# Patient Record
Sex: MALE | Race: WHITE | NOT HISPANIC OR LATINO | Employment: OTHER | URBAN - METROPOLITAN AREA
[De-identification: names, ages, dates, MRNs, and addresses within clinical notes are randomized per-mention and may not be internally consistent; named-entity substitution may affect disease eponyms.]

---

## 2020-04-20 RX ORDER — ROPINIROLE 1 MG/1
0.5 TABLET, FILM COATED ORAL 3 TIMES DAILY
COMMUNITY
End: 2020-08-25 | Stop reason: SDUPTHER

## 2020-04-20 RX ORDER — LISINOPRIL 10 MG/1
10 TABLET ORAL DAILY
COMMUNITY
End: 2020-08-25 | Stop reason: SDUPTHER

## 2020-04-20 RX ORDER — METOPROLOL TARTRATE 50 MG/1
50 TABLET, FILM COATED ORAL EVERY 12 HOURS SCHEDULED
COMMUNITY
End: 2021-02-11 | Stop reason: SDUPTHER

## 2020-04-20 RX ORDER — OMEPRAZOLE 20 MG/1
20 CAPSULE, DELAYED RELEASE ORAL DAILY
COMMUNITY
End: 2020-04-21 | Stop reason: CLARIF

## 2020-04-20 RX ORDER — ASPIRIN 81 MG/1
81 TABLET ORAL DAILY
COMMUNITY

## 2020-04-20 RX ORDER — TAMSULOSIN HYDROCHLORIDE 0.4 MG/1
0.4 CAPSULE ORAL
COMMUNITY
End: 2020-04-21 | Stop reason: SDUPTHER

## 2020-04-20 RX ORDER — SIMVASTATIN 20 MG
20 TABLET ORAL
COMMUNITY
End: 2020-06-23 | Stop reason: SDUPTHER

## 2020-04-21 ENCOUNTER — OFFICE VISIT (OUTPATIENT)
Dept: INTERNAL MEDICINE CLINIC | Facility: CLINIC | Age: 60
End: 2020-04-21
Payer: COMMERCIAL

## 2020-04-21 VITALS
HEIGHT: 69 IN | DIASTOLIC BLOOD PRESSURE: 80 MMHG | OXYGEN SATURATION: 97 % | BODY MASS INDEX: 40.58 KG/M2 | SYSTOLIC BLOOD PRESSURE: 120 MMHG | WEIGHT: 274 LBS | HEART RATE: 61 BPM

## 2020-04-21 DIAGNOSIS — I10 ESSENTIAL HYPERTENSION: ICD-10-CM

## 2020-04-21 DIAGNOSIS — K21.00 REFLUX ESOPHAGITIS: Primary | ICD-10-CM

## 2020-04-21 DIAGNOSIS — N18.2 CKD (CHRONIC KIDNEY DISEASE) STAGE 2, GFR 60-89 ML/MIN: ICD-10-CM

## 2020-04-21 DIAGNOSIS — F32.0 CURRENT MILD EPISODE OF MAJOR DEPRESSIVE DISORDER WITHOUT PRIOR EPISODE (HCC): ICD-10-CM

## 2020-04-21 DIAGNOSIS — N40.0 BPH WITHOUT OBSTRUCTION/LOWER URINARY TRACT SYMPTOMS: ICD-10-CM

## 2020-04-21 PROCEDURE — 3074F SYST BP LT 130 MM HG: CPT | Performed by: INTERNAL MEDICINE

## 2020-04-21 PROCEDURE — 3008F BODY MASS INDEX DOCD: CPT | Performed by: INTERNAL MEDICINE

## 2020-04-21 PROCEDURE — 1036F TOBACCO NON-USER: CPT | Performed by: INTERNAL MEDICINE

## 2020-04-21 PROCEDURE — 3079F DIAST BP 80-89 MM HG: CPT | Performed by: INTERNAL MEDICINE

## 2020-04-21 PROCEDURE — 99214 OFFICE O/P EST MOD 30 MIN: CPT | Performed by: INTERNAL MEDICINE

## 2020-04-21 RX ORDER — ESCITALOPRAM OXALATE 10 MG/1
10 TABLET ORAL DAILY
Qty: 90 TABLET | Refills: 0 | Status: SHIPPED | OUTPATIENT
Start: 2020-04-21 | End: 2020-07-14

## 2020-04-21 RX ORDER — TAMSULOSIN HYDROCHLORIDE 0.4 MG/1
0.4 CAPSULE ORAL
Qty: 90 CAPSULE | Refills: 1 | Status: SHIPPED | OUTPATIENT
Start: 2020-04-21 | End: 2021-02-10 | Stop reason: SDUPTHER

## 2020-04-21 RX ORDER — PANTOPRAZOLE SODIUM 40 MG/1
40 TABLET, DELAYED RELEASE ORAL DAILY
Qty: 90 TABLET | Refills: 0 | Status: SHIPPED | OUTPATIENT
Start: 2020-04-21 | End: 2020-08-10

## 2020-06-23 DIAGNOSIS — E78.2 MIXED HYPERLIPIDEMIA: Primary | ICD-10-CM

## 2020-06-23 RX ORDER — SIMVASTATIN 20 MG
20 TABLET ORAL
Qty: 90 TABLET | Refills: 0 | Status: SHIPPED | OUTPATIENT
Start: 2020-06-23 | End: 2020-08-10

## 2020-07-12 DIAGNOSIS — F32.0 CURRENT MILD EPISODE OF MAJOR DEPRESSIVE DISORDER WITHOUT PRIOR EPISODE (HCC): ICD-10-CM

## 2020-07-14 RX ORDER — ESCITALOPRAM OXALATE 10 MG/1
TABLET ORAL
Qty: 90 TABLET | Refills: 0 | Status: SHIPPED | OUTPATIENT
Start: 2020-07-14 | End: 2021-03-12

## 2020-08-08 DIAGNOSIS — E78.2 MIXED HYPERLIPIDEMIA: ICD-10-CM

## 2020-08-08 DIAGNOSIS — K21.00 REFLUX ESOPHAGITIS: ICD-10-CM

## 2020-08-10 RX ORDER — SIMVASTATIN 20 MG
TABLET ORAL
Qty: 90 TABLET | Refills: 0 | Status: SHIPPED | OUTPATIENT
Start: 2020-08-10 | End: 2020-08-25 | Stop reason: SDUPTHER

## 2020-08-10 RX ORDER — PANTOPRAZOLE SODIUM 40 MG/1
TABLET, DELAYED RELEASE ORAL
Qty: 90 TABLET | Refills: 0 | Status: SHIPPED | OUTPATIENT
Start: 2020-08-10 | End: 2020-08-25 | Stop reason: SDUPTHER

## 2020-08-24 ENCOUNTER — TELEPHONE (OUTPATIENT)
Dept: OTHER | Facility: OTHER | Age: 60
End: 2020-08-24

## 2020-08-25 ENCOUNTER — OFFICE VISIT (OUTPATIENT)
Dept: FAMILY MEDICINE CLINIC | Facility: CLINIC | Age: 60
End: 2020-08-25
Payer: COMMERCIAL

## 2020-08-25 VITALS
SYSTOLIC BLOOD PRESSURE: 122 MMHG | BODY MASS INDEX: 36.73 KG/M2 | HEIGHT: 69 IN | WEIGHT: 248 LBS | HEART RATE: 80 BPM | TEMPERATURE: 98.9 F | OXYGEN SATURATION: 98 % | RESPIRATION RATE: 16 BRPM | DIASTOLIC BLOOD PRESSURE: 78 MMHG

## 2020-08-25 DIAGNOSIS — G25.81 RLS (RESTLESS LEGS SYNDROME): ICD-10-CM

## 2020-08-25 DIAGNOSIS — H60.501 ACUTE OTITIS EXTERNA OF RIGHT EAR, UNSPECIFIED TYPE: ICD-10-CM

## 2020-08-25 DIAGNOSIS — I10 ESSENTIAL HYPERTENSION: ICD-10-CM

## 2020-08-25 DIAGNOSIS — K21.9 GASTROESOPHAGEAL REFLUX DISEASE WITHOUT ESOPHAGITIS: ICD-10-CM

## 2020-08-25 DIAGNOSIS — E78.2 MIXED HYPERLIPIDEMIA: Primary | ICD-10-CM

## 2020-08-25 PROCEDURE — 99214 OFFICE O/P EST MOD 30 MIN: CPT | Performed by: INTERNAL MEDICINE

## 2020-08-25 PROCEDURE — 3078F DIAST BP <80 MM HG: CPT | Performed by: INTERNAL MEDICINE

## 2020-08-25 PROCEDURE — 3074F SYST BP LT 130 MM HG: CPT | Performed by: INTERNAL MEDICINE

## 2020-08-25 PROCEDURE — 3008F BODY MASS INDEX DOCD: CPT | Performed by: INTERNAL MEDICINE

## 2020-08-25 PROCEDURE — 1036F TOBACCO NON-USER: CPT | Performed by: INTERNAL MEDICINE

## 2020-08-25 RX ORDER — ROPINIROLE 1 MG/1
1 TABLET, FILM COATED ORAL
Qty: 90 TABLET | Refills: 1 | Status: SHIPPED | OUTPATIENT
Start: 2020-08-25 | End: 2021-02-11 | Stop reason: SDUPTHER

## 2020-08-25 RX ORDER — OMEPRAZOLE 20 MG/1
20 CAPSULE, DELAYED RELEASE ORAL
Qty: 30 CAPSULE | Refills: 5 | Status: SHIPPED | OUTPATIENT
Start: 2020-08-25 | End: 2020-11-17 | Stop reason: SDUPTHER

## 2020-08-25 RX ORDER — NEOMYCIN SULFATE, POLYMYXIN B SULFATE AND HYDROCORTISONE 10; 3.5; 1 MG/ML; MG/ML; [USP'U]/ML
4 SUSPENSION/ DROPS AURICULAR (OTIC) 4 TIMES DAILY
Qty: 5 ML | Refills: 0 | Status: SHIPPED | OUTPATIENT
Start: 2020-08-25 | End: 2021-06-01

## 2020-08-25 RX ORDER — LISINOPRIL 20 MG/1
20 TABLET ORAL DAILY
Qty: 90 TABLET | Refills: 1 | Status: SHIPPED | OUTPATIENT
Start: 2020-08-25 | End: 2021-02-11 | Stop reason: SDUPTHER

## 2020-08-25 RX ORDER — SIMVASTATIN 40 MG
40 TABLET ORAL
Qty: 90 TABLET | Refills: 3 | Status: SHIPPED | OUTPATIENT
Start: 2020-08-25 | End: 2021-08-09

## 2020-08-25 NOTE — TELEPHONE ENCOUNTER
Pt wife called in to confirm appointment  For Tuesday at 10:00 am with [facility] requesting to leave a note

## 2020-08-25 NOTE — PROGRESS NOTES
Assessment/Plan:   1  Most likely has external otitis right ear  He was prescribed Cortisporin otic suspension  To be use for the next 5 days and see how he does  If he is not better of an ENT consult  2  Hypertension under control  3  Hyperlipidemia continue statin  Changed to 40 mg daily was taking alternating 20 and 30  Mg  4  Paroxysmal atrial fibrillation  He appears to be in sinus rhythm has had no recent bouts of atrial fibrillation and he is off anticoagulation  He was told should he have any recurrence to let us know as he has to go on anticoagulations right away discussed stroke    Problem List Items Addressed This Visit        Cardiovascular and Mediastinum    Essential hypertension    Relevant Medications    lisinopril (ZESTRIL) 20 mg tablet      Other Visit Diagnoses     Mixed hyperlipidemia    -  Primary    Relevant Medications    simvastatin (ZOCOR) 40 mg tablet    RLS (restless legs syndrome)        Relevant Medications    omeprazole (PriLOSEC) 20 mg delayed release capsule    rOPINIRole (REQUIP) 1 mg tablet    Gastroesophageal reflux disease without esophagitis        Relevant Medications    omeprazole (PriLOSEC) 20 mg delayed release capsule    Acute otitis externa of right ear, unspecified type        Relevant Medications    neomycin-polymyxin-hydrocortisone (CORTISPORIN) 0 35%-10,000 units/mL-1% otic suspension            Subjective:      Patient ID: Lorry Aschoff is a 63 y o  male  Ellsworth Dry is here for follow-up  He has history of hypertension and hyperlipidemia  He also had bout of proximal atrial fibrillation was on short-term anticoagulation  He said he had some kind of catheterization shock and ablation done  He was shocked several times  He was on anticoagulation but after ablation anticoagulation was discontinued  He has not had any problem with atrial fibrillation since      He has no headaches or dizziness, no palpitations, no chest pains or shortness of breath, no GI or  issues, no joint pains bone pains muscle aches, weight is stable appetite is good  He complains of right earache, clogged feeding, decrease hearing  This is present for several days  He tried to clean the wax with a Q-tip but it was not successful      The following portions of the patient's history were reviewed and updated as appropriate:   He has a past medical history of A-fib ([address] ), Arthritis, Cardiac arrhythmia, GERD (gastroesophageal reflux disease), Hyperlipidemia, Hypertension, Metabolic syndrome, Nocturia, Renal disorder, Restless legs, Seasonal allergies, Sleep apnea, and Vertigo  ,  does not have any pertinent problems on file  ,   has a past surgical history that includes Palate / uvula biopsy / excision; Shoulder surgery (Bilateral); Colonoscopy (01/01/2012); and Shoulder surgery (Bilateral)  ,  family history includes Cancer in his brother and mother; Diabetes in his sister; Heart disease in his brother and father  ,   reports that he has never smoked  He has never used smokeless tobacco  He reports current alcohol use  No history on file for drug ,  has No Known Allergies     Current Outpatient Medications   Medication Sig Dispense Refill    aspirin (ECOTRIN LOW STRENGTH) 81 mg EC tablet Take 81 mg by mouth daily      escitalopram (LEXAPRO) 10 mg tablet TAKE 1 TABLET BY MOUTH EVERY DAY 90 tablet 0    lisinopril (ZESTRIL) 20 mg tablet Take 1 tablet (20 mg total) by mouth daily 90 tablet 1    metoprolol tartrate (LOPRESSOR) 50 mg tablet Take 50 mg by mouth every 12 (twelve) hours      neomycin-polymyxin-hydrocortisone (CORTISPORIN) 0 35%-10,000 units/mL-1% otic suspension Administer 4 drops to the right ear 4 (four) times a day 5 mL 0    omeprazole (PriLOSEC) 20 mg delayed release capsule Take 1 capsule (20 mg total) by mouth daily before breakfast 30 capsule 5    rOPINIRole (REQUIP) 1 mg tablet Take 1 tablet (1 mg total) by mouth daily at bedtime 90 tablet 1    simvastatin (ZOCOR) 40 mg tablet Take 1 tablet (40 mg total) by mouth daily at bedtime 90 tablet 3    tamsulosin (FLOMAX) 0 4 mg Take 1 capsule (0 4 mg total) by mouth daily with dinner 90 capsule 1     No current facility-administered medications for this visit  Review of Systems   HENT: Positive for hearing loss  All other systems reviewed and are negative  Objective:  Vitals:    08/25/20 1030   BP: 122/78   BP Location: Left arm   Patient Position: Sitting   Cuff Size: Adult   Pulse: 80   Resp: 16   Temp: 98 9 °F (37 2 °C)   TempSrc: Tympanic   SpO2: 98%   Weight: 112 kg (248 lb)   Height: 5' 9" (1 753 m)     Body mass index is 36 62 kg/m²  Physical Exam  Vitals signs and nursing note reviewed  Constitutional:       Appearance: Normal appearance  HENT:      Head: Normocephalic and atraumatic  Right Ear: There is impacted cerumen  Left Ear: Tympanic membrane, ear canal and external ear normal       Ears:      Comments: Her right ear external canal is slightly inflamed and red  Tympanic membrane could not be seen     Nose: Nose normal       Mouth/Throat:      Mouth: Mucous membranes are moist    Eyes:      General: No scleral icterus  Extraocular Movements: Extraocular movements intact  Conjunctiva/sclera: Conjunctivae normal       Pupils: Pupils are equal, round, and reactive to light  Neck:      Musculoskeletal: Normal range of motion and neck supple  Vascular: No carotid bruit  Cardiovascular:      Rate and Rhythm: Regular rhythm  Pulses: Normal pulses  Heart sounds: Normal heart sounds  No murmur  Abdominal:      General: Abdomen is flat  Palpations: Abdomen is soft  There is no mass  Hernia: No hernia is present  Musculoskeletal: Normal range of motion  Right lower leg: No edema  Left lower leg: No edema  Lymphadenopathy:      Cervical: No cervical adenopathy  Skin:     General: Skin is warm and dry     Neurological:      General: No focal deficit present  Mental Status: He is alert and oriented to person, place, and time  Psychiatric:         Mood and Affect: Mood normal          Behavior: Behavior normal          Thought Content:  Thought content normal          Judgment: Judgment normal

## 2020-11-17 DIAGNOSIS — G25.81 RLS (RESTLESS LEGS SYNDROME): ICD-10-CM

## 2020-11-17 RX ORDER — OMEPRAZOLE 20 MG/1
20 CAPSULE, DELAYED RELEASE ORAL
Qty: 30 CAPSULE | Refills: 5 | Status: SHIPPED | OUTPATIENT
Start: 2020-11-17 | End: 2020-11-23

## 2020-11-20 DIAGNOSIS — G25.81 RLS (RESTLESS LEGS SYNDROME): ICD-10-CM

## 2020-11-23 RX ORDER — OMEPRAZOLE 20 MG/1
20 CAPSULE, DELAYED RELEASE ORAL
Qty: 30 CAPSULE | Refills: 5 | Status: SHIPPED | OUTPATIENT
Start: 2020-11-23

## 2021-02-10 DIAGNOSIS — N40.0 BPH WITHOUT OBSTRUCTION/LOWER URINARY TRACT SYMPTOMS: ICD-10-CM

## 2021-02-10 RX ORDER — TAMSULOSIN HYDROCHLORIDE 0.4 MG/1
0.4 CAPSULE ORAL
Qty: 90 CAPSULE | Refills: 1 | Status: SHIPPED | OUTPATIENT
Start: 2021-02-10 | End: 2021-08-02

## 2021-02-11 ENCOUNTER — OFFICE VISIT (OUTPATIENT)
Dept: FAMILY MEDICINE CLINIC | Facility: CLINIC | Age: 61
End: 2021-02-11
Payer: COMMERCIAL

## 2021-02-11 VITALS
WEIGHT: 284 LBS | BODY MASS INDEX: 42.06 KG/M2 | OXYGEN SATURATION: 98 % | TEMPERATURE: 97.6 F | HEIGHT: 69 IN | RESPIRATION RATE: 16 BRPM | DIASTOLIC BLOOD PRESSURE: 70 MMHG | SYSTOLIC BLOOD PRESSURE: 130 MMHG | HEART RATE: 78 BPM

## 2021-02-11 DIAGNOSIS — Z12.5 SCREENING FOR PROSTATE CANCER: ICD-10-CM

## 2021-02-11 DIAGNOSIS — I10 ESSENTIAL HYPERTENSION: ICD-10-CM

## 2021-02-11 DIAGNOSIS — N18.31 STAGE 3A CHRONIC KIDNEY DISEASE (HCC): ICD-10-CM

## 2021-02-11 DIAGNOSIS — M25.561 RIGHT KNEE PAIN, UNSPECIFIED CHRONICITY: ICD-10-CM

## 2021-02-11 DIAGNOSIS — N18.2 CKD (CHRONIC KIDNEY DISEASE) STAGE 2, GFR 60-89 ML/MIN: ICD-10-CM

## 2021-02-11 DIAGNOSIS — Z00.00 ENCOUNTER FOR PREVENTATIVE ADULT HEALTH CARE EXAMINATION: Primary | ICD-10-CM

## 2021-02-11 DIAGNOSIS — H53.9 VISION CHANGES: ICD-10-CM

## 2021-02-11 DIAGNOSIS — Z00.00 ANNUAL PHYSICAL EXAM: ICD-10-CM

## 2021-02-11 DIAGNOSIS — Z12.11 SCREEN FOR COLON CANCER: ICD-10-CM

## 2021-02-11 DIAGNOSIS — G25.81 RLS (RESTLESS LEGS SYNDROME): ICD-10-CM

## 2021-02-11 DIAGNOSIS — E66.01 MORBID OBESITY WITH BMI OF 40.0-44.9, ADULT (HCC): ICD-10-CM

## 2021-02-11 DIAGNOSIS — F32.0 CURRENT MILD EPISODE OF MAJOR DEPRESSIVE DISORDER WITHOUT PRIOR EPISODE (HCC): ICD-10-CM

## 2021-02-11 PROCEDURE — 3725F SCREEN DEPRESSION PERFORMED: CPT | Performed by: INTERNAL MEDICINE

## 2021-02-11 PROCEDURE — 99396 PREV VISIT EST AGE 40-64: CPT | Performed by: INTERNAL MEDICINE

## 2021-02-11 RX ORDER — METOPROLOL TARTRATE 50 MG/1
50 TABLET, FILM COATED ORAL EVERY 12 HOURS SCHEDULED
Qty: 180 TABLET | Refills: 1 | Status: SHIPPED | OUTPATIENT
Start: 2021-02-11 | End: 2021-08-06

## 2021-02-11 RX ORDER — ROPINIROLE 1 MG/1
1 TABLET, FILM COATED ORAL
Qty: 90 TABLET | Refills: 1 | Status: SHIPPED | OUTPATIENT
Start: 2021-02-11 | End: 2021-08-26 | Stop reason: SDUPTHER

## 2021-02-11 RX ORDER — LISINOPRIL 20 MG/1
20 TABLET ORAL DAILY
Qty: 90 TABLET | Refills: 1 | Status: SHIPPED | OUTPATIENT
Start: 2021-02-11 | End: 2021-08-06

## 2021-02-11 NOTE — PROGRESS NOTES
ADULT ANNUAL [address], [address] [facility]IMARY CARE [place]    NAME: Tobi Hoffmann  AGE: 63 y o  SEX: male  : 1960     DATE: 2021     Assessment and Plan:     Problem List Items Addressed This Visit        Cardiovascular and Mediastinum    Essential hypertension     Controlled with lisinopril  Check labs  Refer to ophthalmology         Relevant Medications    metoprolol tartrate (LOPRESSOR) 50 mg tablet    lisinopril (ZESTRIL) 20 mg tablet    Other Relevant Orders    CBC and differential    Comprehensive metabolic panel    Lipid panel    TSH, 3rd generation    Urinalysis with microscopic    Ambulatory referral to Ophthalmology       Other    Current mild episode of major depressive disorder without prior episode ([facility] )     stable         Encounter for preventative adult health care examination - Primary    Screening for prostate cancer    Relevant Orders    PSA, Total Screen    Morbid obesity with BMI of 40 0-44 9, adult ([facility] )      Other Visit Diagnoses     Annual physical exam        Vision changes        Relevant Orders    Ambulatory referral to Ophthalmology    Screen for colon cancer        Relevant Orders    Ambulatory referral for colonoscopy    RLS (restless legs syndrome)        Relevant Medications    rOPINIRole (REQUIP) 1 mg tablet          Immunizations and preventive care screenings were discussed with patient today  Appropriate education was printed on patient's after visit summary  Counseling:  · Alcohol/drug use: discussed moderation in alcohol intake, the recommendations for healthy alcohol use, and avoidance of illicit drug use  BMI Counseling: Body mass index is 41 94 kg/m²   The BMI is above normal  Nutrition recommendations include decreasing portion sizes, encouraging healthy choices of fruits and vegetables, decreasing fast food intake, moderation in carbohydrate intake, reducing intake of saturated and trans fat and reducing intake of cholesterol  Exercise recommendations include vigorous physical activity 75 minutes/week, exercising 3-5 times per week and strength training exercises  No pharmacotherapy was ordered  Return in about 3 months (around 2021)  Chief Complaint:     Chief Complaint   Patient presents with    Annual Exam      History of Present Illness:     Adult Annual Physical   Patient here for a comprehensive physical exam  The patient reports problems - schmitz  Diet and Physical Activity  · Diet/Nutrition: well balanced diet  · Exercise: no formal exercise  Depression Screening  PHQ-9 Depression Screening    PHQ-9:   Frequency of the following problems over the past two weeks:      Little interest or pleasure in doing things: 1 - several days  Feeling down, depressed, or hopeless: 1 - several days  Trouble falling or staying asleep, or sleeping too much: 2 - more than half the days  Feeling tired or having little energy: 2 - more than half the days  Poor appetite or overeatin - several days  Feeling bad about yourself - or that you are a failure or have let yourself or your family down: 0 - not at all  Trouble concentrating on things, such as reading the newspaper or watching television: 1 - several days  Moving or speaking so slowly that other people could have noticed  Or the opposite - being so fidgety or restless that you have been moving around a lot more than usual: 0 - not at all  Thoughts that you would be better off dead, or of hurting yourself in some way: 0 - not at all  PHQ-2 Score: 2  PHQ-9 Score: 8       General Health  · Sleep: sleeps well  · Hearing: normal - bilateral   · Vision: vision problems: lazy eye since kid  · Dental: no dental visits for >1 year   Health  · Symptoms include: none     Review of Systems:     Review of Systems   Constitutional: Negative for chills, fatigue and fever  HENT: Negative for congestion, nosebleeds and rhinorrhea      Eyes: Negative for discharge and visual disturbance  Respiratory: Negative for cough, chest tightness, shortness of breath and wheezing  Cardiovascular: Negative for chest pain  Gastrointestinal: Negative for abdominal distention, abdominal pain, constipation, diarrhea and vomiting  Endocrine: Negative for polydipsia and polyuria  Genitourinary: Negative for difficulty urinating, frequency and hematuria  Musculoskeletal: Positive for arthralgias (right knee)  Negative for back pain and myalgias  Skin: Negative for rash  Allergic/Immunologic: Negative for environmental allergies  Neurological: Negative for dizziness, syncope, weakness, light-headedness and headaches  Hematological: Negative  Psychiatric/Behavioral: Negative for agitation, confusion, decreased concentration, dysphoric mood, sleep disturbance and suicidal ideas  The patient is not nervous/anxious  All other systems reviewed and are negative  Past Medical History:     Past Medical History:   Diagnosis Date    [facility]     Arthritis     Cardiac arrhythmia     afib s/p ablasion     GERD (gastroesophageal reflux disease)     Hyperlipidemia     ldl 123 from 179 from 138, -  zocor     Hypertension     Metabolic syndrome     history of metabolic syndrome with insulin resistance  Diet and lifestyle modification, weight reduction, daily exercise  Patient education regarding metabolic syndrome, insulin resistant, dyslipidemia   Nocturia     Renal disorder     chronic kidney disease stage 2    Restless legs     Seasonal allergies     Sleep apnea     CPAP    Vertigo       Past Surgical History:     Past Surgical History:   Procedure Laterality Date    COLONOSCOPY  01/01/2012    no polyps    PALATE / UVULA BIOPSY / EXCISION      SHOULDER SURGERY Bilateral     SHOULDER SURGERY Bilateral     s/p 4 surgery - 2 on each side        Family History:     Family History   Problem Relation Age of Onset    Cancer Mother    Angelica Kaye Heart disease Father     Diabetes Sister     Heart disease Brother     Cancer Brother       Social History:        Social History     Socioeconomic History    Marital status: /Civil Union     Spouse name: None    Number of children: None    Years of education: None    Highest education level: None   Occupational History    None   Social Needs    Financial resource strain: Not very hard    Food insecurity     Worry: Never true     Inability: Never true    Transportation needs     Medical: No     Non-medical: No   Tobacco Use    Smoking status: Never Smoker    Smokeless tobacco: Never Used   Substance and Sexual Activity    Alcohol use: Yes     Comment: occasional    Drug use: Never    Sexual activity: None   Lifestyle    Physical activity     Days per week: 0 days     Minutes per session: 0 min    Stress: Not at all   Relationships    Social connections     Talks on phone: None     Gets together: None     Attends Muslim service: None     Active member of club or organization: None     Attends meetings of clubs or organizations: None     Relationship status: None    Intimate partner violence     Fear of current or ex partner: None     Emotionally abused: None     Physically abused: None     Forced sexual activity: None   Other Topics Concern    None   Social History Narrative    · Do you currently or have you served in [employer]:   No      · Alcohol intake:   Occasional      · General stress level:   Medium      · Occupation:    and truck drivers      · Caffeine intake:   Occasional       Current Medications:     Current Outpatient Medications   Medication Sig Dispense Refill    aspirin (ECOTRIN LOW STRENGTH) 81 mg EC tablet Take 81 mg by mouth daily      lisinopril (ZESTRIL) 20 mg tablet Take 1 tablet (20 mg total) by mouth daily 90 tablet 1    metoprolol tartrate (LOPRESSOR) 50 mg tablet Take 1 tablet (50 mg total) by mouth every 12 (twelve) hours 180 tablet 1    neomycin-polymyxin-hydrocortisone (CORTISPORIN) 0 35%-10,000 units/mL-1% otic suspension Administer 4 drops to the right ear 4 (four) times a day 5 mL 0    omeprazole (PriLOSEC) 20 mg delayed release capsule TAKE 1 CAPSULE (20 MG TOTAL) BY MOUTH DAILY BEFORE BREAKFAST 30 capsule 5    rOPINIRole (REQUIP) 1 mg tablet Take 1 tablet (1 mg total) by mouth daily at bedtime 90 tablet 1    simvastatin (ZOCOR) 40 mg tablet Take 1 tablet (40 mg total) by mouth daily at bedtime 90 tablet 3    tamsulosin (FLOMAX) 0 4 mg Take 1 capsule (0 4 mg total) by mouth daily with dinner 90 capsule 1    escitalopram (LEXAPRO) 10 mg tablet TAKE 1 TABLET BY MOUTH EVERY DAY (Patient not taking: Reported on 2/11/2021) 90 tablet 0     No current facility-administered medications for this visit  Allergies:     No Known Allergies   Physical Exam:     /70 (BP Location: Right arm, Patient Position: Sitting, Cuff Size: Large)   Pulse 78   Temp 97 6 °F (36 4 °C) (Temporal)   Resp 16   Ht 5' 9" (1 753 m)   Wt 129 kg (284 lb)   SpO2 98%   BMI 41 94 kg/m²     Physical Exam  Vitals signs and nursing note reviewed  Constitutional:       Appearance: He is well-developed  HENT:      Head: Normocephalic and atraumatic  Eyes:      Conjunctiva/sclera: Conjunctivae normal    Neck:      Musculoskeletal: Neck supple  Cardiovascular:      Rate and Rhythm: Normal rate and regular rhythm  Pulses: Normal pulses  Heart sounds: Normal heart sounds  No murmur  Pulmonary:      Effort: Pulmonary effort is normal  No respiratory distress  Breath sounds: Normal breath sounds  Abdominal:      Palpations: Abdomen is soft  Tenderness: There is no abdominal tenderness  Musculoskeletal:      Right lower leg: No edema  Left lower leg: No edema  Skin:     General: Skin is warm and dry  Neurological:      Mental Status: He is alert and oriented to person, place, and time     Psychiatric:         Mood and Affect: Mood normal          Behavior: Behavior normal           Eliza Rivera MD  [facility] [place]  BMI Counseling: Body mass index is 41 94 kg/m²  The BMI is above normal  Nutrition recommendations include reducing portion sizes

## 2021-02-11 NOTE — PATIENT INSTRUCTIONS
Wellness Visit for Adults   AMBULATORY CARE:   A wellness visit  is when you see your healthcare provider to get screened for health problems  Your healthcare provider will also give you advice on how to stay healthy  Write down your questions so you remember to ask them  Ask your healthcare provider how often you should have a wellness visit  What happens at a wellness visit:  Your healthcare provider will ask about your health, and your family history of health problems  This includes high blood pressure, heart disease, and cancer  He or she will ask if you have symptoms that concern you, if you smoke, and about your mood  You may also be asked about your intake of medicines, supplements, food, and alcohol  Any of the following may be done:  · Your weight  will be checked  Your height may also be checked so your body mass index (BMI) can be calculated  Your BMI shows if you are at a healthy weight  · Your blood pressure  and heart rate will be checked  Your temperature may also be checked  · Blood and urine tests  may be done  Blood tests may be done to check your cholesterol levels  Abnormal cholesterol levels increase your risk for heart disease and stroke  You may also need a blood or urine test to check for diabetes if you are at increased risk  Urine tests may be done to look for signs of an infection or kidney disease  · A physical exam  includes checking your heartbeat and lungs with a stethoscope  Your healthcare provider may also check your skin to look for sun damage  · Screening tests  may be recommended  A screening test is done to check for diseases that may not cause symptoms  The screening tests you may need depend on your age, gender, family history, and lifestyle habits  For example, colorectal screening may be recommended if you are 48years old or older  Screening tests you need if you are a woman:   · A Pap smear  is used to screen for cervical cancer   Pap smears are usually done every 3 to 5 years depending on your age  You may need them more often if you have had abnormal Pap smear test results in the past  Ask your healthcare provider how often you should have a Pap smear  · A mammogram  is an x-ray of your breasts to screen for breast cancer  Experts recommend mammograms every 2 years starting at age 48 years  You may need a mammogram at age 52 years or younger if you have an increased risk for breast cancer  Talk to your healthcare provider about when you should start having mammograms and how often you need them  Vaccines you may need:   · Get an influenza vaccine  every year  The influenza vaccine protects you from the flu  Several types of viruses cause the flu  The viruses change over time, so new vaccines are made each year  · Get a tetanus-diphtheria (Td) booster vaccine  every 10 years  This vaccine protects you against tetanus and diphtheria  Tetanus is a severe infection that may cause painful muscle spasms and lockjaw  Diphtheria is a severe bacterial infection that causes a thick covering in the back of your mouth and throat  · Get a human papillomavirus (HPV) vaccine  if you are female and aged 23 to 32 or male 23 to 24 and never received it  This vaccine protects you from HPV infection  HPV is the most common infection spread by sexual contact  HPV may also cause vaginal, penile, and anal cancers  · Get a pneumococcal vaccine  if you are aged 72 years or older  The pneumococcal vaccine is an injection given to protect you from pneumococcal disease  Pneumococcal disease is an infection caused by pneumococcal bacteria  The infection may cause pneumonia, meningitis, or an ear infection  · Get a shingles vaccine  if you are 60 or older, even if you have had shingles before  The shingles vaccine is an injection to protect you from the varicella-zoster virus  This is the same virus that causes chickenpox   Shingles is a painful rash that develops in people who had chickenpox or have been exposed to the virus  How to eat healthy:  My Plate is a model for planning healthy meals  It shows the types and amounts of foods that should go on your plate  Fruits and vegetables make up about half of your plate, and grains and protein make up the other half  A serving of dairy is included on the side of your plate  The amount of calories and serving sizes you need depends on your age, gender, weight, and height  Examples of healthy foods are listed below:  · Eat a variety of vegetables  such as dark green, red, and orange vegetables  You can also include canned vegetables low in sodium (salt) and frozen vegetables without added butter or sauces  · Eat a variety of fresh fruits , canned fruit in 100% juice, frozen fruit, and dried fruit  · Include whole grains  At least half of the grains you eat should be whole grains  Examples include whole-wheat bread, wheat pasta, brown rice, and whole-grain cereals such as oatmeal     · Eat a variety of protein foods such as seafood (fish and shellfish), lean meat, and poultry without skin (turkey and chicken)  Examples of lean meats include pork leg, shoulder, or tenderloin, and beef round, sirloin, tenderloin, and extra lean ground beef  Other protein foods include eggs and egg substitutes, beans, peas, soy products, nuts, and seeds  · Choose low-fat dairy products such as skim or 1% milk or low-fat yogurt, cheese, and cottage cheese  · Limit unhealthy fats  such as butter, hard margarine, and shortening  Exercise:  Exercise at least 30 minutes per day on most days of the week  Some examples of exercise include walking, biking, dancing, and swimming  You can also fit in more physical activity by taking the stairs instead of the elevator or parking farther away from stores  Include muscle strengthening activities 2 days each week  Regular exercise provides many health benefits   It helps you manage your weight, and decreases your risk for type 2 diabetes, heart disease, stroke, and high blood pressure  Exercise can also help improve your mood  Ask your healthcare provider about the best exercise plan for you  General health and safety guidelines:   · Do not smoke  Nicotine and other chemicals in cigarettes and cigars can cause lung damage  Ask your healthcare provider for information if you currently smoke and need help to quit  E-cigarettes or smokeless tobacco still contain nicotine  Talk to your healthcare provider before you use these products  · Limit alcohol  A drink of alcohol is 12 ounces of beer, 5 ounces of wine, or 1½ ounces of liquor  · Lose weight, if needed  Being overweight increases your risk of certain health conditions  These include heart disease, high blood pressure, type 2 diabetes, and certain types of cancer  · Protect your skin  Do not sunbathe or use tanning beds  Use sunscreen with a SPF 15 or higher  Apply sunscreen at least 15 minutes before you go outside  Reapply sunscreen every 2 hours  Wear protective clothing, hats, and sunglasses when you are outside  · Drive safely  Always wear your seatbelt  Make sure everyone in your car wears a seatbelt  A seatbelt can save your life if you are in an accident  Do not use your cell phone when you are driving  This could distract you and cause an accident  Pull over if you need to make a call or send a text message  · Practice safe sex  Use latex condoms if are sexually active and have more than one partner  Your healthcare provider may recommend screening tests for sexually transmitted infections (STIs)  · Wear helmets, lifejackets, and protective gear  Always wear a helmet when you ride a bike or motorcycle, go skiing, or play sports that could cause a head injury  Wear protective equipment when you play sports  Wear a lifejacket when you are on a boat or doing water sports      © Copyright [employer] 2020 Information is for End User's use only and may not be sold, redistributed or otherwise used for commercial purposes  All illustrations and images included in CareNotes® are the copyrighted property of A D A M [employer]  or Kaitlyn Araujo  The above information is an  only  It is not intended as medical advice for individual conditions or treatments  Talk to your doctor, nurse or pharmacist before following any medical regimen to see if it is safe and effective for you  Cholesterol and Your Health   AMBULATORY CARE:   Cholesterol  is a waxy, fat-like substance  Your body uses cholesterol to make hormones and new cells, and to protect nerves  Cholesterol is made by your body  It also comes from certain foods you eat, such as meat and dairy products  Your healthcare provider can help you set goals for your cholesterol levels  He or she can help you create a plan to meet your goals  Cholesterol level goals: Your cholesterol level goals depend on your risk for heart disease, your age, and your other health conditions  The following are general guidelines:  · Total cholesterol  includes low-density lipoprotein (LDL), high-density lipoprotein (HDL), and triglyceride levels  The total cholesterol level should be lower than 200 mg/dL and is best at about 150 mg/dL  · LDL cholesterol  is called bad cholesterol  because it forms plaque in your arteries  As plaque builds up, your arteries become narrow, and less blood flows through  When plaque decreases blood flow to your heart, you may have chest pain  If plaque completely blocks an artery that brings blood to your heart, you may have a heart attack  Plaque can break off and form blood clots  Blood clots may block arteries in your brain and cause a stroke  The level should be less than 130 mg/dL and is best at about 100 mg/dL  · HDL cholesterol  is called good cholesterol  because it helps remove LDL cholesterol from your arteries   It does this by attaching to LDL cholesterol and carrying it to your liver  Your liver breaks down LDL cholesterol so your body can get rid of it  High levels of HDL cholesterol can help prevent a heart attack and stroke  Low levels of HDL cholesterol can increase your risk for heart disease, heart attack, and stroke  The level should be 60 mg/dL or higher  · Triglycerides  are a type of fat that store energy from foods you eat  High levels of triglycerides also cause plaque buildup  This can increase your risk for a heart attack or stroke  If your triglyceride level is high, your LDL cholesterol level may also be high  The level should be less than 150 mg/dL  What increases your risk for high cholesterol:   · Smoking cigarettes    · Being overweight or obese, or not getting enough exercise    · Drinking large amounts of alcohol    · A medical condition such as hypertension (high blood pressure) or diabetes    · Certain genes passed from your parents to you    · Age older than 65 years    What you need to know about having your cholesterol levels checked: Adults 21to 39years of age should have their cholesterol levels checked every 4 to 6 years  Adults 45 years or older should have their cholesterol checked every 1 to 2 years  You may need your cholesterol checked more often, or at a younger age, if you have risk factors for heart disease  You may also need to have your cholesterol checked more often if you have other health conditions, such as diabetes  Blood tests are used to check cholesterol levels  Blood tests measure your levels of triglycerides, LDL cholesterol, and HDL cholesterol  How healthy fats affect your cholesterol levels:  Healthy fats, also called unsaturated fats, help lower LDL cholesterol and triglyceride levels  Healthy fats include the following:  · Monounsaturated fats  are found in foods such as olive oil, canola oil, avocado, nuts, and olives      · Polyunsaturated fats,  such as omega 3 fats, are found in fish, such as salmon, trout, and tuna  They can also be found in plant foods such as flaxseed, walnuts, and soybeans  How unhealthy fats affect your cholesterol levels:  Unhealthy fats increase LDL cholesterol and triglyceride levels  They are found in foods high in cholesterol, saturated fat, and trans fat:  · Cholesterol  is found in eggs, dairy, and meat  · Saturated fat  is found in butter, cheese, ice cream, whole milk, and coconut oil  Saturated fat is also found in meat, such as sausage, hot dogs, and bologna  · Trans fat  is found in liquid oils and is used in fried and baked foods  Foods that contain trans fats include chips, crackers, muffins, sweet rolls, microwave popcorn, and cookies  Treatment  for high cholesterol will also decrease your risk of heart disease, heart attack, and stroke  Treatment may include any of the following:  · Lifestyle changes  may include food, exercise, weight loss, and quitting smoking  You may also need to decrease the amount of alcohol you drink  Your healthcare provider will want you to start with lifestyle changes  Other treatment may be added if lifestyle changes are not enough  · Medicines  may be given to lower your LDL cholesterol, triglyceride levels, or total cholesterol level  You may need medicines to lower your cholesterol if any of the following is true:     ? You have a history of stroke, TIA, unstable angina, or a heart attack  ? Your LDL cholesterol level is 190 mg/dL or higher  ? You are age 36 to 76 years, have diabetes or heart disease risk factors, and your LDL cholesterol is 70 mg/dL or higher  · Supplements  include fish oil, red yeast rice, and garlic  Fish oil may help lower your triglyceride and LDL cholesterol levels  It may also increase your HDL cholesterol level  Red yeast rice may help decrease your total cholesterol level and LDL cholesterol level  Garlic may help lower your total cholesterol level   Do not take these supplements without talking to your healthcare provider  Food changes you can make to lower your cholesterol levels:  A dietitian can help you create a healthy eating plan  He or she can show you how to read food labels and choose foods low in saturated fat, trans fats, and cholesterol  · Decrease the total amount of fat you eat  Choose lean meats, fat-free or 1% fat milk, and low-fat dairy products, such as yogurt and cheese  Try to limit or avoid red meats  Limit or do not eat fried foods or baked goods, such as cookies  · Replace unhealthy fats with healthy fats  Cook foods in olive oil or canola oil  Choose soft margarines that are low in saturated fat and trans fat  Seeds, nuts, and avocados are other examples of healthy fats  · Eat foods with omega-3 fats  Examples include salmon, tuna, mackerel, walnuts, and flaxseed  Eat fish 2 times per week  Pregnant women should not eat fish that have high levels of mercury, such as shark, swordfish, and kumar mackerel  · Increase the amount of high-fiber foods you eat  High-fiber foods can help lower your LDL cholesterol  Aim to get between 20 and 30 grams of fiber each day  Fruits and vegetables are high in fiber  Eat at least 5 servings each day  Other high-fiber foods are whole-grain or whole-wheat breads, pastas, or cereals, and brown rice  Eat 3 ounces of whole-grain foods each day  Increase fiber slowly  You may have abdominal discomfort, bloating, and gas if you add fiber to your diet too quickly  · Eat healthy protein foods  Examples include low-fat dairy products, skinless chicken and turkey, fish, and nuts  · Limit foods and drinks that are high in sugar  Your dietitian or healthcare provider can help you create daily limits for high-sugar foods and drinks  The limit may be lower if you have diabetes or another health condition  Limits can also help you lose weight if needed    Lifestyle changes you can make to lower your cholesterol levels:   · Maintain a healthy weight  Ask your healthcare provider what a healthy weight is for you  Ask him or her to help you create a weight loss plan if needed  Weight loss can decrease your total cholesterol and triglyceride levels  Weight loss may also help keep your blood pressure at a healthy level  · Exercise regularly  Exercise can help lower your total cholesterol level and maintain a healthy weight  Exercise can also help increase your HDL cholesterol level  Work with your healthcare provider to create an exercise program that is right for you  Get at least 30 to 40 minutes of moderate exercise most days of the week  Examples of exercise include brisk walking, swimming, or biking  · Do not smoke  Nicotine and other chemicals in cigarettes and cigars can raise your cholesterol levels  Ask your healthcare provider for information if you currently smoke and need help to quit  E-cigarettes or smokeless tobacco still contain nicotine  Talk to your healthcare provider before you use these products  · Limit or do not drink alcohol  Alcohol can increase your triglyceride levels  Ask your healthcare provider before you drink alcohol  Ask how much is okay for you to drink in 1 day or 1 week  © Copyright [address] Information is for End User's use only and may not be sold, redistributed or otherwise used for commercial purposes  All illustrations and images included in CareNotes® are the copyrighted property of [employer] M , Inc  or [address] [place] [address]  The above information is an  only  It is not intended as medical advice for individual conditions or treatments  Talk to your doctor, nurse or pharmacist before following any medical regimen to see if it is safe and effective for you  Low Fat Diet   AMBULATORY CARE:   A low-fat diet  is an eating plan that is low in total fat, unhealthy fat, and cholesterol   You may need to follow a low-fat diet if you have trouble digesting or absorbing fat  You may also need to follow this diet if you have high cholesterol  You can also lower your cholesterol by increasing the amount of fiber in your diet  Soluble fiber is a type of fiber that helps to decrease cholesterol levels  Different types of fat in food:   · Limit unhealthy fats  A diet that is high in cholesterol, saturated fat, and trans fat may cause unhealthy cholesterol levels  Unhealthy cholesterol levels increase your risk of heart disease  ? Cholesterol:  Limit intake of cholesterol to less than 200 mg per day  Cholesterol is found in meat, eggs, and dairy  ? Saturated fat:  Limit saturated fat to less than 7% of your total daily calories  Ask your dietitian how many calories you need each day  Saturated fat is found in butter, cheese, ice cream, whole milk, and palm oil  Saturated fat is also found in meat, such as beef, pork, chicken skin, and processed meats  Processed meats include sausage, hot dogs, and bologna  ? Trans fat:  Avoid trans fat as much as possible  Trans fat is used in fried and baked foods  Foods that say trans fat free on the label may still have up to 0 5 grams of trans fat per serving  · Include healthy fats  Replace foods that are high in saturated and trans fat with foods high in healthy fats  This may help to decrease high cholesterol levels  ? Monounsaturated fats: These are found in avocados, nuts, and vegetable oils, such as olive, canola, and sunflower oil  ? Polyunsaturated fats: These can be found in vegetable oils, such as soybean or corn oil  Omega-3 fats can help to decrease the risk of heart disease  Omega-3 fats are found in fish, such as salmon, herring, trout, and tuna  Omega-3 fats can also be found in plant foods, such as walnuts, flaxseed, soybeans, and canola oil  Foods to limit or avoid:   · Grains:      ?  Snacks that are made with partially hydrogenated oils, such as chips, regular crackers, and butter-flavored popcorn    ? High-fat baked goods, such as biscuits, croissants, doughnuts, pies, cookies, and pastries    · Dairy:      ? Whole milk, 2% milk, and yogurt and ice cream made with whole milk    ? Half and half creamer, heavy cream, and whipping cream    ? Cheese, cream cheese, and sour cream    · Meats and proteins:      ? High-fat cuts of meat (T-bone steak, regular hamburger, and ribs)    ? Fried meat, poultry (turkey and chicken), and fish    ? Poultry (chicken and turkey) with skin    ? Cold cuts (salami or bologna), hot dogs, genao, and sausage    ? Whole eggs and egg yolks    · Vegetables and fruits with added fat:      ? Fried vegetables or vegetables in butter or high-fat sauces, such as cream or cheese sauces    ? Fried fruit or fruit served with butter or cream    · Fats:      ? Butter, stick margarine, and shortening    ? Coconut, palm oil, and palm kernel oil    Foods to include:   · Grains:      ? Whole-grain breads, cereals, pasta, and brown rice    ? Low-fat crackers and pretzels    · Vegetables and fruits:      ? Fresh, frozen, or canned vegetables (no salt or low-sodium)    ? Fresh, frozen, dried, or canned fruit (canned in light syrup or fruit juice)    ? Avocado    · Low-fat dairy products:      ? Nonfat (skim) or 1% milk    ? Nonfat or low-fat cheese, yogurt, and cottage cheese    · Meats and proteins:      ? Chicken or turkey with no skin    ? Baked or broiled fish    ? Lean beef and pork (loin, round, extra lean hamburger)    ? Beans and peas, unsalted nuts, soy products    ? Egg whites and substitutes    ? Seeds and nuts    · Fats:      ? Unsaturated oil, such as canola, olive, peanut, soybean, or sunflower oil    ? Soft or liquid margarine and vegetable oil spread    ? Low-fat salad dressing    Other ways to decrease fat:   · Read food labels before you buy foods  Choose foods that have less than 30% of calories from fat  Choose low-fat or fat-free dairy products   Remember that fat free does not mean calorie free  These foods still contain calories, and too many calories can lead to weight gain  · Trim fat from meat and avoid fried food  Trim all visible fat from meat before you cook it  Remove the skin from poultry  Do not escalona meat, fish, or poultry  Bake, roast, boil, or broil these foods instead  Avoid fried foods  Eat a baked potato instead of [place] fries  Steam vegetables instead of sautéing them in butter  · Add less fat to foods  Use imitation genao bits on salads and baked potatoes instead of regular genao bits  Use fat-free or low-fat salad dressings instead of regular dressings  Use low-fat or nonfat butter-flavored topping instead of regular butter or margarine on popcorn and other foods  Ways to decrease fat in recipes:  Replace high-fat ingredients with low-fat or nonfat ones  This may cause baked goods to be drier than usual  You may need to use nonfat cooking spray on pans to prevent food from sticking  You also may need to change the amount of other ingredients, such as water, in the recipe  Try the following:  · Use low-fat or light margarine instead of regular margarine or shortening  · Use lean ground turkey breast or chicken, or lean ground beef (less than 5% fat) instead of hamburger  · Add 1 teaspoon of canola oil to 8 ounces of skim milk instead of using cream or half and half  · Use grated zucchini, carrots, or apples in breads instead of coconut  · Use blenderized, low-fat cottage cheese, plain tofu, or low-fat ricotta cheese instead of cream cheese  · Use 1 egg white and 1 teaspoon of canola oil, or use ¼ cup (2 ounces) of fat-free egg substitute instead of a whole egg  · Replace half of the oil that is called for in a recipe with applesauce when you bake  Use 3 tablespoons of cocoa powder and 1 tablespoon of canola oil instead of a square of baking chocolate      How to increase fiber:  Eat enough high-fiber foods to get 20 to 30 grams of fiber every day  Slowly increase your fiber intake to avoid stomach cramps, gas, and other problems  · Eat 3 ounces of whole-grain foods each day  An ounce is about 1 slice of bread  Eat whole-grain breads, such as whole-wheat bread  Whole wheat, whole-wheat flour, or other whole grains should be listed as the first ingredient on the food label  Replace white flour with whole-grain flour or use half of each in recipes  Whole-grain flour is heavier than white flour, so you may have to add more yeast or baking powder  · Eat a high-fiber cereal for breakfast   Oatmeal is a good source of soluble fiber  Look for cereals that have bran or fiber in the name  Choose whole-grain products, such as brown rice, barley, and whole-wheat pasta  · Eat more beans, peas, and lentils  For example, add beans to soups or salads  Eat at least 5 cups of fruits and vegetables each day  Eat fruits and vegetables with the peel because the peel is high in fiber  © Copyright [address] Information is for End User's use only and may not be sold, redistributed or otherwise used for commercial purposes  All illustrations and images included in CareNotes® are the copyrighted property of A D A M , Inc  or [address] [place], [place] [address]jean claude   The above information is an  only  It is not intended as medical advice for individual conditions or treatments  Talk to your doctor, nurse or pharmacist before following any medical regimen to see if it is safe and effective for you  Calorie Counting Diet   WHAT YOU NEED TO KNOW:   What is a calorie counting diet? It is a meal plan based on counting calories each day to reach a healthy body weight  You will need to eat fewer calories if you are trying to lose weight  Weight loss may decrease your risk for certain health problems or improve your health if you have health problems  Some of these health problems include heart disease, high blood pressure, and diabetes  What foods should I avoid? Your dietitian will tell you if you need to avoid certain foods based on your body weight and health condition  You may need to avoid high-fat foods if you are at risk for or have heart disease  You may need to eat fewer foods from the breads and starches food group if you have diabetes  How many calories are in foods? The following is a list of foods and drinks with the approximate number of calories in each  Check the food label to find the exact number of calories  A dietitian can tell you how many calories you should have from each food group each day  · Carbohydrate:      ? ½ of a 3-inch bagel, 1 slice of bread, or ½ of a hamburger bun or hot dog bun (80)    ? 1 (8-inch) flour tortilla or ½ cup of cooked rice (100)    ? 1 (6-inch) corn tortilla (80)    ? 1 (6-inch) pancake or 1 cup of bran flakes cereal (110)    ? ½ cup of cooked cereal (80)    ? ½ cup of cooked pasta (85)    ? 1 ounce of pretzels (100)    ? 3 cups of air-popped popcorn without butter or oil (80)    · Dairy:      ? 1 cup of skim or 1% milk (90)    ? 1 cup of 2% milk (120)    ? 1 cup of whole milk (160)    ? 1 cup of 2% chocolate milk (220)    ? 1 ounce of low-fat cheese with 3 grams of fat per ounce (70)    ? 1 ounce of cheddar cheese (114)    ? ½ cup of 1% fat cottage cheese (80)    ? 1 cup of plain or sugar-free, fat-free yogurt (90)    · Protein foods:      ? 3 ounces of fish (not breaded or fried) (95)    ? 3 ounces of breaded, fried fish (195)    ? ¾ cup of tuna canned in water (105)    ? 3 ounces of chicken breast without skin (105)    ? 1 fried chicken breast with skin (350)    ? ¼ cup of fat free egg substitute (40)    ? 1 large egg (75)    ? 3 ounces of lean beef or pork (165)    ? 3 ounces of fried pork chop or ham (185)    ? ½ cup of cooked dried beans, such as kidney, richardson, lentils, or navy (115)    ? 3 ounces of bologna or lunch meat (225)    ?  2 links of breakfast sausage (140)    · Vegetables: ? ½ cup of sliced mushrooms (10)    ? 1 cup of salad greens, such as lettuce, spinach, or vaishnavi (15)    ? ½ cup of steamed asparagus (20)    ? ½ cup of cooked summer squash, zucchini squash, or green or wax beans (25)    ? 1 cup of broccoli or cauliflower florets, or 1 medium tomato (25)    ? 1 large raw carrot or ½ cup of cooked carrots (40)    ? ? of a medium cucumber or 1 stalk of celery (5)    ? 1 small baked potato (160)    ? 1 cup of breaded, fried vegetables (230)    · Fruit:      ? 1 (6-inch) banana (55)     ? ½ of a 4-inch grapefruit (55)    ? 15 grapes (60)    ? 1 medium orange or apple (70)    ? 1 large peach (65)    ? 1 cup of fresh pineapple chunks (75)    ? 1 cup of melon cubes (50)    ? 1¼ cups of whole strawberries (45)    ? ½ cup of fruit canned in juice (55)    ? ½ cup of fruit canned in heavy syrup (110)    ? ? cup of raisins (130)    ? ½ cup of unsweetened fruit juice (60)    ? ½ cup of grape, cranberry, or prune juice (90)    · Fat:      ? 10 peanuts or 2 teaspoons of peanut butter (55)    ? 2 tablespoons of avocado or 1 tablespoon of regular salad dressing (45)    ? 2 slices of genao (90)    ? 1 teaspoon of oil, such as safflower, canola, corn, or olive oil (45)    ? 2 teaspoons of low-fat margarine, or 1 tablespoon of low-fat mayonnaise (50)    ? 1 teaspoon of regular margarine (40)    ? 1 tablespoon of regular mayonnaise (135)    ? 1 tablespoon of cream cheese or 2 tablespoons of low-fat cream cheese (45)    ? 2 tablespoons of vegetable shortening (215)    · Dessert and sweets:      ? 8 animal crackers or 5 vanilla wafers (80)    ? 1 frozen fruit juice bar (80)    ? ½ cup of ice milk or low-fat frozen yogurt (90)    ? ½ cup of sherbet or sorbet (125)    ? ½ cup of sugar-free pudding or custard (60)    ? ½ cup of ice cream (140)    ?  ½ cup of pudding or custard (175)    ? 1 (2-inch) square chocolate brownie (185)    · Combination foods:      ? Bean burrito made with an 8-inch tortilla, without cheese (275)    ? Chicken breast sandwich with lettuce and tomato (325)    ? 1 cup of chicken noodle soup (60)    ? 1 beef taco (175)    ? Regular hamburger with lettuce and tomato (310)    ? Regular cheeseburger with lettuce and tomato (410)     ? ¼ of a 12-inch cheese pizza (280)    ? Fried fish sandwich with lettuce and tomato (425)    ? Hot dog and bun (275)    ? 1½ cups of macaroni and cheese (310)    ? Taco salad with a fried tortilla shell (870)    · Low-calorie foods:      ? 1 tablespoon of ketchup or 1 tablespoon of fat free sour cream (15)    ? 1 teaspoon of mustard (5)    ? ¼ cup of salsa (20)    ? 1 large dill pickle (15)    ? 1 tablespoon of fat free salad dressing (10)    ? 2 teaspoons of low-sugar, light jam or jelly, or 1 tablespoon of sugar-free syrup (15)    ? 1 sugar-free popsicle (15)    ? 1 cup of club soda, seltzer water, or diet soda (0)    CARE AGREEMENT:   You have the right to help plan your care  Discuss treatment options with your healthcare provider to decide what care you want to receive  You always have the right to refuse treatment  The above information is an  only  It is not intended as medical advice for individual conditions or treatments  Talk to your doctor, nurse or pharmacist before following any medical regimen to see if it is safe and effective for you  © Copyright [address] [facility] Information is for End User's use only and may not be sold, redistributed or otherwise used for commercial purposes   All illustrations and images included in CareNotes® are the copyrighted property of [employer]  or [facility] [employer]

## 2021-02-12 ENCOUNTER — APPOINTMENT (OUTPATIENT)
Dept: LAB | Facility: MEDICAL CENTER | Age: 61
End: 2021-02-12
Payer: COMMERCIAL

## 2021-02-12 DIAGNOSIS — I10 ESSENTIAL HYPERTENSION: ICD-10-CM

## 2021-02-12 DIAGNOSIS — Z12.5 SCREENING FOR PROSTATE CANCER: ICD-10-CM

## 2021-02-12 LAB
ALBUMIN SERPL BCP-MCNC: 3.3 G/DL (ref 3.5–5)
ALP SERPL-CCNC: 74 U/L (ref 46–116)
ALT SERPL W P-5'-P-CCNC: 37 U/L (ref 12–78)
ANION GAP SERPL CALCULATED.3IONS-SCNC: 5 MMOL/L (ref 4–13)
AST SERPL W P-5'-P-CCNC: 23 U/L (ref 5–45)
BACTERIA UR QL AUTO: ABNORMAL /HPF
BASOPHILS # BLD AUTO: 0.07 THOUSANDS/ΜL (ref 0–0.1)
BASOPHILS NFR BLD AUTO: 1 % (ref 0–1)
BILIRUB SERPL-MCNC: 0.53 MG/DL (ref 0.2–1)
BILIRUB UR QL STRIP: ABNORMAL
BUN SERPL-MCNC: 23 MG/DL (ref 5–25)
CALCIUM ALBUM COR SERPL-MCNC: 10.4 MG/DL (ref 8.3–10.1)
CALCIUM SERPL-MCNC: 9.8 MG/DL (ref 8.3–10.1)
CAOX CRY URNS QL MICRO: ABNORMAL /HPF
CHLORIDE SERPL-SCNC: 106 MMOL/L (ref 100–108)
CHOLEST SERPL-MCNC: 176 MG/DL (ref 50–200)
CLARITY UR: CLEAR
CO2 SERPL-SCNC: 29 MMOL/L (ref 21–32)
COLOR UR: ABNORMAL
CREAT SERPL-MCNC: 1.52 MG/DL (ref 0.6–1.3)
EOSINOPHIL # BLD AUTO: 0.16 THOUSAND/ΜL (ref 0–0.61)
EOSINOPHIL NFR BLD AUTO: 2 % (ref 0–6)
ERYTHROCYTE [DISTWIDTH] IN BLOOD BY AUTOMATED COUNT: 15.2 % (ref 11.6–15.1)
GFR SERPL CREATININE-BSD FRML MDRD: 49 ML/MIN/1.73SQ M
GLUCOSE P FAST SERPL-MCNC: 107 MG/DL (ref 65–99)
GLUCOSE UR STRIP-MCNC: NEGATIVE MG/DL
HCT VFR BLD AUTO: 52.6 % (ref 36.5–49.3)
HDLC SERPL-MCNC: 40 MG/DL
HGB BLD-MCNC: 16.2 G/DL (ref 12–17)
HGB UR QL STRIP.AUTO: NEGATIVE
IMM GRANULOCYTES # BLD AUTO: 0.02 THOUSAND/UL (ref 0–0.2)
IMM GRANULOCYTES NFR BLD AUTO: 0 % (ref 0–2)
KETONES UR STRIP-MCNC: NEGATIVE MG/DL
LDLC SERPL CALC-MCNC: 93 MG/DL (ref 0–100)
LEUKOCYTE ESTERASE UR QL STRIP: NEGATIVE
LYMPHOCYTES # BLD AUTO: 2.64 THOUSANDS/ΜL (ref 0.6–4.47)
LYMPHOCYTES NFR BLD AUTO: 37 % (ref 14–44)
MCH RBC QN AUTO: 28.1 PG (ref 26.8–34.3)
MCHC RBC AUTO-ENTMCNC: 30.8 G/DL (ref 31.4–37.4)
MCV RBC AUTO: 91 FL (ref 82–98)
MONOCYTES # BLD AUTO: 0.58 THOUSAND/ΜL (ref 0.17–1.22)
MONOCYTES NFR BLD AUTO: 8 % (ref 4–12)
NEUTROPHILS # BLD AUTO: 3.61 THOUSANDS/ΜL (ref 1.85–7.62)
NEUTS SEG NFR BLD AUTO: 52 % (ref 43–75)
NITRITE UR QL STRIP: NEGATIVE
NON-SQ EPI CELLS URNS QL MICRO: ABNORMAL /HPF
NONHDLC SERPL-MCNC: 136 MG/DL
NRBC BLD AUTO-RTO: 0 /100 WBCS
PH UR STRIP.AUTO: 6 [PH]
PLATELET # BLD AUTO: 254 THOUSANDS/UL (ref 149–390)
PMV BLD AUTO: 9.2 FL (ref 8.9–12.7)
POTASSIUM SERPL-SCNC: 4 MMOL/L (ref 3.5–5.3)
PROT SERPL-MCNC: 8.4 G/DL (ref 6.4–8.2)
PROT UR STRIP-MCNC: ABNORMAL MG/DL
PSA SERPL-MCNC: 1.2 NG/ML (ref 0–4)
RBC # BLD AUTO: 5.77 MILLION/UL (ref 3.88–5.62)
RBC #/AREA URNS AUTO: ABNORMAL /HPF
SODIUM SERPL-SCNC: 140 MMOL/L (ref 136–145)
SP GR UR STRIP.AUTO: 1.03 (ref 1–1.03)
TRIGL SERPL-MCNC: 213 MG/DL
TSH SERPL DL<=0.05 MIU/L-ACNC: 1.39 UIU/ML (ref 0.36–3.74)
UROBILINOGEN UR QL STRIP.AUTO: 1 E.U./DL
WBC # BLD AUTO: 7.08 THOUSAND/UL (ref 4.31–10.16)
WBC #/AREA URNS AUTO: ABNORMAL /HPF

## 2021-02-12 PROCEDURE — 81001 URINALYSIS AUTO W/SCOPE: CPT | Performed by: INTERNAL MEDICINE

## 2021-02-12 PROCEDURE — 80061 LIPID PANEL: CPT

## 2021-02-12 PROCEDURE — 85025 COMPLETE CBC W/AUTO DIFF WBC: CPT

## 2021-02-12 PROCEDURE — G0103 PSA SCREENING: HCPCS

## 2021-02-12 PROCEDURE — 36415 COLL VENOUS BLD VENIPUNCTURE: CPT

## 2021-02-12 PROCEDURE — 80053 COMPREHEN METABOLIC PANEL: CPT

## 2021-02-12 PROCEDURE — 84443 ASSAY THYROID STIM HORMONE: CPT

## 2021-02-16 ENCOUNTER — TELEPHONE (OUTPATIENT)
Dept: GASTROENTEROLOGY | Facility: CLINIC | Age: 61
End: 2021-02-16

## 2021-02-16 NOTE — TELEPHONE ENCOUNTER
Received order from Dr Durga Long for pt to be scheduled for colon screening, however, pt is a new pt to us  I lmom for pt to please call back to schedule an appt per Dr Durga Long  Will call pt again in one week if do not hear back from from her

## 2021-02-23 NOTE — TELEPHONE ENCOUNTER
I lmom for pt to please call back to schedule an appt per Dr Blas Whiting  Will call pt again in two weeks if do not hear back from her

## 2021-03-09 NOTE — TELEPHONE ENCOUNTER
I lmom for pt to please call back to schedule an appt per Dr Tim Martino  Will wait for pt's call back at this time

## 2021-03-10 DIAGNOSIS — Z23 ENCOUNTER FOR IMMUNIZATION: ICD-10-CM

## 2021-03-12 ENCOUNTER — APPOINTMENT (OUTPATIENT)
Dept: RADIOLOGY | Facility: AMBULARY SURGERY CENTER | Age: 61
End: 2021-03-12
Attending: ORTHOPAEDIC SURGERY
Payer: COMMERCIAL

## 2021-03-12 ENCOUNTER — OFFICE VISIT (OUTPATIENT)
Dept: OBGYN CLINIC | Facility: CLINIC | Age: 61
End: 2021-03-12
Payer: COMMERCIAL

## 2021-03-12 VITALS
SYSTOLIC BLOOD PRESSURE: 128 MMHG | DIASTOLIC BLOOD PRESSURE: 80 MMHG | HEART RATE: 82 BPM | WEIGHT: 285 LBS | BODY MASS INDEX: 42.21 KG/M2 | HEIGHT: 69 IN

## 2021-03-12 DIAGNOSIS — M25.561 RIGHT KNEE PAIN, UNSPECIFIED CHRONICITY: ICD-10-CM

## 2021-03-12 DIAGNOSIS — M17.11 PRIMARY OSTEOARTHRITIS OF RIGHT KNEE: Primary | ICD-10-CM

## 2021-03-12 DIAGNOSIS — M17.12 PRIMARY OSTEOARTHRITIS OF LEFT KNEE: ICD-10-CM

## 2021-03-12 DIAGNOSIS — M25.562 LEFT KNEE PAIN, UNSPECIFIED CHRONICITY: ICD-10-CM

## 2021-03-12 PROBLEM — M17.0 PRIMARY OSTEOARTHRITIS OF BOTH KNEES: Status: ACTIVE | Noted: 2021-03-12

## 2021-03-12 PROCEDURE — 99243 OFF/OP CNSLTJ NEW/EST LOW 30: CPT | Performed by: ORTHOPAEDIC SURGERY

## 2021-03-12 PROCEDURE — 1036F TOBACCO NON-USER: CPT | Performed by: ORTHOPAEDIC SURGERY

## 2021-03-12 PROCEDURE — 3079F DIAST BP 80-89 MM HG: CPT | Performed by: ORTHOPAEDIC SURGERY

## 2021-03-12 PROCEDURE — 3008F BODY MASS INDEX DOCD: CPT | Performed by: ORTHOPAEDIC SURGERY

## 2021-03-12 PROCEDURE — 3074F SYST BP LT 130 MM HG: CPT | Performed by: ORTHOPAEDIC SURGERY

## 2021-03-12 PROCEDURE — 73562 X-RAY EXAM OF KNEE 3: CPT

## 2021-03-12 RX ORDER — MELOXICAM 7.5 MG/1
7.5 TABLET ORAL DAILY
Qty: 40 TABLET | Refills: 0 | Status: SHIPPED | OUTPATIENT
Start: 2021-03-12 | End: 2021-04-02

## 2021-03-12 NOTE — PROGRESS NOTES
Patient Name:  Liset Guallpa  MRN:  [id number]    Assessment & Plan     Mild osteoarthritis of bilateral knees, possible medial meniscus tear right knee  1  Discussed treatment options including activity modification, medication, physical therapy and corticosteroid injection  2  Patient declined CSI and oral steroid taper, which were ineffective for a past episode of shoulder pain  3  Prescribed meloxicam 7 5 mg daily  4  Follow up in 6 weeks if pain persists  Consider MRI right knee if not improving  Chief Complaint     Bilateral knee pain    History of the Present Illness     Liset Guallpa is a 63 y o  male who presents for orthopedic consultation requested by Ardis Gosselin, MD for bilateral knee pain  Patient reports that the pain has been present for longer than 2 years  In the past 2 years since he stopped working, the pain has worsened  He reports the pain is throbbing in quality  Sometimes has pain at night and also has pain with rising from a chair  The pain is moderate in severity and localizes anteriorly without radiation  He tried Tylenol with no relief  Physical Exam     /80   Pulse 82   Ht 5' 9" (1 753 m)   Wt 129 kg (285 lb)   BMI 42 09 kg/m²     Right knee:  Effusion:  None  Tenderness:  Medial joint line  Range of motion:  Extension:  Normal  Flexion:  Normal  Lachman test:  Stable  Valgus stress:  Stable  Varus stress:  Stable  Posterior drawer test:  Stable  Claritza's test:  Negative    Left knee:  Effusion:  None  Tenderness:  None  Range of motion:  Extension:  Normal  Flexion:  Normal  Lachman test:  Stable  Valgus stress:  Stable  Varus stress:  Stable  Posterior drawer test:  Stable  Claritza's test:  Negative    Eyes:  Anicteric sclerae  ENT:  Trachea midline  Lungs:  Normal respiratory effort  Cardiovascular:  Capillary refill is less than 2 seconds  Lymphatic:  No palpable lymphadenopathy  Skin:  Intact without erythema    Neurologic:  Sensation grossly intact to light touch  Psychiatric:  Mood and affect are appropriate  Data Review     I have personally reviewed pertinent films in PACS, and my interpretation follows:    XR bilateral knees 3/12/2021:  Mild degenerative changes  No acute bony abnormalities  Past Medical History:   Diagnosis Date    A-fib [facility])     Arthritis     Cardiac arrhythmia     afib s/p ablasion     GERD (gastroesophageal reflux disease)     Hyperlipidemia     ldl 123 from 179 from 138, -  zocor     Hypertension     Metabolic syndrome     history of metabolic syndrome with insulin resistance  Diet and lifestyle modification, weight reduction, daily exercise  Patient education regarding metabolic syndrome, insulin resistant, dyslipidemia   Nocturia     Renal disorder     chronic kidney disease stage 2    Restless legs     Seasonal allergies     Sleep apnea     CPAP    Vertigo        Past Surgical History:   Procedure Laterality Date    COLONOSCOPY  01/01/2012    no polyps    PALATE / UVULA BIOPSY / EXCISION      SHOULDER SURGERY Bilateral     SHOULDER SURGERY Bilateral     s/p 4 surgery - 2 on each side         No Known Allergies    Current Outpatient Medications on File Prior to Visit   Medication Sig Dispense Refill    aspirin (ECOTRIN LOW STRENGTH) 81 mg EC tablet Take 81 mg by mouth daily      lisinopril (ZESTRIL) 20 mg tablet Take 1 tablet (20 mg total) by mouth daily 90 tablet 1    metoprolol tartrate (LOPRESSOR) 50 mg tablet Take 1 tablet (50 mg total) by mouth every 12 (twelve) hours 180 tablet 1    neomycin-polymyxin-hydrocortisone (CORTISPORIN) 0 35%-10,000 units/mL-1% otic suspension Administer 4 drops to the right ear 4 (four) times a day 5 mL 0    omeprazole (PriLOSEC) 20 mg delayed release capsule TAKE 1 CAPSULE (20 MG TOTAL) BY MOUTH DAILY BEFORE BREAKFAST 30 capsule 5    rOPINIRole (REQUIP) 1 mg tablet Take 1 tablet (1 mg total) by mouth daily at bedtime 90 tablet 1    simvastatin (ZOCOR) 40 mg tablet Take 1 tablet (40 mg total) by mouth daily at bedtime 90 tablet 3    tamsulosin (FLOMAX) 0 4 mg Take 1 capsule (0 4 mg total) by mouth daily with dinner 90 capsule 1    [DISCONTINUED] escitalopram (LEXAPRO) 10 mg tablet TAKE 1 TABLET BY MOUTH EVERY DAY (Patient not taking: Reported on 2/11/2021) 90 tablet 0     No current facility-administered medications on file prior to visit  Social History     Tobacco Use    Smoking status: Never Smoker    Smokeless tobacco: Never Used   Substance Use Topics    Alcohol use: Yes     Comment: occasional    Drug use: Never       Family History   Problem Relation Age of Onset    Cancer Mother     Heart disease Father     Diabetes Sister     Heart disease Brother     Cancer Brother        Review of Systems     As stated in the HPI  All other systems were reviewed and are negative        Procedures    Scribe Attestation    I,:  Carola Tapia PA-C am acting as a scribe while in the presence of the attending physician :       I,:  Aris De Luna MD personally performed the services described in this documentation    as scribed in my presence :

## 2021-03-16 ENCOUNTER — APPOINTMENT (OUTPATIENT)
Dept: LAB | Facility: MEDICAL CENTER | Age: 61
End: 2021-03-16
Payer: COMMERCIAL

## 2021-03-16 DIAGNOSIS — N18.31 STAGE 3A CHRONIC KIDNEY DISEASE (HCC): ICD-10-CM

## 2021-03-16 LAB
ANION GAP SERPL CALCULATED.3IONS-SCNC: 4 MMOL/L (ref 4–13)
BUN SERPL-MCNC: 21 MG/DL (ref 5–25)
CALCIUM SERPL-MCNC: 9.5 MG/DL (ref 8.3–10.1)
CHLORIDE SERPL-SCNC: 107 MMOL/L (ref 100–108)
CO2 SERPL-SCNC: 28 MMOL/L (ref 21–32)
CREAT SERPL-MCNC: 1.51 MG/DL (ref 0.6–1.3)
GFR SERPL CREATININE-BSD FRML MDRD: 49 ML/MIN/1.73SQ M
GLUCOSE P FAST SERPL-MCNC: 121 MG/DL (ref 65–99)
POTASSIUM SERPL-SCNC: 4.6 MMOL/L (ref 3.5–5.3)
SODIUM SERPL-SCNC: 139 MMOL/L (ref 136–145)

## 2021-03-16 PROCEDURE — 36415 COLL VENOUS BLD VENIPUNCTURE: CPT

## 2021-03-16 PROCEDURE — 80048 BASIC METABOLIC PNL TOTAL CA: CPT

## 2021-03-17 ENCOUNTER — IMMUNIZATIONS (OUTPATIENT)
Dept: FAMILY MEDICINE CLINIC | Facility: HOSPITAL | Age: 61
End: 2021-03-17

## 2021-03-17 DIAGNOSIS — Z23 ENCOUNTER FOR IMMUNIZATION: Primary | ICD-10-CM

## 2021-03-17 PROCEDURE — 0001A SARS-COV-2 / COVID-19 MRNA VACCINE (PFIZER-BIONTECH) 30 MCG: CPT

## 2021-03-17 PROCEDURE — 91300 SARS-COV-2 / COVID-19 MRNA VACCINE (PFIZER-BIONTECH) 30 MCG: CPT

## 2021-04-02 DIAGNOSIS — M17.12 PRIMARY OSTEOARTHRITIS OF LEFT KNEE: ICD-10-CM

## 2021-04-02 DIAGNOSIS — M17.11 PRIMARY OSTEOARTHRITIS OF RIGHT KNEE: ICD-10-CM

## 2021-04-02 RX ORDER — MELOXICAM 7.5 MG/1
TABLET ORAL
Qty: 30 TABLET | Refills: 1 | Status: SHIPPED | OUTPATIENT
Start: 2021-04-02 | End: 2021-05-13 | Stop reason: CLARIF

## 2021-04-12 ENCOUNTER — IMMUNIZATIONS (OUTPATIENT)
Dept: FAMILY MEDICINE CLINIC | Facility: HOSPITAL | Age: 61
End: 2021-04-12

## 2021-04-12 DIAGNOSIS — Z23 ENCOUNTER FOR IMMUNIZATION: Primary | ICD-10-CM

## 2021-04-12 PROCEDURE — 91300 SARS-COV-2 / COVID-19 MRNA VACCINE (PFIZER-BIONTECH) 30 MCG: CPT

## 2021-04-12 PROCEDURE — 0002A SARS-COV-2 / COVID-19 MRNA VACCINE (PFIZER-BIONTECH) 30 MCG: CPT

## 2021-04-23 ENCOUNTER — OFFICE VISIT (OUTPATIENT)
Dept: OBGYN CLINIC | Facility: CLINIC | Age: 61
End: 2021-04-23
Payer: COMMERCIAL

## 2021-04-23 VITALS
WEIGHT: 286 LBS | BODY MASS INDEX: 42.36 KG/M2 | DIASTOLIC BLOOD PRESSURE: 79 MMHG | HEART RATE: 61 BPM | HEIGHT: 69 IN | SYSTOLIC BLOOD PRESSURE: 127 MMHG

## 2021-04-23 DIAGNOSIS — M23.91 INTERNAL DERANGEMENT OF BOTH KNEES: Primary | ICD-10-CM

## 2021-04-23 DIAGNOSIS — M23.92 INTERNAL DERANGEMENT OF BOTH KNEES: Primary | ICD-10-CM

## 2021-04-23 PROCEDURE — 3074F SYST BP LT 130 MM HG: CPT | Performed by: ORTHOPAEDIC SURGERY

## 2021-04-23 PROCEDURE — 1036F TOBACCO NON-USER: CPT | Performed by: ORTHOPAEDIC SURGERY

## 2021-04-23 PROCEDURE — 3008F BODY MASS INDEX DOCD: CPT | Performed by: ORTHOPAEDIC SURGERY

## 2021-04-23 PROCEDURE — 99213 OFFICE O/P EST LOW 20 MIN: CPT | Performed by: ORTHOPAEDIC SURGERY

## 2021-04-23 PROCEDURE — 3078F DIAST BP <80 MM HG: CPT | Performed by: ORTHOPAEDIC SURGERY

## 2021-04-23 NOTE — PROGRESS NOTES
Patient Name:  Alisson Branham  MRN:  [id number]    Assessment & Plan     Bilateral knee pain refractory to conservative management including home exercises and activity modification  Possible medial meniscal pathology bilaterally  1  MRIs bilateral knees  2  Continue activity modification as needed  3  Follow up after MRIs    History of the Present Illness     59-year-old male returns to the office today for follow-up regarding his bilateral knees  He was last seen on 3/12/21  At that time he was prescribed meloxicam and activity modification and home exercises were recommended  He returns to the office today noting persistent pain  Pain is localized to the medial aspect of the knees is with associated swelling  Pain is worse with prolonged standing and walking  The pain limits his ability to ambulate long distances  He notes associated weakness and subjective instability  Meloxicam provided minimal relief  General ROS:  Negative for fever or chills  Neurological ROS:  Negative for numbness or tingling  Physical Exam     /79   Pulse 61   Ht 5' 9" (1 753 m)   Wt 130 kg (286 lb)   BMI 42 23 kg/m²     Bilateral knees:  Skin intact  No gross deformity  No erythema ecchymosis or swelling  No effusion  Tenderness to palpation medial joint lines bilaterally  Range of motion 0-120 bilaterally  Stable to varus and valgus stress  Stable Lachman test   Positive Claritza's test bilaterally  Sensation intact distally  Skin warm and well perfused  Appropriate mood and affect  Data Review     I have personally reviewed pertinent films in PACS, and my interpretation follows  X-rays bilateral knees 3/12/21:  Mild tricompartmental degenerative changes  No acute osseous abnormalities      Social History     Tobacco Use    Smoking status: Never Smoker    Smokeless tobacco: Never Used   Substance Use Topics    Alcohol use: Yes     Comment: occasional    Drug use: Never       Scribe Attestation    I,:  Kar Olivares PA-C am acting as a scribe while in the presence of the attending physician :       I,:  Elizabeth Darling MD personally performed the services described in this documentation    as scribed in my presence :

## 2021-05-12 ENCOUNTER — HOSPITAL ENCOUNTER (OUTPATIENT)
Dept: RADIOLOGY | Age: 61
Discharge: HOME | End: 2021-05-12
Payer: COMMERCIAL

## 2021-05-12 DIAGNOSIS — M23.92 INTERNAL DERANGEMENT OF BOTH KNEES: ICD-10-CM

## 2021-05-12 DIAGNOSIS — M23.91 INTERNAL DERANGEMENT OF BOTH KNEES: ICD-10-CM

## 2021-05-12 PROCEDURE — G1004 CDSM NDSC: HCPCS

## 2021-05-12 PROCEDURE — 73721 MRI JNT OF LWR EXTRE W/O DYE: CPT

## 2021-05-13 ENCOUNTER — OFFICE VISIT (OUTPATIENT)
Dept: FAMILY MEDICINE CLINIC | Facility: CLINIC | Age: 61
End: 2021-05-13
Payer: COMMERCIAL

## 2021-05-13 VITALS
DIASTOLIC BLOOD PRESSURE: 70 MMHG | RESPIRATION RATE: 16 BRPM | OXYGEN SATURATION: 97 % | SYSTOLIC BLOOD PRESSURE: 120 MMHG | BODY MASS INDEX: 43.22 KG/M2 | HEIGHT: 69 IN | HEART RATE: 63 BPM | TEMPERATURE: 98.6 F | WEIGHT: 291.8 LBS

## 2021-05-13 DIAGNOSIS — I10 ESSENTIAL HYPERTENSION: Primary | ICD-10-CM

## 2021-05-13 DIAGNOSIS — F32.0 CURRENT MILD EPISODE OF MAJOR DEPRESSIVE DISORDER WITHOUT PRIOR EPISODE (HCC): ICD-10-CM

## 2021-05-13 DIAGNOSIS — M17.0 PRIMARY OSTEOARTHRITIS OF BOTH KNEES: ICD-10-CM

## 2021-05-13 DIAGNOSIS — N18.2 CKD (CHRONIC KIDNEY DISEASE) STAGE 2, GFR 60-89 ML/MIN: ICD-10-CM

## 2021-05-13 PROCEDURE — 99214 OFFICE O/P EST MOD 30 MIN: CPT | Performed by: INTERNAL MEDICINE

## 2021-05-13 NOTE — PROGRESS NOTES
Assessment/Plan:         Problem List Items Addressed This Visit        Cardiovascular and Mediastinum    Essential hypertension - Primary     Controlled with lisinopril  Tolerating well  monitor            Musculoskeletal and Integument    Primary osteoarthritis of both knees       Seen by Orthopedic  MRI schedule  Counseling given on strengthening exercise of his quad muscles  Genitourinary    CKD (chronic kidney disease) stage 2, GFR 60-89 ml/min     Lab Results   Component Value Date    EGFR 49 03/16/2021    EGFR 49 02/12/2021    CREATININE 1 51 (H) 03/16/2021    CREATININE 1 52 (H) 02/12/2021   gfr 49-53  Recheck bmp  stoped mobic  On lisinopril  Relevant Orders    Basic metabolic panel       Other    Current mild episode of major depressive disorder without prior episode (HCC)            Subjective:      Patient ID: Med Alexandra is a 63 y o  male  1  htn-  Blood pressure under control with current medications  Tolerating medicine well  No headache dizziness or light headedness  No syncope  No increased edema  No orthopnea  No chest pain  He does have chronic dyspnea on exertion  His stress test was done last year and it was normal according to the patient  Dyspnea on exertion is not any worse  He also has some deconditioning because of his knees  2  ckd-3-  GFR is 49-53  Meloxicam was discontinued  We will recheck his BMP before next appointment  No trouble urinating  3  Knee djd-  He does have knee pain in both side  Seen by an orthopedic doctor  MRI schedule  Then further treatment will be decided        The following portions of the patient's history were reviewed and updated as appropriate:   Past Medical History:  He has a past medical history of A-fib ([address] ), Arthritis, Cardiac arrhythmia, GERD (gastroesophageal reflux disease), Hyperlipidemia, Hypertension, Metabolic syndrome, Nocturia, Renal disorder, Restless legs, Seasonal allergies, Sleep apnea, and Vertigo  ,  _______________________________________________________________________  Medical Problems:  does not have any pertinent problems on file ,  _______________________________________________________________________  Past Surgical History:   has a past surgical history that includes Palate / uvula biopsy / excision; Shoulder surgery (Bilateral); Colonoscopy (01/01/2012); and Shoulder surgery (Bilateral)  ,  _______________________________________________________________________  Family History:  family history includes Cancer in his brother and mother; Diabetes in his sister; Heart disease in his brother and father ,  _______________________________________________________________________  Social History:   reports that he has never smoked  He has never used smokeless tobacco  He reports current alcohol use  He reports that he does not use drugs  ,  _______________________________________________________________________  Allergies:  has No Known Allergies     _______________________________________________________________________  Current Outpatient Medications   Medication Sig Dispense Refill    aspirin (ECOTRIN LOW STRENGTH) 81 mg EC tablet Take 81 mg by mouth daily      lisinopril (ZESTRIL) 20 mg tablet Take 1 tablet (20 mg total) by mouth daily 90 tablet 1    metoprolol tartrate (LOPRESSOR) 50 mg tablet Take 1 tablet (50 mg total) by mouth every 12 (twelve) hours 180 tablet 1    omeprazole (PriLOSEC) 20 mg delayed release capsule TAKE 1 CAPSULE (20 MG TOTAL) BY MOUTH DAILY BEFORE BREAKFAST 30 capsule 5    rOPINIRole (REQUIP) 1 mg tablet Take 1 tablet (1 mg total) by mouth daily at bedtime 90 tablet 1    simvastatin (ZOCOR) 40 mg tablet Take 1 tablet (40 mg total) by mouth daily at bedtime 90 tablet 3    tamsulosin (FLOMAX) 0 4 mg Take 1 capsule (0 4 mg total) by mouth daily with dinner 90 capsule 1    neomycin-polymyxin-hydrocortisone (CORTISPORIN) 0 35%-10,000 units/mL-1% otic suspension Administer 4 drops to the right ear 4 (four) times a day (Patient not taking: Reported on 5/13/2021) 5 mL 0     No current facility-administered medications for this visit       _______________________________________________________________________  Review of Systems   Constitutional: Negative for chills, fatigue and fever  HENT: Negative for congestion, nosebleeds and rhinorrhea  Eyes: Negative for discharge and visual disturbance  Respiratory: Positive for shortness of breath (MURRAY- chronic  normal stress test last year)  Negative for cough, chest tightness and wheezing  Cardiovascular: Negative for chest pain  Gastrointestinal: Negative for abdominal distention, abdominal pain, constipation, diarrhea and vomiting  Endocrine: Negative for polydipsia and polyuria  Genitourinary: Negative for difficulty urinating, frequency and hematuria  Musculoskeletal: Positive for arthralgias  Negative for back pain and myalgias  Skin: Negative for rash  Allergic/Immunologic: Negative for environmental allergies  Neurological: Negative for dizziness, syncope, weakness, light-headedness and headaches  Hematological: Negative  Psychiatric/Behavioral: Negative for agitation, confusion, decreased concentration and dysphoric mood  The patient is not nervous/anxious  All other systems reviewed and are negative  Objective:  Vitals:    05/13/21 1402   BP: 120/70   BP Location: Left arm   Patient Position: Sitting   Cuff Size: Large   Pulse: 63   Resp: 16   Temp: 98 6 °F (37 °C)   TempSrc: Temporal   SpO2: 97%   Weight: 132 kg (291 lb 12 8 oz)   Height: 5' 9" (1 753 m)     Body mass index is 43 09 kg/m²  Physical Exam  Vitals signs and nursing note reviewed  Constitutional:       General: He is not in acute distress  Appearance: Normal appearance  He is well-developed  HENT:      Head: Normocephalic and atraumatic        Mouth/Throat:      Mouth: Mucous membranes are moist    Eyes:      General: Right eye: No discharge  Left eye: No discharge  Neck:      Musculoskeletal: Neck supple  Thyroid: No thyromegaly  Cardiovascular:      Rate and Rhythm: Normal rate and regular rhythm  Pulses: Normal pulses  Heart sounds: Normal heart sounds  No murmur  Pulmonary:      Effort: Pulmonary effort is normal       Breath sounds: Normal breath sounds  No wheezing or rhonchi  Abdominal:      General: Bowel sounds are normal  There is no distension  Palpations: Abdomen is soft  Tenderness: There is no abdominal tenderness  Musculoskeletal:         General: Tenderness (knees) present  No swelling  Right lower leg: No edema  Left lower leg: No edema  Lymphadenopathy:      Cervical: No cervical adenopathy  Skin:     General: Skin is warm  Capillary Refill: Capillary refill takes less than 2 seconds  Findings: No erythema  Neurological:      General: No focal deficit present  Mental Status: He is alert and oriented to person, place, and time  Psychiatric:         Mood and Affect: Mood normal          Behavior: Behavior normal          Thought Content:  Thought content normal

## 2021-05-18 ENCOUNTER — TELEPHONE (OUTPATIENT)
Dept: OBGYN CLINIC | Facility: CLINIC | Age: 61
End: 2021-05-18

## 2021-06-01 ENCOUNTER — OFFICE VISIT (OUTPATIENT)
Dept: OBGYN CLINIC | Facility: CLINIC | Age: 61
End: 2021-06-01
Payer: COMMERCIAL

## 2021-06-01 VITALS
SYSTOLIC BLOOD PRESSURE: 136 MMHG | HEIGHT: 69 IN | HEART RATE: 69 BPM | WEIGHT: 288 LBS | DIASTOLIC BLOOD PRESSURE: 92 MMHG | BODY MASS INDEX: 42.65 KG/M2

## 2021-06-01 DIAGNOSIS — S83.232A COMPLEX TEAR OF MEDIAL MENISCUS OF LEFT KNEE AS CURRENT INJURY, INITIAL ENCOUNTER: ICD-10-CM

## 2021-06-01 DIAGNOSIS — S83.231A COMPLEX TEAR OF MEDIAL MENISCUS OF RIGHT KNEE AS CURRENT INJURY, INITIAL ENCOUNTER: Primary | ICD-10-CM

## 2021-06-01 PROCEDURE — 1036F TOBACCO NON-USER: CPT | Performed by: ORTHOPAEDIC SURGERY

## 2021-06-01 PROCEDURE — 99214 OFFICE O/P EST MOD 30 MIN: CPT | Performed by: ORTHOPAEDIC SURGERY

## 2021-06-01 PROCEDURE — 3075F SYST BP GE 130 - 139MM HG: CPT | Performed by: ORTHOPAEDIC SURGERY

## 2021-06-01 PROCEDURE — 3008F BODY MASS INDEX DOCD: CPT | Performed by: ORTHOPAEDIC SURGERY

## 2021-06-01 PROCEDURE — 3080F DIAST BP >= 90 MM HG: CPT | Performed by: ORTHOPAEDIC SURGERY

## 2021-06-01 RX ORDER — CHLORHEXIDINE GLUCONATE 4 G/100ML
SOLUTION TOPICAL DAILY PRN
Status: CANCELLED | OUTPATIENT
Start: 2021-06-01

## 2021-06-01 RX ORDER — CHLORHEXIDINE GLUCONATE 0.12 MG/ML
15 RINSE ORAL ONCE
Status: CANCELLED | OUTPATIENT
Start: 2021-06-01 | End: 2021-06-01

## 2021-06-01 NOTE — PROGRESS NOTES
Patient Name:  Layne Benoit  MRN:  [id number]    Assessment & Plan     Bilateral knee medial meniscal tears  1  Patient wishes to pursue surgical intervention of the with regards to his bilateral knee meniscal tears  Recommend surgery consisting of knee arthroscopy with partial medial meniscectomy  Explained the procedure in detail as well as the risks and benefits and expected recovery period  He has elected to proceed  Surgeries will be performed in a staged fashion starting with the right knee as this is bothering him more than the left  2  Follow up 10-14 days postoperatively  History of the Present Illness     18-year-old male returns to the office today for follow-up regarding his bilateral knees  He recently underwent MRIs and is here to review the results  He still notes persistent pain in the bilateral knees  Pain is worse in the right knee compared to the left  Pain is localized to the medial aspect of the knees bilaterally  He notes associated swelling  Pain is worse with walking up and down steps  He notes subjective weakness and instability  He notes minimal relief while taking meloxicam   No numbness or tingling  No fevers or chills  General ROS:  Negative for fever or chills  Neurological ROS:  Negative for numbness or tingling  Physical Exam     /92   Pulse 69   Ht 5' 9" (1 753 m)   Wt 131 kg (288 lb)   BMI 42 53 kg/m²     Right knee:  Skin intact  No gross deformity  Trace effusion  Tenderness to palpation medial joint line  Range of motion 0-120 with pain at terminal flexion  Stable to varus and valgus stress  Stable Lachman test   Positive Claritza's test     Left knee: Skin intact  No gross deformity  Trace effusion  Tenderness to palpation medial joint line  Range of motion 0-120 with pain at terminal flexion  Stable to varus and valgus stress  Stable Lachman test   Positive Claritza's test     Eyes:  Anicteric sclerae    ENT:  Trachea midline  Lungs:  Normal respiratory effort  Cardiovascular:  Capillary refill is less than 2 seconds  Lymphatic:  No palpable lymphadenopathy  Skin:  Intact without erythema  Neurologic:  Sensation grossly intact to light touch  Psychiatric:  Mood and affect are appropriate  Data Review     I have personally reviewed pertinent films in PACS, and my interpretation follows  MRI left knee 5/12/21:  Complex tear of the body and posterior horn medial meniscus  Grade 2 chondrosis medial femoral condyle  MRI right knee 5/12/21:  Complex tear posterior horn medial meniscus with associated parameniscal cyst   Grade 2-3 chondrosis medial femoral condyle        Social History     Tobacco Use    Smoking status: Never Smoker    Smokeless tobacco: Never Used   Substance Use Topics    Alcohol use: Yes     Comment: occasional    Drug use: Never         Scribe Attestation    I,:  Davon Frost PA-C am acting as a scribe while in the presence of the attending physician :       I,:  Connor Alan MD personally performed the services described in this documentation    as scribed in my presence :

## 2021-07-30 ENCOUNTER — ANESTHESIA EVENT (OUTPATIENT)
Dept: PERIOP | Facility: AMBULARY SURGERY CENTER | Age: 61
End: 2021-07-30
Payer: COMMERCIAL

## 2021-08-01 DIAGNOSIS — N40.0 BPH WITHOUT OBSTRUCTION/LOWER URINARY TRACT SYMPTOMS: ICD-10-CM

## 2021-08-02 RX ORDER — TAMSULOSIN HYDROCHLORIDE 0.4 MG/1
CAPSULE ORAL
Qty: 90 CAPSULE | Refills: 1 | Status: SHIPPED | OUTPATIENT
Start: 2021-08-02 | End: 2022-01-24

## 2021-08-02 NOTE — PRE-PROCEDURE INSTRUCTIONS
Pre-Surgery Instructions:   Medication Instructions    aspirin (ECOTRIN LOW STRENGTH) 81 mg EC tablet Patient was instructed by Physician and understands   lisinopril (ZESTRIL) 20 mg tablet Instructed patient per Anesthesia Guidelines   metoprolol tartrate (LOPRESSOR) 50 mg tablet Instructed patient per Anesthesia Guidelines   omeprazole (PriLOSEC) 20 mg delayed release capsule Instructed patient per Anesthesia Guidelines   rOPINIRole (REQUIP) 1 mg tablet Instructed patient per Anesthesia Guidelines   simvastatin (ZOCOR) 40 mg tablet Instructed patient per Anesthesia Guidelines   tamsulosin (FLOMAX) 0 4 mg Instructed patient per Anesthesia Guidelines  Pre op and bathing instructions reviewed  Pt has hibiclens  Pt  Verbalized understanding of current visitor restrictions  Pt  Verbalized an understanding of all instructions reviewed and offers no concerns at this time  Instructed to avoid all NSAIDs and OTC Vit/Supp from now until after surgery   Tylenol ok prn  Instructed to take per normal schedule except DOS   Pt will check with PCP for ASA instructions  DOS instructed to take Metoprolol and Omeprazole with a few sips of H2O

## 2021-08-03 ENCOUNTER — APPOINTMENT (OUTPATIENT)
Dept: LAB | Facility: MEDICAL CENTER | Age: 61
End: 2021-08-03
Payer: COMMERCIAL

## 2021-08-03 ENCOUNTER — CLINICAL SUPPORT (OUTPATIENT)
Dept: URGENT CARE | Facility: MEDICAL CENTER | Age: 61
End: 2021-08-03
Payer: COMMERCIAL

## 2021-08-03 ENCOUNTER — TELEPHONE (OUTPATIENT)
Dept: FAMILY MEDICINE CLINIC | Facility: CLINIC | Age: 61
End: 2021-08-03

## 2021-08-03 DIAGNOSIS — N18.2 CKD (CHRONIC KIDNEY DISEASE) STAGE 2, GFR 60-89 ML/MIN: ICD-10-CM

## 2021-08-03 DIAGNOSIS — S83.231A COMPLEX TEAR OF MEDIAL MENISCUS OF RIGHT KNEE AS CURRENT INJURY, INITIAL ENCOUNTER: ICD-10-CM

## 2021-08-03 LAB
ANION GAP SERPL CALCULATED.3IONS-SCNC: 5 MMOL/L (ref 4–13)
ATRIAL RATE: 76 BPM
BUN SERPL-MCNC: 19 MG/DL (ref 5–25)
CALCIUM SERPL-MCNC: 9.2 MG/DL (ref 8.3–10.1)
CHLORIDE SERPL-SCNC: 109 MMOL/L (ref 100–108)
CO2 SERPL-SCNC: 27 MMOL/L (ref 21–32)
CREAT SERPL-MCNC: 1.37 MG/DL (ref 0.6–1.3)
GFR SERPL CREATININE-BSD FRML MDRD: 55 ML/MIN/1.73SQ M
GLUCOSE P FAST SERPL-MCNC: 124 MG/DL (ref 65–99)
P AXIS: 25 DEGREES
POTASSIUM SERPL-SCNC: 4.5 MMOL/L (ref 3.5–5.3)
PR INTERVAL: 142 MS
QRS AXIS: -9 DEGREES
QRSD INTERVAL: 84 MS
QT INTERVAL: 366 MS
QTC INTERVAL: 411 MS
SODIUM SERPL-SCNC: 141 MMOL/L (ref 136–145)
T WAVE AXIS: 4 DEGREES
VENTRICULAR RATE: 76 BPM

## 2021-08-03 PROCEDURE — 36415 COLL VENOUS BLD VENIPUNCTURE: CPT

## 2021-08-03 PROCEDURE — 93010 ELECTROCARDIOGRAM REPORT: CPT | Performed by: INTERNAL MEDICINE

## 2021-08-03 PROCEDURE — 93005 ELECTROCARDIOGRAM TRACING: CPT

## 2021-08-03 PROCEDURE — 80048 BASIC METABOLIC PNL TOTAL CA: CPT

## 2021-08-03 NOTE — PROGRESS NOTES
Vicente Gonsales had walk-in EKG completed on 08/03/21 at 1:03 PM by Lizz Mulligan for pre surgical testing

## 2021-08-03 NOTE — TELEPHONE ENCOUNTER
Pt's wife called and stated that she would like to know if he should stop his aspirin prior to having surgery ,if so for how long before and after    , please advise

## 2021-08-03 NOTE — TELEPHONE ENCOUNTER
----- Message from Jossy Casas MD sent at 8/3/2021  3:03 PM EDT -----  Please call the patient regarding his abnormal result     Kidney function is better

## 2021-08-03 NOTE — TELEPHONE ENCOUNTER
Left a message for the patient's wife with instructions  Advised her to call the office back with any questions or concerns

## 2021-08-03 NOTE — TELEPHONE ENCOUNTER
Called and left message on patient voicemail labs are normal, questions to call office    Kiera Hyde

## 2021-08-05 ENCOUNTER — HOSPITAL ENCOUNTER (OUTPATIENT)
Facility: AMBULARY SURGERY CENTER | Age: 61
Setting detail: OUTPATIENT SURGERY
Discharge: HOME | End: 2021-08-05
Attending: ORTHOPAEDIC SURGERY | Admitting: ORTHOPAEDIC SURGERY
Payer: COMMERCIAL

## 2021-08-05 ENCOUNTER — TELEPHONE (OUTPATIENT)
Dept: OBGYN CLINIC | Facility: HOSPITAL | Age: 61
End: 2021-08-05

## 2021-08-05 ENCOUNTER — ANESTHESIA (OUTPATIENT)
Dept: PERIOP | Facility: AMBULARY SURGERY CENTER | Age: 61
End: 2021-08-05
Payer: COMMERCIAL

## 2021-08-05 VITALS
TEMPERATURE: 97.2 F | RESPIRATION RATE: 18 BRPM | HEIGHT: 69 IN | SYSTOLIC BLOOD PRESSURE: 124 MMHG | BODY MASS INDEX: 41.47 KG/M2 | HEART RATE: 63 BPM | WEIGHT: 280 LBS | DIASTOLIC BLOOD PRESSURE: 71 MMHG | OXYGEN SATURATION: 95 %

## 2021-08-05 DIAGNOSIS — S83.231A COMPLEX TEAR OF MEDIAL MENISCUS OF RIGHT KNEE AS CURRENT INJURY, INITIAL ENCOUNTER: Primary | ICD-10-CM

## 2021-08-05 PROBLEM — K21.9 GASTROESOPHAGEAL REFLUX DISEASE: Status: ACTIVE | Noted: 2021-08-05

## 2021-08-05 PROBLEM — M17.11 PRIMARY OSTEOARTHRITIS OF RIGHT KNEE: Status: ACTIVE | Noted: 2021-08-05

## 2021-08-05 PROCEDURE — 99024 POSTOP FOLLOW-UP VISIT: CPT | Performed by: PHYSICIAN ASSISTANT

## 2021-08-05 PROCEDURE — 29881 ARTHRS KNE SRG MNISECTMY M/L: CPT | Performed by: ORTHOPAEDIC SURGERY

## 2021-08-05 RX ORDER — LIDOCAINE HYDROCHLORIDE 10 MG/ML
0.5 INJECTION, SOLUTION EPIDURAL; INFILTRATION; INTRACAUDAL; PERINEURAL ONCE AS NEEDED
Status: DISCONTINUED | OUTPATIENT
Start: 2021-08-05 | End: 2021-08-05 | Stop reason: HOSPADM

## 2021-08-05 RX ORDER — EPHEDRINE SULFATE 50 MG/ML
INJECTION INTRAVENOUS AS NEEDED
Status: DISCONTINUED | OUTPATIENT
Start: 2021-08-05 | End: 2021-08-05

## 2021-08-05 RX ORDER — ONDANSETRON 2 MG/ML
4 INJECTION INTRAMUSCULAR; INTRAVENOUS ONCE AS NEEDED
Status: DISCONTINUED | OUTPATIENT
Start: 2021-08-05 | End: 2021-08-05 | Stop reason: HOSPADM

## 2021-08-05 RX ORDER — CHLORHEXIDINE GLUCONATE 4 G/100ML
SOLUTION TOPICAL DAILY PRN
Status: DISCONTINUED | OUTPATIENT
Start: 2021-08-05 | End: 2021-08-05 | Stop reason: HOSPADM

## 2021-08-05 RX ORDER — DEXAMETHASONE SODIUM PHOSPHATE 10 MG/ML
INJECTION, SOLUTION INTRAMUSCULAR; INTRAVENOUS AS NEEDED
Status: DISCONTINUED | OUTPATIENT
Start: 2021-08-05 | End: 2021-08-05

## 2021-08-05 RX ORDER — KETOROLAC TROMETHAMINE 30 MG/ML
INJECTION, SOLUTION INTRAMUSCULAR; INTRAVENOUS AS NEEDED
Status: DISCONTINUED | OUTPATIENT
Start: 2021-08-05 | End: 2021-08-05

## 2021-08-05 RX ORDER — CEFAZOLIN SODIUM 2 G/50ML
2000 SOLUTION INTRAVENOUS ONCE
Status: COMPLETED | OUTPATIENT
Start: 2021-08-05 | End: 2021-08-05

## 2021-08-05 RX ORDER — CEFAZOLIN SODIUM 1 G/50ML
1000 SOLUTION INTRAVENOUS ONCE
Status: COMPLETED | OUTPATIENT
Start: 2021-08-05 | End: 2021-08-05

## 2021-08-05 RX ORDER — CHLORHEXIDINE GLUCONATE 0.12 MG/ML
15 RINSE ORAL ONCE
Status: DISCONTINUED | OUTPATIENT
Start: 2021-08-05 | End: 2021-08-05 | Stop reason: HOSPADM

## 2021-08-05 RX ORDER — LIDOCAINE HYDROCHLORIDE 10 MG/ML
INJECTION, SOLUTION EPIDURAL; INFILTRATION; INTRACAUDAL; PERINEURAL AS NEEDED
Status: DISCONTINUED | OUTPATIENT
Start: 2021-08-05 | End: 2021-08-05

## 2021-08-05 RX ORDER — PROPOFOL 10 MG/ML
INJECTION, EMULSION INTRAVENOUS AS NEEDED
Status: DISCONTINUED | OUTPATIENT
Start: 2021-08-05 | End: 2021-08-05

## 2021-08-05 RX ORDER — FENTANYL CITRATE/PF 50 MCG/ML
25 SYRINGE (ML) INJECTION
Status: DISCONTINUED | OUTPATIENT
Start: 2021-08-05 | End: 2021-08-05 | Stop reason: HOSPADM

## 2021-08-05 RX ORDER — OXYCODONE HYDROCHLORIDE 5 MG/1
5 TABLET ORAL EVERY 4 HOURS PRN
Qty: 12 TABLET | Refills: 0 | Status: SHIPPED | OUTPATIENT
Start: 2021-08-05 | End: 2021-08-08

## 2021-08-05 RX ORDER — ONDANSETRON 2 MG/ML
INJECTION INTRAMUSCULAR; INTRAVENOUS AS NEEDED
Status: DISCONTINUED | OUTPATIENT
Start: 2021-08-05 | End: 2021-08-05

## 2021-08-05 RX ORDER — HYDROMORPHONE HCL/PF 1 MG/ML
0.2 SYRINGE (ML) INJECTION
Status: DISCONTINUED | OUTPATIENT
Start: 2021-08-05 | End: 2021-08-05 | Stop reason: HOSPADM

## 2021-08-05 RX ORDER — ALBUTEROL SULFATE 90 UG/1
AEROSOL, METERED RESPIRATORY (INHALATION) AS NEEDED
Status: DISCONTINUED | OUTPATIENT
Start: 2021-08-05 | End: 2021-08-05

## 2021-08-05 RX ORDER — SODIUM CHLORIDE, SODIUM LACTATE, POTASSIUM CHLORIDE, CALCIUM CHLORIDE 600; 310; 30; 20 MG/100ML; MG/100ML; MG/100ML; MG/100ML
125 INJECTION, SOLUTION INTRAVENOUS CONTINUOUS
Status: DISCONTINUED | OUTPATIENT
Start: 2021-08-05 | End: 2021-08-05 | Stop reason: HOSPADM

## 2021-08-05 RX ADMIN — DEXAMETHASONE SODIUM PHOSPHATE 4 MG: 10 INJECTION, SOLUTION INTRAMUSCULAR; INTRAVENOUS at 13:32

## 2021-08-05 RX ADMIN — PROPOFOL 200 MG: 10 INJECTION, EMULSION INTRAVENOUS at 13:28

## 2021-08-05 RX ADMIN — CEFAZOLIN SODIUM 2000 MG: 2 SOLUTION INTRAVENOUS at 13:19

## 2021-08-05 RX ADMIN — EPHEDRINE SULFATE 10 MG: 50 INJECTION, SOLUTION INTRAVENOUS at 13:45

## 2021-08-05 RX ADMIN — EPHEDRINE SULFATE 10 MG: 50 INJECTION, SOLUTION INTRAVENOUS at 13:37

## 2021-08-05 RX ADMIN — KETOROLAC TROMETHAMINE 30 MG: 30 INJECTION, SOLUTION INTRAMUSCULAR at 14:04

## 2021-08-05 RX ADMIN — EPHEDRINE SULFATE 10 MG: 50 INJECTION, SOLUTION INTRAVENOUS at 13:57

## 2021-08-05 RX ADMIN — LIDOCAINE HYDROCHLORIDE 50 MG: 10 INJECTION, SOLUTION EPIDURAL; INFILTRATION; INTRACAUDAL at 13:28

## 2021-08-05 RX ADMIN — SODIUM CHLORIDE, SODIUM LACTATE, POTASSIUM CHLORIDE, AND CALCIUM CHLORIDE: .6; .31; .03; .02 INJECTION, SOLUTION INTRAVENOUS at 12:53

## 2021-08-05 RX ADMIN — ONDANSETRON 4 MG: 2 INJECTION INTRAMUSCULAR; INTRAVENOUS at 13:32

## 2021-08-05 RX ADMIN — CEFAZOLIN SODIUM 1000 MG: 1 SOLUTION INTRAVENOUS at 13:28

## 2021-08-05 RX ADMIN — ALBUTEROL SULFATE 10 PUFF: 90 AEROSOL, METERED RESPIRATORY (INHALATION) at 14:08

## 2021-08-05 NOTE — ANESTHESIA PREPROCEDURE EVALUATION
Procedure:  Knee Arthroscopic partial medial meniscectomy (Right Knee)    Relevant Problems   ANESTHESIA (within normal limits)   (-) History of anesthesia complications      CARDIO   (+) Essential hypertension   (-) Chest pain   (-) MURRAY (dyspnea on exertion)      GI/HEPATIC   (+) Gastroesophageal reflux disease (well controlled)      /RENAL   (+) CKD (chronic kidney disease) stage 2, GFR 60-89 ml/min      NEURO/PSYCH   (+) Current mild episode of major depressive disorder without prior episode (HCC)      PULMONARY   (-) Shortness of breath   (-) URI (upper respiratory infection)      Other   (+) Morbid obesity with BMI of 40 0-44 9, adult (HCC)        Physical Exam    Airway    Mallampati score: II  TM Distance: >3 FB  Neck ROM: full     Dental   No notable dental hx     Cardiovascular      Pulmonary      Other Findings        Anesthesia Plan  ASA Score- 3     Anesthesia Type- general with ASA Monitors  Additional Monitors:   Airway Plan: LMA  Plan Factors-Exercise tolerance (METS): >4 METS  Chart reviewed  EKG reviewed  Existing labs reviewed  Induction- intravenous  Postoperative Plan-     Informed Consent- Anesthetic plan and risks discussed with patient  I personally reviewed this patient with the CRNA  Discussed and agreed on the Anesthesia Plan with the CRNA  Faustino Vega

## 2021-08-05 NOTE — TELEPHONE ENCOUNTER
LM on  for return call  Spoke to Severino Avila in the office  She stated surgery scheduler advised patient is to go to the 3rd floor of the [facility] pav[facility] for surgery  Patient should be advised when call returned

## 2021-08-05 NOTE — H&P
Patient Name:  Carmina Schreiber  MRN:  [id number]      [address] [place] [address]     Right knee medial meniscus tear    Plan for right knee arthroscopic partial medial meniscectomy today  Follow up 10-14 days postoperatively  Chief Complaint     Right knee pain    History of the Present Illness     Carmina Schreiber is a 57 y o  male who presents today for elective right knee arthroscopy  He notes persistent pain localized to the medial aspect of the knee  He notes associated swelling and subjective weakness and instability  Pain is worse with walking up and down steps  Prior treatment includes meloxicam without relief    Physical Exam     Ht 5' 9" (1 753 m)   Wt 127 kg (280 lb)   BMI 41 35 kg/m²     Right knee:  Skin intact  No gross deformity  Trace effusion  Tenderness to palpation medial joint line  Range of motion 0-120 with pain at terminal flexion  Stable to varus and valgus stress  Stable Lachman test   Positive Claritza's test     Eyes:  Anicteric sclerae  ENT:  Trachea midline  Lungs:  Clear to auscultation bilaterally  Cardiovascular:  Regular rate and rhythm with audible S1 and S2  Abdomen:  Soft and nontender  Lymphatic:  No palpable lymphadenopathy  Skin:  Intact without erythema  Neurologic:  Sensation grossly intact to light touch  Psychiatric:  Mood and affect are appropriate  Data Review     I have personally reviewed pertinent films in PACS, and my interpretation follows  MRI right knee 5/12/21:  Complex tear posterior horn medial meniscus with associated parameniscal cyst   Grade 2-3 chondrosis medial femoral condyle  I have personally reviewed pertinent lab results      Lab Results   Component Value Date    K 4 5 08/03/2021     (H) 08/03/2021    CO2 27 08/03/2021    BUN 19 08/03/2021    CREATININE 1 37 (H) 08/03/2021     Lab Results   Component Value Date    WBC 7 08 02/12/2021    HGB 16 2 02/12/2021     02/12/2021     No results found for: PT, INR, PTT    Past Medical History:   Diagnosis Date    A-fib ([facility] [address] )     Arthritis     Cardiac arrhythmia     afib s/p ablasion     GERD (gastroesophageal reflux disease)     Hyperlipidemia     ldl 123 from 179 from 138, -  zocor     Hypertension     Metabolic syndrome     history of metabolic syndrome with insulin resistance  Diet and lifestyle modification, weight reduction, daily exercise  Patient education regarding metabolic syndrome, insulin resistant, dyslipidemia   Nocturia     Renal disorder     chronic kidney disease stage 2    Restless legs     Seasonal allergies     Sleep apnea     no cpap    Vertigo        Past Surgical History:   Procedure Laterality Date    CARDIAC ELECTROPHYSIOLOGY MAPPING AND ABLATION  2019    COLONOSCOPY  01/01/2012    no polyps    COLONOSCOPY      PALATE / UVULA BIOPSY / EXCISION      SHOULDER SURGERY Bilateral     SHOULDER SURGERY Bilateral     s/p 4 surgery - 2 on each side  No Known Allergies    No current facility-administered medications on file prior to encounter       Current Outpatient Medications on File Prior to Encounter   Medication Sig Dispense Refill    aspirin (ECOTRIN LOW STRENGTH) 81 mg EC tablet Take 81 mg by mouth daily      lisinopril (ZESTRIL) 20 mg tablet Take 1 tablet (20 mg total) by mouth daily (Patient taking differently: Take 20 mg by mouth every morning ) 90 tablet 1    metoprolol tartrate (LOPRESSOR) 50 mg tablet Take 1 tablet (50 mg total) by mouth every 12 (twelve) hours 180 tablet 1    omeprazole (PriLOSEC) 20 mg delayed release capsule TAKE 1 CAPSULE (20 MG TOTAL) BY MOUTH DAILY BEFORE BREAKFAST 30 capsule 5    rOPINIRole (REQUIP) 1 mg tablet Take 1 tablet (1 mg total) by mouth daily at bedtime 90 tablet 1    simvastatin (ZOCOR) 40 mg tablet Take 1 tablet (40 mg total) by mouth daily at bedtime 90 tablet 3       Social History     Tobacco Use    Smoking status: Never Smoker    Smokeless tobacco: Never Used Vaping Use    Vaping Use: Never used   Substance Use Topics    Alcohol use: Yes     Comment: occasional    Drug use: Never       Family History   Problem Relation Age of Onset    Cancer Mother     Heart disease Father     Diabetes Sister     Heart disease Brother     Cancer Brother        Review of Systems     As stated in the HPI  All other systems were reviewed and are negative

## 2021-08-05 NOTE — ANESTHESIA POSTPROCEDURE EVALUATION
Post-Op Assessment Note    CV Status:  Stable  Pain Score: 0    Pain management: adequate     Mental Status:  Alert and awake   Hydration Status:  Stable   PONV Controlled:  None   Airway Patency:  Patent      Post Op Vitals Reviewed: Yes      Staff: CRNA   Comments: AAOx3, SV Nonobstructed, VSS        No complications documented      BP   125/85   Temp     Pulse  68   Resp   24   SpO2   95

## 2021-08-05 NOTE — ANESTHESIA POSTPROCEDURE EVALUATION
Post-Op Assessment Note    No complications documented  /71 (08/05/21 1513)    Temp (!) 97 2 °F (36 2 °C) (08/05/21 1513)    Pulse 63 (08/05/21 1513)   Resp 18 (08/05/21 1513)    SpO2 95 % (08/05/21 1513)      Pt noted some numbness in L ring and pinky finger after transfer to phase 2 for 30min  Strength equal BL  Pt noted that he was resting his elbow on the side rail for some time prior to this happening  Pt's arms padded, <90 abduction in the OR  Discussed with pt that this should resolve without intervention in a matter of days to weeks        Addendum: on recheck, sensation now at baseline

## 2021-08-05 NOTE — TELEPHONE ENCOUNTER
Patient is calling to find out where he is going for his surgery today  Patient is stating that he only knows that he is going to a 2nd floor but not sure of which building  I tried to contact his  but she was not available  Patient has to be there at 11:30 am  I advised that if we did not call with info to check at info desk at [facility] entrance       [phone]

## 2021-08-05 NOTE — OP NOTE
OPERATIVE REPORT  PATIENT NAME: Neisha Noel    :  1960  MRN: [id number]  Pt Location: [facility] OR ROOM 06    SURGERY DATE: 2021    Surgeon(s) and Role:     * Kerri Barnes MD - Primary    Preop Diagnosis:  Complex tear of medial meniscus of right knee as current injury [S83 231A]    Post-Op Diagnosis Codes:     * Complex tear of medial meniscus of right knee as current injurY [S83 231A]     * Primary osteoarthritis of right knee [M17 11]    Procedure(s) (LRB):  KNEE ARTHROSCOPIC PARTIAL MEDIAL MENISCECTOMY, CHONDROPLASTY (Right)    Specimen(s):  * No specimens in log *    Estimated Blood Loss:   Minimal    Drains:  * No LDAs found *    Anesthesia Type:   General    Complications:   None    Procedure and Technique:  The patient was identified in the pre-operative holding area, and the surgical site was marked by the surgeon  The patient was transported to the operating room and transferred to the OR table in the supine position  General anesthesia was administered, and the patient was intubated  The right knee was prepped and draped in the usual sterile fashion  Prophylactic antibiotics were given within 1 hour of incision  Following a surgical timeout, the portal sites were injected with 10 mL of a 1:1 mixture of 0 25% bupivacaine and 1% lidocaine with epinephrine  An anterolateral portal was established with the 15-blade scalpel  The 4 mm 30-degree arthroscopic camera was placed in the medial compartment  An anteromedial portal was established under direct visualization  The medial meniscus demonstrated complex tear of the poster horn and body  The articular surface of the medial femoral condyle demonstrated a large area of grade 3 chondromalacia  The articular surface of the medial tibial plateau demonstrated diffuse grade 2 chondromalacia  Partial medial meniscectomy was performed with the basket forceps and motorized shaver  The medial meniscus was debrided back to a stable margin  Chondroplasty was performed on the medial femoral condyle and medial tibial plateau using the motorized shaver  The anterior cruciate ligament was intact  The lateral meniscus was intact  The articular surface of the lateral femoral condyle was intact  The articular surface of the lateral tibial plateau was intact  The articular surface of the patella demonstrated a small area of grade 2 chondromalacia  The articular surface of the trochlea demonstrated a moderate-sized region grade 3/4 chondromalacia centrally articulating with the patella between 70 and 90 degrees of knee flexion  The knee was irrigated extensively with the arthroscopic irrigation fluid  The portal sites were closed with simple buried 3-0 Monocryl suture and skin adhesive  The knee was injected with 20 mL of the local anesthetic mixture  The wounds were dressed with 4 x 4 gauze, cotton padding, and an elastic bandage  Anesthesia was reversed, and the patient was extubated  The patient was transferred to the stretcher and transported to the recovery area in stable condition      SIGNATURE: Bianca Del Angel MD  DATE: August 5, 2021  TIME: 2:11 PM

## 2021-08-06 DIAGNOSIS — I10 ESSENTIAL HYPERTENSION: ICD-10-CM

## 2021-08-06 RX ORDER — LISINOPRIL 20 MG/1
20 TABLET ORAL EVERY MORNING
Qty: 90 TABLET | Refills: 1 | Status: SHIPPED | OUTPATIENT
Start: 2021-08-06 | End: 2022-02-08

## 2021-08-06 RX ORDER — METOPROLOL TARTRATE 50 MG/1
TABLET, FILM COATED ORAL
Qty: 180 TABLET | Refills: 1 | Status: SHIPPED | OUTPATIENT
Start: 2021-08-06 | End: 2022-02-08

## 2021-08-09 DIAGNOSIS — E78.2 MIXED HYPERLIPIDEMIA: ICD-10-CM

## 2021-08-09 RX ORDER — SIMVASTATIN 40 MG
TABLET ORAL
Qty: 90 TABLET | Refills: 3 | Status: SHIPPED | OUTPATIENT
Start: 2021-08-09

## 2021-08-12 ENCOUNTER — OFFICE VISIT (OUTPATIENT)
Dept: FAMILY MEDICINE CLINIC | Facility: CLINIC | Age: 61
End: 2021-08-12
Payer: COMMERCIAL

## 2021-08-12 VITALS
RESPIRATION RATE: 16 BRPM | WEIGHT: 285.4 LBS | DIASTOLIC BLOOD PRESSURE: 70 MMHG | OXYGEN SATURATION: 98 % | HEIGHT: 69 IN | SYSTOLIC BLOOD PRESSURE: 120 MMHG | TEMPERATURE: 96.8 F | BODY MASS INDEX: 42.27 KG/M2 | HEART RATE: 69 BPM

## 2021-08-12 DIAGNOSIS — I10 ESSENTIAL HYPERTENSION: Primary | ICD-10-CM

## 2021-08-12 DIAGNOSIS — F32.0 CURRENT MILD EPISODE OF MAJOR DEPRESSIVE DISORDER WITHOUT PRIOR EPISODE (HCC): ICD-10-CM

## 2021-08-12 DIAGNOSIS — N18.2 CKD (CHRONIC KIDNEY DISEASE) STAGE 2, GFR 60-89 ML/MIN: ICD-10-CM

## 2021-08-12 PROCEDURE — 99214 OFFICE O/P EST MOD 30 MIN: CPT | Performed by: INTERNAL MEDICINE

## 2021-08-12 PROCEDURE — 1036F TOBACCO NON-USER: CPT | Performed by: INTERNAL MEDICINE

## 2021-08-12 NOTE — PROGRESS NOTES
Assessment/Plan:         Problem List Items Addressed This Visit        Cardiovascular and Mediastinum    Essential hypertension - Primary     Controlled  Bmp reviewed  Continue current meds  Genitourinary    CKD (chronic kidney disease) stage 2, GFR 60-89 ml/min     Lab Results   Component Value Date    EGFR 55 08/03/2021    EGFR 49 03/16/2021    EGFR 49 02/12/2021    CREATININE 1 37 (H) 08/03/2021    CREATININE 1 51 (H) 03/16/2021    CREATININE 1 52 (H) 02/12/2021   gfr 55 from 49  Bmp reviewed            Other    Current mild episode of major depressive disorder without prior episode (HCC)            Subjective:      Patient ID: Nelida Addison is a 57 y o  male  1  htn-  Blood pressure under control  Tolerating medicine well without any headache dizziness or lightheadedness  No edema  No chest pain or dyspnea  No syncope or palpitations  2  Dyslipidemia-  Tolerating Zocor well  No myalgia or muscle ache  No claudication pain  No chest pain  2  ckd-3-  The GFR is 55-49  BMP reviewed  No edema  No nausea or vomiting  The following portions of the patient's history were reviewed and updated as appropriate:   Past Medical History:  He has a past medical history of A-fib ([facility] [address] ), Arthritis, Cardiac arrhythmia, GERD (gastroesophageal reflux disease), Hyperlipidemia, Hypertension, Metabolic syndrome, Nocturia, Renal disorder, Restless legs, Seasonal allergies, Sleep apnea, and Vertigo  ,  _______________________________________________________________________  Medical Problems:  does not have any pertinent problems on file ,  _______________________________________________________________________  Past Surgical History:   has a past surgical history that includes Palate / uvula biopsy / excision; Shoulder surgery (Bilateral); Colonoscopy (01/01/2012); Shoulder surgery (Bilateral);  Colonoscopy; Cardiac electrophysiology mapping and ablation (2019); and pr knee scope,med/lat menisectomy (Right, 8/5/2021)  ,  _______________________________________________________________________  Family History:  family history includes Cancer in his brother and mother; Diabetes in his sister; Heart disease in his brother and father ,  _______________________________________________________________________  Social History:   reports that he has never smoked  He has never used smokeless tobacco  He reports current alcohol use  He reports that he does not use drugs  ,  _______________________________________________________________________  Allergies:  has No Known Allergies     _______________________________________________________________________  Current Outpatient Medications   Medication Sig Dispense Refill    aspirin (ECOTRIN LOW STRENGTH) 81 mg EC tablet Take 81 mg by mouth daily      lisinopril (ZESTRIL) 20 mg tablet Take 1 tablet (20 mg total) by mouth every morning 90 tablet 1    metoprolol tartrate (LOPRESSOR) 50 mg tablet TAKE 1 TABLET BY MOUTH EVERY 12 HOURS 180 tablet 1    omeprazole (PriLOSEC) 20 mg delayed release capsule TAKE 1 CAPSULE (20 MG TOTAL) BY MOUTH DAILY BEFORE BREAKFAST 30 capsule 5    rOPINIRole (REQUIP) 1 mg tablet Take 1 tablet (1 mg total) by mouth daily at bedtime 90 tablet 1    simvastatin (ZOCOR) 40 mg tablet TAKE 1 TABLET BY MOUTH DAILY AT BEDTIME 90 tablet 3    tamsulosin (FLOMAX) 0 4 mg TAKE 1 CAPSULE BY MOUTH EVERY DAY WITH DINNER (Patient taking differently: Take 0 4 mg by mouth daily at bedtime ) 90 capsule 1     No current facility-administered medications for this visit      _______________________________________________________________________  Review of Systems   Constitutional: Negative for chills, fatigue and fever  HENT: Negative for congestion, nosebleeds and rhinorrhea  Eyes: Negative for discharge and visual disturbance  Respiratory: Negative for cough, chest tightness, shortness of breath and wheezing      Cardiovascular: Negative for chest pain, palpitations and leg swelling  Gastrointestinal: Negative for abdominal distention, abdominal pain, constipation, diarrhea and vomiting  Endocrine: Negative for polydipsia and polyuria  Genitourinary: Negative for difficulty urinating, frequency and hematuria  Musculoskeletal: Negative for arthralgias, back pain and myalgias  Skin: Negative for rash  Allergic/Immunologic: Negative for environmental allergies  Neurological: Negative for dizziness, syncope, weakness, light-headedness and headaches  Hematological: Negative  Psychiatric/Behavioral: Negative for agitation, confusion, decreased concentration, dysphoric mood, sleep disturbance and suicidal ideas  The patient is not nervous/anxious  All other systems reviewed and are negative  Objective:  Vitals:    08/12/21 1344   BP: 120/70   BP Location: Left arm   Patient Position: Sitting   Cuff Size: Large   Pulse: 69   Resp: 16   Temp: (!) 96 8 °F (36 °C)   TempSrc: Temporal   SpO2: 98%   Weight: 129 kg (285 lb 6 4 oz)   Height: 5' 9" (1 753 m)     Body mass index is 42 15 kg/m²  Physical Exam  Vitals and nursing note reviewed  Constitutional:       General: He is not in acute distress  Appearance: Normal appearance  He is well-developed  HENT:      Head: Normocephalic and atraumatic  Mouth/Throat:      Mouth: Mucous membranes are moist    Eyes:      General:         Right eye: No discharge  Left eye: No discharge  Neck:      Thyroid: No thyromegaly  Cardiovascular:      Rate and Rhythm: Normal rate and regular rhythm  Pulses: Normal pulses  Heart sounds: Normal heart sounds  No murmur heard  Pulmonary:      Effort: Pulmonary effort is normal       Breath sounds: Normal breath sounds  No wheezing or rhonchi  Abdominal:      General: Bowel sounds are normal  There is no distension  Palpations: Abdomen is soft  Tenderness: There is no abdominal tenderness     Musculoskeletal: General: No swelling or tenderness  Cervical back: Neck supple  Right lower leg: No edema  Left lower leg: No edema  Lymphadenopathy:      Cervical: No cervical adenopathy  Skin:     General: Skin is warm  Neurological:      General: No focal deficit present  Mental Status: He is alert and oriented to person, place, and time  Psychiatric:         Mood and Affect: Mood normal          Behavior: Behavior normal          Thought Content:  Thought content normal

## 2021-08-18 ENCOUNTER — OFFICE VISIT (OUTPATIENT)
Dept: OBGYN CLINIC | Facility: CLINIC | Age: 61
End: 2021-08-18

## 2021-08-18 VITALS
SYSTOLIC BLOOD PRESSURE: 130 MMHG | DIASTOLIC BLOOD PRESSURE: 80 MMHG | BODY MASS INDEX: 42.21 KG/M2 | HEART RATE: 66 BPM | HEIGHT: 69 IN | WEIGHT: 285 LBS

## 2021-08-18 DIAGNOSIS — S83.231D COMPLEX TEAR OF MEDIAL MENISCUS OF RIGHT KNEE AS CURRENT INJURY, SUBSEQUENT ENCOUNTER: Primary | ICD-10-CM

## 2021-08-18 DIAGNOSIS — M17.11 PRIMARY OSTEOARTHRITIS OF RIGHT KNEE: ICD-10-CM

## 2021-08-18 PROCEDURE — 99024 POSTOP FOLLOW-UP VISIT: CPT | Performed by: ORTHOPAEDIC SURGERY

## 2021-08-18 PROCEDURE — 3008F BODY MASS INDEX DOCD: CPT | Performed by: INTERNAL MEDICINE

## 2021-08-18 NOTE — PROGRESS NOTES
Patient Name:  Sulema Saeed  MRN:  [id number]    Assessment & Plan     Right knee arthroscopic partial medial meniscectomy and chondroplasty 8/5/2021  1  Gradually increase activity as tolerated  2  Cane as needed for ambulation, wean as tolerated  3  May resume swimming  4  Follow up in 4 weeks  History of the Present Illness     Postop evaluation after right knee arthroscopy almost 2 weeks ago  Patient is improving  He fell 1 week after surgery while trying to kneel with severe pain after  The pain has been gradually improving since that time  He ambulates with a cane  He describes tightness after driving down and back to [facility]  General ROS:  Negative for fever or chills  Neurological ROS:  Negative for numbness or tingling      Physical Exam     /80   Pulse 66   Ht 5' 9" (1 753 m)   Wt 129 kg (285 lb)   BMI 42 09 kg/m²     Right knee:  Effusion: Trace  Tenderness: Medial aspect  Range of motion: 0-120  Varus stress: Stable  Valgus stress: Stable  CV: No LE edema  Skin: Incisions are clean dry and intact without erythema

## 2021-08-26 DIAGNOSIS — G25.81 RLS (RESTLESS LEGS SYNDROME): ICD-10-CM

## 2021-08-26 RX ORDER — ROPINIROLE 1 MG/1
1 TABLET, FILM COATED ORAL
Qty: 90 TABLET | Refills: 1 | Status: SHIPPED | OUTPATIENT
Start: 2021-08-26 | End: 2021-08-27 | Stop reason: SDUPTHER

## 2021-08-26 RX ORDER — ROPINIROLE 1 MG/1
1 TABLET, FILM COATED ORAL
Qty: 90 TABLET | Refills: 1 | Status: SHIPPED | OUTPATIENT
Start: 2021-08-26 | End: 2021-08-26 | Stop reason: SDUPTHER

## 2021-08-26 NOTE — TELEPHONE ENCOUNTER
Wife called and stated that pt is in need of the following medication to be sent to  [facility]     rOPINIRole (REQUIP) 1 mg tablet

## 2021-08-27 DIAGNOSIS — G25.81 RLS (RESTLESS LEGS SYNDROME): ICD-10-CM

## 2021-08-27 RX ORDER — ROPINIROLE 1 MG/1
1 TABLET, FILM COATED ORAL
Qty: 90 TABLET | Refills: 1 | Status: SHIPPED | OUTPATIENT
Start: 2021-08-27 | End: 2022-05-16

## 2021-09-21 ENCOUNTER — OFFICE VISIT (OUTPATIENT)
Dept: OBGYN CLINIC | Facility: CLINIC | Age: 61
End: 2021-09-21

## 2021-09-21 VITALS
BODY MASS INDEX: 42.65 KG/M2 | HEART RATE: 62 BPM | HEIGHT: 69 IN | WEIGHT: 288 LBS | SYSTOLIC BLOOD PRESSURE: 111 MMHG | DIASTOLIC BLOOD PRESSURE: 71 MMHG

## 2021-09-21 DIAGNOSIS — S83.231D COMPLEX TEAR OF MEDIAL MENISCUS OF RIGHT KNEE AS CURRENT INJURY, SUBSEQUENT ENCOUNTER: Primary | ICD-10-CM

## 2021-09-21 DIAGNOSIS — M17.11 PRIMARY OSTEOARTHRITIS OF RIGHT KNEE: ICD-10-CM

## 2021-09-21 PROCEDURE — 3008F BODY MASS INDEX DOCD: CPT | Performed by: INTERNAL MEDICINE

## 2021-09-21 PROCEDURE — 99024 POSTOP FOLLOW-UP VISIT: CPT | Performed by: ORTHOPAEDIC SURGERY

## 2021-09-21 NOTE — PROGRESS NOTES
Patient Name:  Sulema Saeed  MRN:  [id number]    [address] [place] [address]     Right knee arthroscopic partial medial meniscectomy, chondroplasty 8/5/2021  1  Gradually return to normal activities as tolerated with modifications to avoid pain  2  OTC meds and ice prn for pain relief  3  Follow up in 6 weeks  Possibly schedule arthroscopic procedure for left knee based on right knee symptoms  History of the Present Illness     Postop evaluation 6 weeks s/p arthroscopic partial medial meniscectomy, chondroplasty of the right knee  Patient reports some improvements in his symptoms since his last visit  He does note intermittent pain about the anterolateral aspect of the knee, especially when first rising from a seated position  This pain has been improving  He takes nothing for pain  Denies instability  General ROS:  Negative for fever or chills  Neurological ROS:  Negative for numbness or tingling      Physical Exam     /71   Pulse 62   Ht 5' 9" (1 753 m)   Wt 131 kg (288 lb)   BMI 42 53 kg/m²     Right knee:  Effusion: None  Tenderness: None  Range of motion: 0-120  Varus stress: Stable  Valgus stress: Stable  Claritza's test: Negative  Incisions are well healed without erythema    Scribe Attestation    I,:  Dank Dougherty am acting as a scribe while in the presence of the attending physician :       I,:  Maria R Caballero MD personally performed the services described in this documentation    as scribed in my presence :

## 2021-11-02 ENCOUNTER — OFFICE VISIT (OUTPATIENT)
Dept: OBGYN CLINIC | Facility: CLINIC | Age: 61
End: 2021-11-02

## 2021-11-02 VITALS
BODY MASS INDEX: 43.55 KG/M2 | SYSTOLIC BLOOD PRESSURE: 142 MMHG | HEART RATE: 59 BPM | WEIGHT: 294 LBS | DIASTOLIC BLOOD PRESSURE: 84 MMHG | HEIGHT: 69 IN

## 2021-11-02 DIAGNOSIS — M17.11 OSTEOARTHRITIS OF RIGHT PATELLOFEMORAL JOINT: Primary | ICD-10-CM

## 2021-11-02 PROCEDURE — 99024 POSTOP FOLLOW-UP VISIT: CPT | Performed by: ORTHOPAEDIC SURGERY

## 2021-11-16 ENCOUNTER — OFFICE VISIT (OUTPATIENT)
Dept: FAMILY MEDICINE CLINIC | Facility: CLINIC | Age: 61
End: 2021-11-16
Payer: MEDICARE

## 2021-11-16 VITALS
SYSTOLIC BLOOD PRESSURE: 110 MMHG | DIASTOLIC BLOOD PRESSURE: 70 MMHG | TEMPERATURE: 97.2 F | HEART RATE: 62 BPM | RESPIRATION RATE: 16 BRPM | OXYGEN SATURATION: 97 % | BODY MASS INDEX: 44.14 KG/M2 | HEIGHT: 69 IN | WEIGHT: 298 LBS

## 2021-11-16 DIAGNOSIS — B35.3 TINEA PEDIS OF LEFT FOOT: ICD-10-CM

## 2021-11-16 DIAGNOSIS — K21.9 GASTROESOPHAGEAL REFLUX DISEASE WITHOUT ESOPHAGITIS: ICD-10-CM

## 2021-11-16 DIAGNOSIS — I10 ESSENTIAL HYPERTENSION: Primary | ICD-10-CM

## 2021-11-16 DIAGNOSIS — N18.2 CKD (CHRONIC KIDNEY DISEASE) STAGE 2, GFR 60-89 ML/MIN: ICD-10-CM

## 2021-11-16 PROCEDURE — 99214 OFFICE O/P EST MOD 30 MIN: CPT | Performed by: INTERNAL MEDICINE

## 2021-11-16 RX ORDER — KETOCONAZOLE 20 MG/G
CREAM TOPICAL 2 TIMES DAILY
Qty: 30 G | Refills: 1 | Status: SHIPPED | OUTPATIENT
Start: 2021-11-16

## 2022-01-24 DIAGNOSIS — N40.0 BPH WITHOUT OBSTRUCTION/LOWER URINARY TRACT SYMPTOMS: ICD-10-CM

## 2022-01-24 RX ORDER — TAMSULOSIN HYDROCHLORIDE 0.4 MG/1
CAPSULE ORAL
Qty: 90 CAPSULE | Refills: 1 | Status: SHIPPED | OUTPATIENT
Start: 2022-01-24 | End: 2022-07-27

## 2022-02-07 DIAGNOSIS — I10 ESSENTIAL HYPERTENSION: ICD-10-CM

## 2022-02-08 RX ORDER — METOPROLOL TARTRATE 50 MG/1
TABLET, FILM COATED ORAL
Qty: 180 TABLET | Refills: 1 | Status: SHIPPED | OUTPATIENT
Start: 2022-02-08

## 2022-02-08 RX ORDER — LISINOPRIL 20 MG/1
TABLET ORAL
Qty: 90 TABLET | Refills: 1 | Status: SHIPPED | OUTPATIENT
Start: 2022-02-08 | End: 2022-08-05

## 2022-02-11 ENCOUNTER — APPOINTMENT (OUTPATIENT)
Dept: LAB | Facility: MEDICAL CENTER | Age: 62
End: 2022-02-11
Payer: COMMERCIAL

## 2022-02-11 DIAGNOSIS — N18.2 CKD (CHRONIC KIDNEY DISEASE) STAGE 2, GFR 60-89 ML/MIN: ICD-10-CM

## 2022-02-11 LAB
ANION GAP SERPL CALCULATED.3IONS-SCNC: 8 MMOL/L (ref 4–13)
BUN SERPL-MCNC: 18 MG/DL (ref 5–25)
CALCIUM SERPL-MCNC: 10 MG/DL (ref 8.3–10.1)
CHLORIDE SERPL-SCNC: 106 MMOL/L (ref 100–108)
CO2 SERPL-SCNC: 25 MMOL/L (ref 21–32)
CREAT SERPL-MCNC: 1.41 MG/DL (ref 0.6–1.3)
GFR SERPL CREATININE-BSD FRML MDRD: 53 ML/MIN/1.73SQ M
GLUCOSE P FAST SERPL-MCNC: 115 MG/DL (ref 65–99)
POTASSIUM SERPL-SCNC: 4.6 MMOL/L (ref 3.5–5.3)
SODIUM SERPL-SCNC: 139 MMOL/L (ref 136–145)

## 2022-02-11 PROCEDURE — 36415 COLL VENOUS BLD VENIPUNCTURE: CPT

## 2022-02-11 PROCEDURE — 80048 BASIC METABOLIC PNL TOTAL CA: CPT

## 2022-02-16 ENCOUNTER — OFFICE VISIT (OUTPATIENT)
Dept: FAMILY MEDICINE CLINIC | Facility: CLINIC | Age: 62
End: 2022-02-16
Payer: COMMERCIAL

## 2022-02-16 VITALS
BODY MASS INDEX: 45.05 KG/M2 | WEIGHT: 304.2 LBS | TEMPERATURE: 97.6 F | SYSTOLIC BLOOD PRESSURE: 130 MMHG | OXYGEN SATURATION: 98 % | DIASTOLIC BLOOD PRESSURE: 80 MMHG | HEIGHT: 69 IN | HEART RATE: 66 BPM | RESPIRATION RATE: 16 BRPM

## 2022-02-16 DIAGNOSIS — E66.01 MORBID OBESITY WITH BMI OF 40.0-44.9, ADULT (HCC): ICD-10-CM

## 2022-02-16 DIAGNOSIS — E78.2 MIXED HYPERLIPIDEMIA: ICD-10-CM

## 2022-02-16 DIAGNOSIS — I10 ESSENTIAL HYPERTENSION: ICD-10-CM

## 2022-02-16 DIAGNOSIS — Z12.5 SCREENING FOR PROSTATE CANCER: ICD-10-CM

## 2022-02-16 DIAGNOSIS — N18.31 STAGE 3A CHRONIC KIDNEY DISEASE (HCC): ICD-10-CM

## 2022-02-16 DIAGNOSIS — Z12.11 SCREEN FOR COLON CANCER: ICD-10-CM

## 2022-02-16 DIAGNOSIS — Z00.00 MEDICARE ANNUAL WELLNESS VISIT, SUBSEQUENT: Primary | ICD-10-CM

## 2022-02-16 DIAGNOSIS — F32.0 CURRENT MILD EPISODE OF MAJOR DEPRESSIVE DISORDER WITHOUT PRIOR EPISODE (HCC): ICD-10-CM

## 2022-02-16 DIAGNOSIS — R06.00 DOE (DYSPNEA ON EXERTION): ICD-10-CM

## 2022-02-16 PROBLEM — R06.09 DOE (DYSPNEA ON EXERTION): Status: ACTIVE | Noted: 2022-02-16

## 2022-02-16 PROCEDURE — 99214 OFFICE O/P EST MOD 30 MIN: CPT | Performed by: INTERNAL MEDICINE

## 2022-02-16 PROCEDURE — G0402 INITIAL PREVENTIVE EXAM: HCPCS | Performed by: INTERNAL MEDICINE

## 2022-02-16 NOTE — PROGRESS NOTES
Oscar Scott is here for his Subsequent Wellness visit  Last Medicare Wellness visit information reviewed, patient interviewed, no change since last AWV  Health Risk Assessment:   Patient rates overall health as fair  Patient feels that their physical health rating is slightly worse  Patient is satisfied with their life  Eyesight was rated as slightly worse  Hearing was rated as slightly worse  Patient feels that their emotional and mental health rating is slightly better  Patients states they are sometimes angry  Patient states they are always unusually tired/fatigued  Pain experienced in the last 7 days has been none  Patient states that he has experienced no weight loss or gain in last 6 months  Depression Screening:   PHQ-9 Score: 8      Fall Risk Screening: In the past year, patient has experienced: no history of falling in past year      Home Safety:  Patient has trouble with stairs inside or outside of their home  Patient has working smoke alarms and has no working carbon monoxide detector  Home safety hazards include: loose rugs on the floor  Nutrition:   Current diet is Regular and Frequent junk food  Medications:   Patient is currently taking over-the-counter supplements  OTC medications include: see medication list  Patient is able to manage medications  Activities of Daily Living (ADLs)/Instrumental Activities of Daily Living (IADLs):   Walk and transfer into and out of bed and chair?: Yes  Dress and groom yourself?: Yes    Bathe or shower yourself?: Yes    Feed yourself?  Yes  Do your laundry/housekeeping?: Yes  Manage your money, pay your bills and track your expenses?: Yes  Make your own meals?: Yes    Do your own shopping?: Yes    Previous Hospitalizations:   Any hospitalizations or ED visits within the last 12 months?: No      Advance Care Planning:   Living will: No    Durable POA for healthcare: No      Cognitive Screening:   Provider or family/friend/caregiver concerned regarding cognition?: No    PREVENTIVE SCREENINGS      Cardiovascular Screening:    General: History Lipid Disorder    Due for: Lipid Panel      Diabetes Screening:     General: Screening Current      Colorectal Cancer Screening:       Due for: Colonoscopy - Low Risk      Prostate Cancer Screening:      Due for: PSA      Osteoporosis Screening:    General: Screening Not Indicated      Abdominal Aortic Aneurysm (AAA) Screening:        General: Screening Not Indicated      Lung Cancer Screening:     General: Screening Not Indicated      Hepatitis C Screening:    General: Screening Current    Screening, Brief Intervention, and Referral to Treatment (SBIRT)    Screening  Typical number of drinks in a day: 0  Typical number of drinks in a week: 0  Interpretation: Low risk drinking behavior  AUDIT-C Screenin) How often did you have a drink containing alcohol in the past year? never  2) How many drinks did you have on a typical day when you were drinking in the past year? 0  3) How often did you have 6 or more drinks on one occasion in the past year? never    AUDIT-C Score: 0  Interpretation: Score 0-3 (male): Negative screen for alcohol misuse    Single Item Drug Screening:  How often have you used an illegal drug (including marijuana) or a prescription medication for non-medical reasons in the past year? never    Single Item Drug Screen Score: 0  Interpretation: Negative screen for possible drug use disorder    Brief Intervention  Alcohol & drug use screenings were reviewed  No concerns regarding substance use disorder identified  Other Counseling Topics:   Car/seat belt/driving safety, skin self-exam, sunscreen and regular weightbearing exercise and calcium and vitamin D intake

## 2022-02-16 NOTE — PROGRESS NOTES
BMI Counseling: Body mass index is 44 92 kg/m²  The BMI is above normal  Nutrition recommendations include decreasing portion sizes, decreasing fast food intake, consuming healthier snacks, limiting drinks that contain sugar, moderation in carbohydrate intake and reducing intake of cholesterol  Exercise recommendations include exercising 3-5 times per week  Rationale for BMI follow-up plan is due to patient being overweight or obese  Assessment/Plan:         Problem List Items Addressed This Visit        Cardiovascular and Mediastinum    Essential hypertension    Relevant Orders    CBC and differential    Comprehensive metabolic panel    TSH, 3rd generation    Lipid panel    Urinalysis with microscopic       Genitourinary    Stage 3a chronic kidney disease ([facility] )       Other    Current mild episode of major depressive disorder without prior episode ([facility] )    Screening for prostate cancer    Relevant Orders    PSA, Total Screen    Morbid obesity with BMI of 40 0-44 9, adult ([facility] )     Failed life style changes  Try ozempic- s/e pt         Relevant Medications    Semaglutide,0 25 or 0 5MG/DOS, 2 MG/1 5ML SOPN    Medicare annual wellness visit, subsequent - Primary    MURRAY (dyspnea on exertion)     Chronic  H/o second hand smoke exposure  Check PFT  Had normal SPECT stress test 2020  Relevant Orders    Complete PFT with post bronchodilator    Body mass index (BMI) 40 0-44 9, adult (HCC)    Mixed hyperlipidemia    Relevant Orders    TSH, 3rd generation    Lipid panel      Other Visit Diagnoses     Screen for colon cancer        Relevant Orders    Ambulatory referral for colonoscopy            Subjective:      Patient ID: Myles Jarquin is a 57 y o  male  1  htn-no headache dizziness or lightheadedness  No edema  No orthopnea  No chest pain  He does have chronic dyspnea on exertion  No tinnitus  No syncope  2  ckd-3-GFR level is stable around 53-55  No hematuria  No trouble urinating    No abdominal pain or bloating  3  MURRAY-chronic  He had normal stress test 2 years ago  No chest pain or pressure  No increased coughing or mucus or hemoptysis  He has a history of heavy secondary her smoke exposure from the smoking by other people  No anorexia or weight loss      The following portions of the patient's history were reviewed and updated as appropriate:   Past Medical History:  He has a past medical history of A-fib ([address] ), Arthritis, Cardiac arrhythmia, GERD (gastroesophageal reflux disease), Hyperlipidemia, Hypertension, Metabolic syndrome, Nocturia, Renal disorder, Restless legs, Seasonal allergies, Sleep apnea, and Vertigo  ,  _______________________________________________________________________  Medical Problems:  does not have any pertinent problems on file ,  _______________________________________________________________________  Past Surgical History:   has a past surgical history that includes Palate / uvula biopsy / excision; Shoulder surgery (Bilateral); Colonoscopy (01/01/2012); Shoulder surgery (Bilateral); Colonoscopy; Cardiac electrophysiology mapping and ablation (2019); and pr knee scope,med/lat menisectomy (Right, 8/5/2021)  ,  _______________________________________________________________________  Family History:  family history includes Cancer in his brother and mother; Diabetes in his sister; Heart disease in his brother and father ,  _______________________________________________________________________  Social History:   reports that he has never smoked  He has never used smokeless tobacco  He reports that he does not drink alcohol and does not use drugs  ,  _______________________________________________________________________  Allergies:  has No Known Allergies     _______________________________________________________________________  Current Outpatient Medications   Medication Sig Dispense Refill    aspirin (ECOTRIN LOW STRENGTH) 81 mg EC tablet Take 81 mg by mouth daily      lisinopril (ZESTRIL) 20 mg tablet TAKE 1 TABLET BY MOUTH EVERY DAY IN THE MORNING 90 tablet 1    metoprolol tartrate (LOPRESSOR) 50 mg tablet TAKE 1 TABLET BY MOUTH EVERY 12 HOURS 180 tablet 1    omeprazole (PriLOSEC) 20 mg delayed release capsule TAKE 1 CAPSULE (20 MG TOTAL) BY MOUTH DAILY BEFORE BREAKFAST 30 capsule 5    rOPINIRole (REQUIP) 1 mg tablet Take 1 tablet (1 mg total) by mouth daily at bedtime 90 tablet 1    simvastatin (ZOCOR) 40 mg tablet TAKE 1 TABLET BY MOUTH DAILY AT BEDTIME 90 tablet 3    tamsulosin (FLOMAX) 0 4 mg TAKE 1 CAPSULE BY MOUTH EVERY DAY WITH DINNER 90 capsule 1    ketoconazole (NIZORAL) 2 % cream Apply topically 2 (two) times a day (Patient not taking: Reported on 2/16/2022 ) 30 g 1    Semaglutide,0 25 or 0 5MG/DOS, 2 MG/1 5ML SOPN Inject 0 5 mg under the skin once a week 1 5 mL 12     No current facility-administered medications for this visit      _______________________________________________________________________  Review of Systems      Objective:  Vitals:    02/16/22 1010   BP: 130/80   BP Location: Left arm   Patient Position: Sitting   Cuff Size: Large   Pulse: 66   Resp: 16   Temp: 97 6 °F (36 4 °C)   TempSrc: Temporal   SpO2: 98%   Weight: (!) 138 kg (304 lb 3 2 oz)   Height: 5' 9" (1 753 m)     Body mass index is 44 92 kg/m²  Physical Exam  Vitals and nursing note reviewed  Constitutional:       General: He is not in acute distress  Appearance: Normal appearance  He is well-developed  HENT:      Head: Normocephalic and atraumatic  Eyes:      General:         Right eye: No discharge  Left eye: No discharge  Neck:      Thyroid: No thyromegaly  Cardiovascular:      Rate and Rhythm: Normal rate and regular rhythm  Pulses: Normal pulses  Heart sounds: Normal heart sounds  No murmur heard  Pulmonary:      Effort: Pulmonary effort is normal       Breath sounds: No wheezing or rhonchi     Abdominal:      General: Bowel sounds are normal  There is no distension  Palpations: Abdomen is soft  There is no mass  Tenderness: There is no abdominal tenderness  Musculoskeletal:         General: No swelling or tenderness  Cervical back: Neck supple  Right lower leg: No edema  Left lower leg: No edema  Lymphadenopathy:      Cervical: No cervical adenopathy  Skin:     General: Skin is warm  Capillary Refill: Capillary refill takes less than 2 seconds  Findings: No erythema  Neurological:      Mental Status: He is alert and oriented to person, place, and time  Psychiatric:         Mood and Affect: Mood normal          Behavior: Behavior normal          Thought Content:  Thought content normal

## 2022-02-16 NOTE — PROGRESS NOTES
Assessment/Plan:    No problem-specific Assessment & Plan notes found for this encounter  {Assess/PlanSmartLinks:17383}      Subjective:      Patient ID: Avelino Shoemaker is a 57 y o  male  The patient is a 68-year-old female with a past medical history of hypertension, dyslipidemia, GERD who presents for follow-up  1  HTN :  Blood pressure today is- ***   Tolerating medications well  No headache/dizziness/lightheadedness/blurred vision  2  Dyslipidemia :  Tolerating statin medication  No myalgia  LDL : ***   3  GERD :  On omeprazole  No trouble swallowing  No anorexia or weight loss  No hoarseness of voice  Screening and Prevention :   1        {Common ambulatory SmartLinks:83745}    Review of Systems   Constitutional: Negative for chills, diaphoresis and fever  HENT: Negative for rhinorrhea and sore throat  Respiratory: Negative for cough and shortness of breath  Cardiovascular: Negative for chest pain, palpitations and leg swelling  Gastrointestinal: Negative for abdominal pain, diarrhea, nausea and vomiting  Genitourinary: Negative for difficulty urinating  Skin: Negative for rash  Neurological: Negative for dizziness, weakness, light-headedness and numbness  Psychiatric/Behavioral: Negative for behavioral problems  Objective: There were no vitals taken for this visit  Physical Exam  Vitals reviewed  Constitutional:       General: He is not in acute distress  Appearance: Normal appearance  HENT:      Mouth/Throat:      Mouth: Mucous membranes are moist    Eyes:      General:         Right eye: No discharge  Left eye: No discharge  Pupils: Pupils are equal, round, and reactive to light  Cardiovascular:      Rate and Rhythm: Normal rate and regular rhythm  Pulses: Normal pulses  Heart sounds: Normal heart sounds  No murmur heard  Pulmonary:      Effort: Pulmonary effort is normal  No respiratory distress  Breath sounds: Normal breath sounds  No wheezing  Abdominal:      General: There is no distension  Palpations: Abdomen is soft  Tenderness: There is no abdominal tenderness  Musculoskeletal:      Cervical back: Neck supple  Right lower leg: No edema  Left lower leg: No edema  Skin:     General: Skin is warm  Capillary Refill: Capillary refill takes less than 2 seconds  Neurological:      Mental Status: He is alert  Mental status is at baseline     Psychiatric:         Mood and Affect: Mood normal

## 2022-05-11 ENCOUNTER — APPOINTMENT (OUTPATIENT)
Dept: LAB | Facility: MEDICAL CENTER | Age: 62
End: 2022-05-11
Payer: COMMERCIAL

## 2022-05-11 DIAGNOSIS — E78.2 MIXED HYPERLIPIDEMIA: ICD-10-CM

## 2022-05-11 DIAGNOSIS — Z12.5 SCREENING FOR PROSTATE CANCER: ICD-10-CM

## 2022-05-11 DIAGNOSIS — I10 ESSENTIAL HYPERTENSION: ICD-10-CM

## 2022-05-11 LAB
ALBUMIN SERPL BCP-MCNC: 3.2 G/DL (ref 3.5–5)
ALP SERPL-CCNC: 65 U/L (ref 46–116)
ALT SERPL W P-5'-P-CCNC: 40 U/L (ref 12–78)
ANION GAP SERPL CALCULATED.3IONS-SCNC: 4 MMOL/L (ref 4–13)
AST SERPL W P-5'-P-CCNC: 31 U/L (ref 5–45)
BACTERIA UR QL AUTO: ABNORMAL /HPF
BASOPHILS # BLD AUTO: 0.05 THOUSANDS/ΜL (ref 0–0.1)
BASOPHILS NFR BLD AUTO: 1 % (ref 0–1)
BILIRUB SERPL-MCNC: 0.48 MG/DL (ref 0.2–1)
BILIRUB UR QL STRIP: NEGATIVE
BUN SERPL-MCNC: 22 MG/DL (ref 5–25)
CALCIUM ALBUM COR SERPL-MCNC: 10.3 MG/DL (ref 8.3–10.1)
CALCIUM SERPL-MCNC: 9.7 MG/DL (ref 8.3–10.1)
CHLORIDE SERPL-SCNC: 107 MMOL/L (ref 100–108)
CHOLEST SERPL-MCNC: 168 MG/DL
CLARITY UR: CLEAR
CO2 SERPL-SCNC: 29 MMOL/L (ref 21–32)
COLOR UR: YELLOW
CREAT SERPL-MCNC: 1.52 MG/DL (ref 0.6–1.3)
EOSINOPHIL # BLD AUTO: 0.11 THOUSAND/ΜL (ref 0–0.61)
EOSINOPHIL NFR BLD AUTO: 2 % (ref 0–6)
ERYTHROCYTE [DISTWIDTH] IN BLOOD BY AUTOMATED COUNT: 15.6 % (ref 11.6–15.1)
GFR SERPL CREATININE-BSD FRML MDRD: 48 ML/MIN/1.73SQ M
GLUCOSE P FAST SERPL-MCNC: 121 MG/DL (ref 65–99)
GLUCOSE UR STRIP-MCNC: NEGATIVE MG/DL
HCT VFR BLD AUTO: 50.9 % (ref 36.5–49.3)
HDLC SERPL-MCNC: 41 MG/DL
HGB BLD-MCNC: 15.8 G/DL (ref 12–17)
HGB UR QL STRIP.AUTO: NEGATIVE
IMM GRANULOCYTES # BLD AUTO: 0.01 THOUSAND/UL (ref 0–0.2)
IMM GRANULOCYTES NFR BLD AUTO: 0 % (ref 0–2)
KETONES UR STRIP-MCNC: NEGATIVE MG/DL
LDLC SERPL CALC-MCNC: 96 MG/DL (ref 0–100)
LEUKOCYTE ESTERASE UR QL STRIP: NEGATIVE
LYMPHOCYTES # BLD AUTO: 2.54 THOUSANDS/ΜL (ref 0.6–4.47)
LYMPHOCYTES NFR BLD AUTO: 44 % (ref 14–44)
MCH RBC QN AUTO: 28.9 PG (ref 26.8–34.3)
MCHC RBC AUTO-ENTMCNC: 31 G/DL (ref 31.4–37.4)
MCV RBC AUTO: 93 FL (ref 82–98)
MONOCYTES # BLD AUTO: 0.41 THOUSAND/ΜL (ref 0.17–1.22)
MONOCYTES NFR BLD AUTO: 7 % (ref 4–12)
MUCOUS THREADS UR QL AUTO: ABNORMAL
NEUTROPHILS # BLD AUTO: 2.62 THOUSANDS/ΜL (ref 1.85–7.62)
NEUTS SEG NFR BLD AUTO: 46 % (ref 43–75)
NITRITE UR QL STRIP: NEGATIVE
NON-SQ EPI CELLS URNS QL MICRO: ABNORMAL /HPF
NONHDLC SERPL-MCNC: 127 MG/DL
NRBC BLD AUTO-RTO: 0 /100 WBCS
PH UR STRIP.AUTO: 5.5 [PH]
PLATELET # BLD AUTO: 224 THOUSANDS/UL (ref 149–390)
PMV BLD AUTO: 9.3 FL (ref 8.9–12.7)
POTASSIUM SERPL-SCNC: 4.1 MMOL/L (ref 3.5–5.3)
PROT SERPL-MCNC: 8.2 G/DL (ref 6.4–8.2)
PROT UR STRIP-MCNC: NEGATIVE MG/DL
PSA SERPL-MCNC: 1.2 NG/ML (ref 0–4)
RBC # BLD AUTO: 5.47 MILLION/UL (ref 3.88–5.62)
RBC #/AREA URNS AUTO: ABNORMAL /HPF
SODIUM SERPL-SCNC: 140 MMOL/L (ref 136–145)
SP GR UR STRIP.AUTO: 1.03 (ref 1–1.03)
TRIGL SERPL-MCNC: 153 MG/DL
TSH SERPL DL<=0.05 MIU/L-ACNC: 1.2 UIU/ML (ref 0.45–4.5)
UROBILINOGEN UR STRIP-ACNC: 2 MG/DL
WBC # BLD AUTO: 5.74 THOUSAND/UL (ref 4.31–10.16)
WBC #/AREA URNS AUTO: ABNORMAL /HPF

## 2022-05-11 PROCEDURE — G0103 PSA SCREENING: HCPCS

## 2022-05-11 PROCEDURE — 80053 COMPREHEN METABOLIC PANEL: CPT

## 2022-05-11 PROCEDURE — 36415 COLL VENOUS BLD VENIPUNCTURE: CPT

## 2022-05-11 PROCEDURE — 81001 URINALYSIS AUTO W/SCOPE: CPT | Performed by: INTERNAL MEDICINE

## 2022-05-11 PROCEDURE — 85025 COMPLETE CBC W/AUTO DIFF WBC: CPT

## 2022-05-11 PROCEDURE — 84443 ASSAY THYROID STIM HORMONE: CPT

## 2022-05-11 PROCEDURE — 80061 LIPID PANEL: CPT

## 2022-05-16 DIAGNOSIS — G25.81 RLS (RESTLESS LEGS SYNDROME): ICD-10-CM

## 2022-05-16 RX ORDER — ROPINIROLE 1 MG/1
TABLET, FILM COATED ORAL
Qty: 90 TABLET | Refills: 1 | Status: SHIPPED | OUTPATIENT
Start: 2022-05-16

## 2022-05-17 ENCOUNTER — OFFICE VISIT (OUTPATIENT)
Dept: FAMILY MEDICINE CLINIC | Facility: CLINIC | Age: 62
End: 2022-05-17
Payer: COMMERCIAL

## 2022-05-17 VITALS
HEIGHT: 69 IN | WEIGHT: 303.2 LBS | DIASTOLIC BLOOD PRESSURE: 80 MMHG | TEMPERATURE: 97 F | SYSTOLIC BLOOD PRESSURE: 130 MMHG | RESPIRATION RATE: 16 BRPM | OXYGEN SATURATION: 97 % | HEART RATE: 68 BPM | BODY MASS INDEX: 44.91 KG/M2

## 2022-05-17 DIAGNOSIS — E66.01 MORBID OBESITY WITH BMI OF 40.0-44.9, ADULT (HCC): ICD-10-CM

## 2022-05-17 DIAGNOSIS — N18.31 STAGE 3A CHRONIC KIDNEY DISEASE (HCC): ICD-10-CM

## 2022-05-17 DIAGNOSIS — M62.830 SPASM OF PARASPINAL MUSCLE: ICD-10-CM

## 2022-05-17 DIAGNOSIS — I10 ESSENTIAL HYPERTENSION: Primary | ICD-10-CM

## 2022-05-17 DIAGNOSIS — R73.03 PRE-DIABETES: ICD-10-CM

## 2022-05-17 PROCEDURE — 99214 OFFICE O/P EST MOD 30 MIN: CPT | Performed by: INTERNAL MEDICINE

## 2022-05-17 RX ORDER — CYCLOBENZAPRINE HCL 5 MG
5 TABLET ORAL 2 TIMES DAILY PRN
Qty: 60 TABLET | Refills: 3 | Status: SHIPPED | OUTPATIENT
Start: 2022-05-17

## 2022-05-17 NOTE — ASSESSMENT & PLAN NOTE
Right-sided mid paraspinal muscle spasm  Chronic on and off  Start cyclobenzaprine 5 mg twice a day  He took it before without any significant side effects

## 2022-05-17 NOTE — PROGRESS NOTES
Assessment/Plan:         Problem List Items Addressed This Visit        Cardiovascular and Mediastinum    Essential hypertension - Primary     Controlled  Genitourinary    Stage 3a chronic kidney disease ([facility] [address] )     Lab Results   Component Value Date    EGFR 48 05/11/2022    EGFR 53 02/11/2022    EGFR 55 08/03/2021    CREATININE 1 52 (H) 05/11/2022    CREATININE 1 41 (H) 02/11/2022    CREATININE 1 37 (H) 08/03/2021   bmp reviewed and interpreted  Recheck BMP before next visit  Check kidney ultrasound           Relevant Orders    Basic metabolic panel    US kidney and bladder       Other    Morbid obesity with BMI of 40 0-44 9, adult (HCC)     Up ozempic to 1 mg weekly           Relevant Medications    semaglutide, 1 mg/dose, (Ozempic) 4 MG/3ML SOPN injection pen    Pre-diabetes    Spasm of paraspinal muscle     Right-sided mid paraspinal muscle spasm  Chronic on and off  Start cyclobenzaprine 5 mg twice a day  He took it before without any significant side effects  Relevant Medications    cyclobenzaprine (FLEXERIL) 5 mg tablet            Subjective:      Patient ID: Enrique Snow is a 57 y o  male  1  htn- no headache dizziness or lightheadedness  No chest pain or dyspnea  No edema  No orthopnea  No syncope or palpitations  No cough  No increases fatigue  2  ckd-3- GFR around 48-50  No trouble urinating  No hematuria  No abdominal pain  3  Back pain- has been experiencing mid back pain around paraspinal muscle area on the right side  Gets worse during workouts  No lower extremity numbness or weakness  No incontinence  4  Obesity- Ozempic 0 5 mg tolerating well  He has been noticing some effect on the appetite  However he did not lose much weight so far  We will up the dose        The following portions of the patient's history were reviewed and updated as appropriate:   Past Medical History:  He has a past medical history of A-fib ([facility] ), Arthritis, Cardiac arrhythmia, GERD (gastroesophageal reflux disease), Hyperlipidemia, Hypertension, Metabolic syndrome, Nocturia, Renal disorder, Restless legs, Seasonal allergies, Sleep apnea, and Vertigo  ,  _______________________________________________________________________  Medical Problems:  does not have any pertinent problems on file ,  _______________________________________________________________________  Past Surgical History:   has a past surgical history that includes Palate / uvula biopsy / excision; Shoulder surgery (Bilateral); Colonoscopy (01/01/2012); Shoulder surgery (Bilateral); Colonoscopy; Cardiac electrophysiology mapping and ablation (2019); and pr knee scope,med/lat menisectomy (Right, 8/5/2021)  ,  _______________________________________________________________________  Family History:  family history includes Cancer in his brother and mother; Diabetes in his sister; Heart disease in his brother and father ,  _______________________________________________________________________  Social History:   reports that he has never smoked  He has never used smokeless tobacco  He reports that he does not drink alcohol and does not use drugs  ,  _______________________________________________________________________  Allergies:  has No Known Allergies     _______________________________________________________________________  Current Outpatient Medications   Medication Sig Dispense Refill    aspirin (ECOTRIN LOW STRENGTH) 81 mg EC tablet Take 81 mg by mouth daily      cyclobenzaprine (FLEXERIL) 5 mg tablet Take 1 tablet (5 mg total) by mouth as needed in the morning and 1 tablet (5 mg total) as needed in the evening for muscle spasms   60 tablet 3    lisinopril (ZESTRIL) 20 mg tablet TAKE 1 TABLET BY MOUTH EVERY DAY IN THE MORNING 90 tablet 1    metoprolol tartrate (LOPRESSOR) 50 mg tablet TAKE 1 TABLET BY MOUTH EVERY 12 HOURS 180 tablet 1    omeprazole (PriLOSEC) 20 mg delayed release capsule TAKE 1 CAPSULE (20 MG TOTAL) BY MOUTH DAILY BEFORE BREAKFAST 30 capsule 5    rOPINIRole (REQUIP) 1 mg tablet TAKE 1 TABLET BY MOUTH DAILY AT BEDTIME 90 tablet 1    semaglutide, 1 mg/dose, (Ozempic) 4 MG/3ML SOPN injection pen Inject 0 75 mL (1 mg total) under the skin once a week 3 mL 5    simvastatin (ZOCOR) 40 mg tablet TAKE 1 TABLET BY MOUTH DAILY AT BEDTIME 90 tablet 3    tamsulosin (FLOMAX) 0 4 mg TAKE 1 CAPSULE BY MOUTH EVERY DAY WITH DINNER 90 capsule 1    ketoconazole (NIZORAL) 2 % cream Apply topically 2 (two) times a day (Patient not taking: Reported on 2/16/2022 ) 30 g 1     No current facility-administered medications for this visit      _______________________________________________________________________  Review of Systems   Constitutional: Negative for chills, fatigue and fever  HENT: Negative for congestion, nosebleeds and rhinorrhea  Eyes: Negative for discharge and visual disturbance  Respiratory: Negative for cough, chest tightness, shortness of breath and wheezing  Cardiovascular: Negative for chest pain  Gastrointestinal: Negative for abdominal distention, abdominal pain, constipation, diarrhea and vomiting  Endocrine: Negative for polydipsia and polyuria  Genitourinary: Negative for difficulty urinating, frequency and hematuria  Musculoskeletal: Positive for back pain  Negative for arthralgias and myalgias  Skin: Negative for rash  Allergic/Immunologic: Negative for environmental allergies  Neurological: Negative for dizziness, syncope, weakness, light-headedness and headaches  Hematological: Negative  Psychiatric/Behavioral: Negative for agitation, confusion, decreased concentration, dysphoric mood and suicidal ideas  The patient is not nervous/anxious  All other systems reviewed and are negative          Objective:  Vitals:    05/17/22 1011   BP: 130/80   BP Location: Left arm   Patient Position: Sitting   Cuff Size: Large   Pulse: 68   Resp: 16   Temp: (!) 97 °F (36 1 °C) TempSrc: Temporal   SpO2: 97%   Weight: (!) 138 kg (303 lb 3 2 oz)   Height: 5' 9" (1 753 m)     Body mass index is 44 77 kg/m²  Physical Exam  Vitals and nursing note reviewed  Constitutional:       General: He is not in acute distress  Appearance: Normal appearance  He is well-developed  HENT:      Head: Normocephalic and atraumatic  Eyes:      General:         Right eye: No discharge  Left eye: No discharge  Neck:      Thyroid: No thyromegaly  Cardiovascular:      Rate and Rhythm: Normal rate and regular rhythm  Pulses: Normal pulses  Heart sounds: Normal heart sounds  No murmur heard  Pulmonary:      Effort: Pulmonary effort is normal       Breath sounds: Normal breath sounds  No wheezing or rhonchi  Abdominal:      Tenderness: There is no abdominal tenderness  There is no right CVA tenderness  Musculoskeletal:         General: Tenderness (Right mid paraspinal area ) present  No swelling  Cervical back: Neck supple  Right lower leg: No edema  Left lower leg: No edema  Lymphadenopathy:      Cervical: No cervical adenopathy  Skin:     General: Skin is warm  Capillary Refill: Capillary refill takes less than 2 seconds  Findings: No erythema  Neurological:      Mental Status: He is alert and oriented to person, place, and time  Psychiatric:         Mood and Affect: Mood normal          Behavior: Behavior normal          Thought Content:  Thought content normal

## 2022-05-17 NOTE — ASSESSMENT & PLAN NOTE
Lab Results   Component Value Date    EGFR 48 05/11/2022    EGFR 53 02/11/2022    EGFR 55 08/03/2021    CREATININE 1 52 (H) 05/11/2022    CREATININE 1 41 (H) 02/11/2022    CREATININE 1 37 (H) 08/03/2021   bmp reviewed and interpreted  Recheck BMP before next visit    Check kidney ultrasound

## 2022-07-25 DIAGNOSIS — N40.0 BPH WITHOUT OBSTRUCTION/LOWER URINARY TRACT SYMPTOMS: ICD-10-CM

## 2022-07-27 RX ORDER — TAMSULOSIN HYDROCHLORIDE 0.4 MG/1
CAPSULE ORAL
Qty: 90 CAPSULE | Refills: 1 | Status: SHIPPED | OUTPATIENT
Start: 2022-07-27

## 2022-08-04 DIAGNOSIS — I10 ESSENTIAL HYPERTENSION: ICD-10-CM

## 2022-08-05 RX ORDER — LISINOPRIL 20 MG/1
TABLET ORAL
Qty: 90 TABLET | Refills: 1 | Status: SHIPPED | OUTPATIENT
Start: 2022-08-05

## 2022-08-10 ENCOUNTER — VBI (OUTPATIENT)
Dept: ADMINISTRATIVE | Facility: OTHER | Age: 62
End: 2022-08-10

## 2022-08-12 DIAGNOSIS — I10 ESSENTIAL HYPERTENSION: ICD-10-CM

## 2022-08-12 NOTE — ASSESSMENT & PLAN NOTE
Lab Results   Component Value Date    EGFR 49 03/16/2021    EGFR 49 02/12/2021    CREATININE 1 51 (H) 03/16/2021    CREATININE 1 52 (H) 02/12/2021   gfr 49-53  Recheck bmp  stoped mobic  On lisinopril  For information on Fall & Injury Prevention, visit: [email or web address] OR  [email or web address] OR  https://www.cdc.gov/steadi/patient.html

## 2022-08-15 ENCOUNTER — APPOINTMENT (OUTPATIENT)
Dept: LAB | Facility: MEDICAL CENTER | Age: 62
End: 2022-08-15
Payer: COMMERCIAL

## 2022-08-15 DIAGNOSIS — N18.31 STAGE 3A CHRONIC KIDNEY DISEASE (HCC): ICD-10-CM

## 2022-08-15 LAB
ANION GAP SERPL CALCULATED.3IONS-SCNC: 4 MMOL/L (ref 4–13)
BUN SERPL-MCNC: 15 MG/DL (ref 5–25)
CALCIUM SERPL-MCNC: 9.3 MG/DL (ref 8.3–10.1)
CHLORIDE SERPL-SCNC: 106 MMOL/L (ref 96–108)
CO2 SERPL-SCNC: 28 MMOL/L (ref 21–32)
CREAT SERPL-MCNC: 1.56 MG/DL (ref 0.6–1.3)
GFR SERPL CREATININE-BSD FRML MDRD: 46 ML/MIN/1.73SQ M
GLUCOSE P FAST SERPL-MCNC: 115 MG/DL (ref 65–99)
POTASSIUM SERPL-SCNC: 4.6 MMOL/L (ref 3.5–5.3)
SODIUM SERPL-SCNC: 138 MMOL/L (ref 135–147)

## 2022-08-15 PROCEDURE — 36415 COLL VENOUS BLD VENIPUNCTURE: CPT

## 2022-08-15 PROCEDURE — 80048 BASIC METABOLIC PNL TOTAL CA: CPT

## 2022-08-16 RX ORDER — METOPROLOL TARTRATE 50 MG/1
TABLET, FILM COATED ORAL
Qty: 180 TABLET | Refills: 1 | Status: SHIPPED | OUTPATIENT
Start: 2022-08-16

## 2022-08-17 ENCOUNTER — OFFICE VISIT (OUTPATIENT)
Dept: FAMILY MEDICINE CLINIC | Facility: CLINIC | Age: 62
End: 2022-08-17
Payer: COMMERCIAL

## 2022-08-17 VITALS
SYSTOLIC BLOOD PRESSURE: 110 MMHG | BODY MASS INDEX: 43.96 KG/M2 | HEART RATE: 62 BPM | OXYGEN SATURATION: 98 % | TEMPERATURE: 98.6 F | RESPIRATION RATE: 16 BRPM | DIASTOLIC BLOOD PRESSURE: 70 MMHG | WEIGHT: 296.8 LBS | HEIGHT: 69 IN

## 2022-08-17 DIAGNOSIS — M17.0 PRIMARY OSTEOARTHRITIS OF BOTH KNEES: ICD-10-CM

## 2022-08-17 DIAGNOSIS — H60.391 OTHER INFECTIVE ACUTE OTITIS EXTERNA OF RIGHT EAR: ICD-10-CM

## 2022-08-17 DIAGNOSIS — E78.2 MIXED HYPERLIPIDEMIA: ICD-10-CM

## 2022-08-17 DIAGNOSIS — E66.01 MORBID OBESITY WITH BMI OF 40.0-44.9, ADULT (HCC): ICD-10-CM

## 2022-08-17 DIAGNOSIS — N18.31 STAGE 3A CHRONIC KIDNEY DISEASE (HCC): Primary | ICD-10-CM

## 2022-08-17 DIAGNOSIS — K21.00 GASTROESOPHAGEAL REFLUX DISEASE WITH ESOPHAGITIS WITHOUT HEMORRHAGE: ICD-10-CM

## 2022-08-17 PROBLEM — H92.01 RIGHT EAR PAIN: Status: ACTIVE | Noted: 2022-08-17

## 2022-08-17 PROCEDURE — 99214 OFFICE O/P EST MOD 30 MIN: CPT

## 2022-08-17 RX ORDER — CEPHALEXIN 500 MG/1
500 CAPSULE ORAL EVERY 6 HOURS SCHEDULED
Qty: 28 CAPSULE | Refills: 0 | Status: SHIPPED | OUTPATIENT
Start: 2022-08-17 | End: 2022-08-24

## 2022-08-17 RX ORDER — SEMAGLUTIDE 1.34 MG/ML
0.5 INJECTION, SOLUTION SUBCUTANEOUS WEEKLY
Qty: 1.5 ML | Refills: 5 | Status: SHIPPED | OUTPATIENT
Start: 2022-08-17 | End: 2022-08-22

## 2022-08-17 NOTE — PROGRESS NOTES
Assessment/Plan:    Right ear pain  1 week right ear pain with decreased hearing, exterior swelling  No drainage, fever, vomiting, chills, recent infections  Edema/erythema in external ear canal on otoscopic exam with pain  Had ear infections 6 months ago that was treated with abx    Plan  Keflex 500 mg q6 for 7 days      Pre-diabetes  Has not taken Ozempic past month due to increase in price  Notes he did lose 5 lbs while he was taking it  Plan  Ozempic 0 5 mg 1x per week injection    Screening for prostate cancer  Urinary urgency without complete evacuation of bladder  Gets up 4x times per night to go  Stage 3a chronic kidney disease [facility])  Lab Results   Component Value Date    EGFR 46 08/15/2022    EGFR 48 05/11/2022    EGFR 53 02/11/2022    CREATININE 1 56 (H) 08/15/2022    CREATININE 1 52 (H) 05/11/2022    CREATININE 1 41 (H) 02/11/2022   Complete kidney ultrasound  Continue adequate hydration  Bmp interpreted  GFR stable  Gastroesophageal reflux disease  Stable with omeprazole      Mixed hyperlipidemia  Continue Zocor       Diagnoses and all orders for this visit:    Stage 3a chronic kidney disease ([address] )    Mixed hyperlipidemia    Gastroesophageal reflux disease with esophagitis without hemorrhage    Primary osteoarthritis of both knees  -     Cancel: Ambulatory Referral to Orthopedic Surgery; Future  -     Ambulatory Referral to Orthopedic Surgery; Future    Morbid obesity with BMI of 40 0-44 9, adult (HCC)  -     Semaglutide,0 25 or 0 5MG/DOS, (Ozempic, 0 25 or 0 5 MG/DOSE,) 2 MG/1 5ML SOPN; Inject 0 5 mg under the skin once a week    Other infective acute otitis externa of right ear  -     cephalexin (KEFLEX) 500 mg capsule; Take 1 capsule (500 mg total) by mouth every 6 (six) hours for 7 days          Subjective:      Patient ID: Reggie Frank is a 60 y o  male  Presenting with cc right ear pain for 1 week with hearing loss   No fever, chills, drainage, dizziness, lightheadedness, recent infections, or contact with someone that had infection  Endorses intermittent 8/10 b/l knee pain that lasts less than an hour  Precipitated by leg rotation  Had b/l knee MRI that showed complex tear in b/l menisci  No locking, clicking, instability of joint  No acute distress  Tolerating medications well  The following portions of the patient's history were reviewed and updated as appropriate: allergies, current medications, past family history, past medical history, past social history, past surgical history and problem list     Review of Systems   Constitutional: Positive for fatigue  Negative for chills, diaphoresis and fever  HENT: Positive for ear pain, hearing loss (right ear) and postnasal drip  Negative for ear discharge, tinnitus and trouble swallowing  Respiratory: Positive for cough  Negative for chest tightness and shortness of breath  Cardiovascular: Negative for chest pain and palpitations  Genitourinary: Negative for hematuria  Musculoskeletal: Positive for arthralgias (b/l knees and right shoulder)  Neurological: Negative for dizziness, facial asymmetry, speech difficulty, light-headedness and headaches  Objective:      /70 (BP Location: Left arm, Patient Position: Sitting, Cuff Size: Large)   Pulse 62   Temp 98 6 °F (37 °C) (Temporal)   Resp 16   Ht 5' 9" (1 753 m)   Wt 135 kg (296 lb 12 8 oz)   SpO2 98%   BMI 43 83 kg/m²          Physical Exam  Vitals reviewed  Constitutional:       Appearance: Normal appearance  He is obese  HENT:      Ears:      Comments: Right external ear canal erythema and edema tender to touch of otoscope  Eyes:      Pupils: Pupils are equal, round, and reactive to light  Cardiovascular:      Rate and Rhythm: Normal rate and regular rhythm  Pulses: Normal pulses  Heart sounds: Normal heart sounds  Pulmonary:      Effort: Pulmonary effort is normal  No respiratory distress        Breath sounds: Normal breath sounds  Abdominal:      Palpations: Abdomen is soft  Musculoskeletal:      Cervical back: Normal range of motion and neck supple  Skin:     General: Skin is warm  Capillary Refill: Capillary refill takes less than 2 seconds  Neurological:      General: No focal deficit present  Mental Status: He is alert and oriented to person, place, and time  Psychiatric:         Mood and Affect: Mood normal          Behavior: Behavior normal          Thought Content:  Thought content normal          Judgment: Judgment normal

## 2022-08-17 NOTE — ASSESSMENT & PLAN NOTE
Lab Results   Component Value Date    EGFR 46 08/15/2022    EGFR 48 05/11/2022    EGFR 53 02/11/2022    CREATININE 1 56 (H) 08/15/2022    CREATININE 1 52 (H) 05/11/2022    CREATININE 1 41 (H) 02/11/2022   Complete kidney ultrasound  Continue adequate hydration  Bmp interpreted  GFR stable

## 2022-08-17 NOTE — ASSESSMENT & PLAN NOTE
Urinary urgency without complete evacuation of bladder  No dysuria, hematuria, pain  Gets up 4x times per night to go

## 2022-08-17 NOTE — ASSESSMENT & PLAN NOTE
1 week right ear pain with decreased hearing, exterior swelling  No drainage, fever, vomiting, chills, recent infections  Edema/erythema in external ear canal on otoscopic exam with pain     Had ear infections 6 months ago that was treated with abx    Plan  Keflex 500 mg q6 for 7 days

## 2022-08-17 NOTE — ASSESSMENT & PLAN NOTE
Has not taken Ozempic past month due to increase in price  Notes he did lose 5 lbs while he was taking it       Plan  Ozempic 0 5 mg 1x per week injection

## 2022-09-05 DIAGNOSIS — E66.01 MORBID OBESITY WITH BMI OF 40.0-44.9, ADULT (HCC): ICD-10-CM

## 2022-09-06 RX ORDER — SEMAGLUTIDE 1.34 MG/ML
0.5 INJECTION, SOLUTION SUBCUTANEOUS WEEKLY
Qty: 4.5 ML | Refills: 1 | Status: SHIPPED | OUTPATIENT
Start: 2022-09-06

## 2022-09-30 ENCOUNTER — VBI (OUTPATIENT)
Dept: ADMINISTRATIVE | Facility: OTHER | Age: 62
End: 2022-09-30

## 2022-09-30 NOTE — TELEPHONE ENCOUNTER
09/30/22 9:59 AM     See documentation in the [facility] [employer] [facility]

## 2022-10-07 DIAGNOSIS — B35.3 TINEA PEDIS OF LEFT FOOT: ICD-10-CM

## 2022-10-07 DIAGNOSIS — G25.81 RLS (RESTLESS LEGS SYNDROME): ICD-10-CM

## 2022-10-07 RX ORDER — ROPINIROLE 1 MG/1
TABLET, FILM COATED ORAL
Qty: 90 TABLET | Refills: 1 | Status: SHIPPED | OUTPATIENT
Start: 2022-10-07

## 2022-10-07 RX ORDER — KETOCONAZOLE 20 MG/G
CREAM TOPICAL
Qty: 30 G | Refills: 1 | Status: SHIPPED | OUTPATIENT
Start: 2022-10-07

## 2022-10-11 PROBLEM — Z00.00 MEDICARE ANNUAL WELLNESS VISIT, SUBSEQUENT: Status: RESOLVED | Noted: 2022-02-16 | Resolved: 2022-10-11

## 2022-10-12 PROBLEM — Z12.5 SCREENING FOR PROSTATE CANCER: Status: RESOLVED | Noted: 2021-02-11 | Resolved: 2022-10-12

## 2022-10-17 DIAGNOSIS — E78.2 MIXED HYPERLIPIDEMIA: ICD-10-CM

## 2022-10-17 RX ORDER — SIMVASTATIN 40 MG
40 TABLET ORAL
Qty: 90 TABLET | Refills: 3 | Status: SHIPPED | OUTPATIENT
Start: 2022-10-17

## 2022-11-04 ENCOUNTER — APPOINTMENT (OUTPATIENT)
Dept: RADIOLOGY | Facility: AMBULARY SURGERY CENTER | Age: 62
End: 2022-11-04
Attending: ORTHOPAEDIC SURGERY

## 2022-11-04 ENCOUNTER — OFFICE VISIT (OUTPATIENT)
Dept: OBGYN CLINIC | Facility: CLINIC | Age: 62
End: 2022-11-04

## 2022-11-04 VITALS — HEIGHT: 69 IN | BODY MASS INDEX: 45.18 KG/M2 | WEIGHT: 305 LBS

## 2022-11-04 DIAGNOSIS — M17.0 BILATERAL PRIMARY OSTEOARTHRITIS OF KNEE: Primary | ICD-10-CM

## 2022-11-04 DIAGNOSIS — M22.2X1 PATELLOFEMORAL SYNDROME OF BOTH KNEES: ICD-10-CM

## 2022-11-04 DIAGNOSIS — M25.562 PAIN IN BOTH KNEES, UNSPECIFIED CHRONICITY: ICD-10-CM

## 2022-11-04 DIAGNOSIS — M25.561 PAIN IN BOTH KNEES, UNSPECIFIED CHRONICITY: ICD-10-CM

## 2022-11-04 DIAGNOSIS — M17.10 PATELLOFEMORAL ARTHRITIS: ICD-10-CM

## 2022-11-04 DIAGNOSIS — M22.2X2 PATELLOFEMORAL SYNDROME OF BOTH KNEES: ICD-10-CM

## 2022-11-04 NOTE — PATIENT INSTRUCTIONS
STRENGTHENING:   Quadriceps:   Isometric Quad sets                        Progression for Quadriceps/VMO:  Hold at 45 degree angle (Picture #1)    Key: Slow & Intentional  Two ways to perform:  Start with 10 reps on each side maintaining neutral pelvis  *   Hold position for a 3-5 count  When form and exercise because less challenging; increase the REPITITIONS or DURATION your holding  Perform Wall Squats below: 3 Sets of 10 Reps         Hold for 3-5 count          Progression for Quadriceps/VMO:  Use a ball hold between knees to activate VMO  Key: Slow & Intentional  Two ways to perform:  Start with 10 reps on each side maintaining neutral pelvis  *   Hold position for a 3-5 count  When form and exercise because less challenging; increase the REPITITIONS or DURATION your holding  Perform the Wall Squat w/ball: 3 sets 10 reps        Hold for 3-5 count                        Progression:   Adding a Theraband for resistance while maintaining good form during a wall squat is more challenging     Perform Wall squat w/Theraband:   3 sets 10 reps  Hold for a 3-5 count

## 2022-11-04 NOTE — PROGRESS NOTES
Assessment:  1  Bilateral primary osteoarthritis of knee     2  Pain in both knees, unspecified chronicity  XR knee 3 vw left non injury    XR knee 3 vw right non injury   3  Patellofemoral syndrome of both knees     4  Patellofemoral arthritis         Plan:    • Patient has bilateral knee mild moderate osteoarthritis, patellofemoral syndrome and bilateral patellofemoral arthritis   • Weight bearing as tolerated bilateral lower extremity   • Patient declined PT order (doesn't like going to physical therapy)   • Provided patient with VMO strengthening exercises   • Follow up PRN         The above stated was discussed in layman's terms and the patient expressed understanding  All questions were answered to the patient's satisfaction  Subjective:   Audrey Colby is a 60 y o  male who presents today for bilateral knee pain  He was referred by his PCP Dr Ghassan Santacruz  He has history of a right knee arthroscopy partial medial meniscectomy and chondroplasty on 08/05/2021 by Dr Miranda Qiu  He states he never had relief from the right knee surgery  He is complaining of bilateral knee pain over the anterior, medial and lateral   It does state his knees buckle at times  Pain is worse with transitional positions, walking and going up and down steps  He is currently not taking any pain medications      Review of systems negative unless otherwise specified in HPI    Past Medical History:   Diagnosis Date   • A-fib [facility])    • Arthritis    • Cardiac arrhythmia     afib s/p ablasion    • GERD (gastroesophageal reflux disease)    • Hyperlipidemia     ldl 123 from 179 from 138, -  zocor    • Hypertension    • Metabolic syndrome     history of metabolic syndrome with insulin resistance  Diet and lifestyle modification, weight reduction, daily exercise  Patient education regarding metabolic syndrome, insulin resistant, dyslipidemia      • Nocturia    • Renal disorder     chronic kidney disease stage 2   • Restless legs    • Seasonal allergies    • Sleep apnea     no cpap   • Vertigo        Past Surgical History:   Procedure Laterality Date   • CARDIAC ELECTROPHYSIOLOGY MAPPING AND ABLATION  2019   • COLONOSCOPY  01/01/2012    no polyps   • COLONOSCOPY     • PALATE / UVULA BIOPSY / EXCISION     • DE KNEE SCOPE,MED/LAT MENISECTOMY Right 8/5/2021    Procedure: KNEE ARTHROSCOPIC PARTIAL MEDIAL MENISCECTOMY, CHONDROPLASTY;  Surgeon: Quin Owusu MD;  Location: [facility] MAIN OR;  Service: Orthopedics   • SHOULDER SURGERY Bilateral    • SHOULDER SURGERY Bilateral     s/p 4 surgery - 2 on each side  Family History   Problem Relation Age of Onset   • Cancer Mother    • Heart disease Father    • Diabetes Sister    • Heart disease Brother    • Cancer Brother        Social History     Occupational History   • Not on file   Tobacco Use   • Smoking status: Never Smoker   • Smokeless tobacco: Never Used   Vaping Use   • Vaping Use: Never used   Substance and Sexual Activity   • Alcohol use: Never   • Drug use: Never   • Sexual activity: Not on file         Current Outpatient Medications:   •  aspirin (ECOTRIN LOW STRENGTH) 81 mg EC tablet, Take 81 mg by mouth daily, Disp: , Rfl:   •  cyclobenzaprine (FLEXERIL) 5 mg tablet, Take 1 tablet (5 mg total) by mouth as needed in the morning and 1 tablet (5 mg total) as needed in the evening for muscle spasms  , Disp: 60 tablet, Rfl: 3  •  ketoconazole (NIZORAL) 2 % cream, APPLY TO AFFECTED AREA TWICE A DAY, Disp: 30 g, Rfl: 1  •  lisinopril (ZESTRIL) 20 mg tablet, TAKE 1 TABLET BY MOUTH EVERY DAY IN THE MORNING, Disp: 90 tablet, Rfl: 1  •  metoprolol tartrate (LOPRESSOR) 50 mg tablet, TAKE 1 TABLET BY MOUTH EVERY 12 HOURS, Disp: 180 tablet, Rfl: 1  •  omeprazole (PriLOSEC) 20 mg delayed release capsule, TAKE 1 CAPSULE (20 MG TOTAL) BY MOUTH DAILY BEFORE BREAKFAST, Disp: 30 capsule, Rfl: 5  •  Ozempic, 0 25 or 0 5 MG/DOSE, 2 MG/1 5ML SOPN, INJECT 0 5 MG UNDER THE SKIN ONCE A WEEK, Disp: 4 5 mL, Rfl: 1  •  rOPINIRole (REQUIP) 1 mg tablet, TAKE 1 TABLET BY MOUTH EVERYDAY AT BEDTIME, Disp: 90 tablet, Rfl: 1  •  simvastatin (ZOCOR) 40 mg tablet, Take 1 tablet (40 mg total) by mouth daily at bedtime, Disp: 90 tablet, Rfl: 3  •  tamsulosin (FLOMAX) 0 4 mg, TAKE 1 CAPSULE BY MOUTH EVERY DAY WITH DINNER, Disp: 90 capsule, Rfl: 1    No Known Allergies         Vitals:       Objective:            Physical Exam  Physical Exam:      General Appearance:    Alert, cooperative, no distress, appears stated age   Head:    Normocephalic, without obvious abnormality, atraumatic   Eyes:    conjunctiva/corneas clear, both eyes         Nose:   Nares normal, septum midline, no drainage    Throat:   Lips normal; teeth and gums normal   Neck:    symmetrical, trachea midline, ;     thyroid:  no enlargement/   Back:     Symmetric, no curvature, ROM normal   Lungs:   No audible wheezing or labored breathing   Chest Wall:    No tenderness or deformity    Heart:    Regular rate and rhythm                         Pulses:   2+ and symmetric all extremities   Skin:   Skin color, texture, turgor normal, no rashes or lesions   Neurologic:   normal strength, sensation and reflexes     throughout                       Ortho Exam   Right knee  Surgical scars medial and lateral well healed  No erythema   Full extension   Mild pain with flexion of knee against resistance   TTP Medial and lateral joint lines  Stable to varus and valgus stress   Neurovascularly Intact Distally     Diagnostics, reviewed and taken today if performed as documented:     The attending physician has personally reviewed the pertinent films in PACS and interpretation is as follows: x-ray bilateral knee demonstrates mild patellofemoral arthritic changes with osteophyte formation noted, lateral patella tilt , worse on the right and mild to moderate medial compartment joint space narrowing       Procedures, if performed today:    Procedures    None performed      Scribe Attestation    I,:  Alina Muse am acting as a scribe while in the presence of the attending physician :       I,:  Jared Allen MD personally performed the services described in this documentation    as scribed in my presence :             Portions of the record may have been created with voice recognition software  Occasional wrong word or "sound a like" substitutions may have occurred due to the inherent limitations of voice recognition software  Read the chart carefully and recognize, using context, where substitutions have occurred

## 2022-11-17 ENCOUNTER — OFFICE VISIT (OUTPATIENT)
Dept: FAMILY MEDICINE CLINIC | Facility: CLINIC | Age: 62
End: 2022-11-17

## 2022-11-17 VITALS
SYSTOLIC BLOOD PRESSURE: 130 MMHG | OXYGEN SATURATION: 94 % | BODY MASS INDEX: 45.26 KG/M2 | TEMPERATURE: 97.1 F | DIASTOLIC BLOOD PRESSURE: 80 MMHG | HEART RATE: 69 BPM | WEIGHT: 305.6 LBS | HEIGHT: 69 IN | RESPIRATION RATE: 14 BRPM

## 2022-11-17 DIAGNOSIS — Z12.11 SCREEN FOR COLON CANCER: ICD-10-CM

## 2022-11-17 DIAGNOSIS — N18.31 STAGE 3A CHRONIC KIDNEY DISEASE (HCC): ICD-10-CM

## 2022-11-17 DIAGNOSIS — E78.2 MIXED HYPERLIPIDEMIA: ICD-10-CM

## 2022-11-17 DIAGNOSIS — E66.01 MORBID OBESITY WITH BMI OF 40.0-44.9, ADULT (HCC): ICD-10-CM

## 2022-11-17 DIAGNOSIS — M17.0 PRIMARY OSTEOARTHRITIS OF BOTH KNEES: ICD-10-CM

## 2022-11-17 DIAGNOSIS — I10 ESSENTIAL HYPERTENSION: Primary | ICD-10-CM

## 2022-11-17 NOTE — ASSESSMENT & PLAN NOTE
Lab Results   Component Value Date    EGFR 46 08/15/2022    EGFR 48 05/11/2022    EGFR 53 02/11/2022    CREATININE 1 56 (H) 08/15/2022    CREATININE 1 52 (H) 05/11/2022    CREATININE 1 41 (H) 02/11/2022   GFR was 56-46   Recheck BMP

## 2022-11-17 NOTE — PROGRESS NOTES
Name: Corby Loera      : 1960      MRN: [id number]  Encounter Provider: Tata Lane MD  Encounter Date: 2022   Encounter department: [address] [place] [address]  Essential hypertension  Assessment & Plan:  Blood pressure under control with lisinopril  Recheck BMP      2  Stage 3a chronic kidney disease [facility])  Assessment & Plan:  Lab Results   Component Value Date    EGFR 46 08/15/2022    EGFR 48 2022    EGFR 53 2022    CREATININE 1 56 (H) 08/15/2022    CREATININE 1 52 (H) 2022    CREATININE 1 41 (H) 2022   GFR was 56-46  Recheck BMP    Orders:  -     Basic metabolic panel; Future    3  Mixed hyperlipidemia    4  Morbid obesity with BMI of 40 0-44 9, adult [facility])  Assessment & Plan:  Patient is not using Ozempic anymore due to price  Refer patient to weight Management program    Orders:  -     Ambulatory Referral to Weight Management; Future    5  Primary osteoarthritis of both knees  Assessment & Plan:  Patient would like to get 2nd opinion  He wants to go see Dr Mitchel Emery    Orders:  -     Ambulatory Referral to Orthopedic Surgery; Future    6  Screen for colon cancer  -     Ambulatory referral for colonoscopy; Future      Depression Screening and Follow-up Plan: Patient advised to follow-up with PCP for further management  Subjective      1  htn- no headache dizziness or lightheadedness  No chest pain or dyspnea  No edema  No orthopnea  No palpitations  No syncope  No cough  No angioedema  2  Dyslipidemia- on simvastatin  No myalgia or muscle cramps  No claudication pain  No nausea vomiting or abdominal pain  No chest pain  3  Morbid obesity- patient has trouble losing weight as well as controlling some of the food  Lanora Baba is too expensive so he stopped using it  TSH was normal   He also has obstructive sleep apnea  Review of Systems   Constitutional: Negative for chills, fatigue and fever     HENT: Negative for congestion, nosebleeds and rhinorrhea  Eyes: Negative for discharge and visual disturbance  Respiratory: Negative for cough, chest tightness, shortness of breath and wheezing  Cardiovascular: Negative for chest pain  Gastrointestinal: Negative for abdominal distention, abdominal pain, constipation, diarrhea and vomiting  Endocrine: Negative for polydipsia and polyuria  Genitourinary: Negative for difficulty urinating, frequency and hematuria  Musculoskeletal: Negative for arthralgias, back pain and myalgias  Skin: Negative for rash  Allergic/Immunologic: Negative for environmental allergies  Neurological: Negative for dizziness, syncope, weakness, light-headedness and headaches  Hematological: Negative  Psychiatric/Behavioral: Negative for agitation, confusion, decreased concentration and dysphoric mood  The patient is not nervous/anxious  All other systems reviewed and are negative  Current Outpatient Medications on File Prior to Visit   Medication Sig   • aspirin (ECOTRIN LOW STRENGTH) 81 mg EC tablet Take 81 mg by mouth daily   • cyclobenzaprine (FLEXERIL) 5 mg tablet Take 1 tablet (5 mg total) by mouth as needed in the morning and 1 tablet (5 mg total) as needed in the evening for muscle spasms     • ketoconazole (NIZORAL) 2 % cream APPLY TO AFFECTED AREA TWICE A DAY   • lisinopril (ZESTRIL) 20 mg tablet TAKE 1 TABLET BY MOUTH EVERY DAY IN THE MORNING   • metoprolol tartrate (LOPRESSOR) 50 mg tablet TAKE 1 TABLET BY MOUTH EVERY 12 HOURS   • omeprazole (PriLOSEC) 20 mg delayed release capsule TAKE 1 CAPSULE (20 MG TOTAL) BY MOUTH DAILY BEFORE BREAKFAST   • rOPINIRole (REQUIP) 1 mg tablet TAKE 1 TABLET BY MOUTH EVERYDAY AT BEDTIME   • simvastatin (ZOCOR) 40 mg tablet Take 1 tablet (40 mg total) by mouth daily at bedtime   • tamsulosin (FLOMAX) 0 4 mg TAKE 1 CAPSULE BY MOUTH EVERY DAY WITH DINNER   • [DISCONTINUED] Ozempic, 0 25 or 0 5 MG/DOSE, 2 MG/1 5ML SOPN INJECT 0 5 MG UNDER THE SKIN ONCE A WEEK       Objective     /80 (BP Location: Left arm, Patient Position: Sitting, Cuff Size: Standard)   Pulse 69   Temp (!) 97 1 °F (36 2 °C) (Temporal)   Resp 14   Ht 5' 9" (1 753 m)   Wt (!) 139 kg (305 lb 9 6 oz)   SpO2 94%   BMI 45 13 kg/m²     Physical Exam  Vitals and nursing note reviewed  Constitutional:       General: He is not in acute distress  Appearance: Normal appearance  He is well-developed  HENT:      Head: Normocephalic and atraumatic  Eyes:      General:         Right eye: No discharge  Left eye: No discharge  Neck:      Thyroid: No thyromegaly  Cardiovascular:      Rate and Rhythm: Normal rate and regular rhythm  Pulses: Normal pulses  Heart sounds: Normal heart sounds  No murmur heard  Pulmonary:      Effort: Pulmonary effort is normal       Breath sounds: Normal breath sounds  No wheezing or rhonchi  Abdominal:      General: Bowel sounds are normal  There is no distension  Palpations: Abdomen is soft  Tenderness: There is no abdominal tenderness  Musculoskeletal:         General: No swelling or tenderness  Cervical back: Neck supple  Right lower leg: No edema  Left lower leg: No edema  Lymphadenopathy:      Cervical: No cervical adenopathy  Skin:     General: Skin is warm  Capillary Refill: Capillary refill takes less than 2 seconds  Findings: No erythema  Neurological:      General: No focal deficit present  Mental Status: He is alert and oriented to person, place, and time  Psychiatric:         Mood and Affect: Mood normal          Behavior: Behavior normal          Thought Content:  Thought content normal        Tata Lane MD

## 2023-01-24 ENCOUNTER — VBI (OUTPATIENT)
Dept: ADMINISTRATIVE | Facility: OTHER | Age: 63
End: 2023-01-24

## 2023-01-29 DIAGNOSIS — N40.0 BPH WITHOUT OBSTRUCTION/LOWER URINARY TRACT SYMPTOMS: ICD-10-CM

## 2023-01-29 DIAGNOSIS — I10 ESSENTIAL HYPERTENSION: ICD-10-CM

## 2023-01-30 RX ORDER — TAMSULOSIN HYDROCHLORIDE 0.4 MG/1
CAPSULE ORAL
Qty: 90 CAPSULE | Refills: 1 | Status: SHIPPED | OUTPATIENT
Start: 2023-01-30

## 2023-01-30 RX ORDER — LISINOPRIL 20 MG/1
TABLET ORAL
Qty: 90 TABLET | Refills: 1 | Status: SHIPPED | OUTPATIENT
Start: 2023-01-30

## 2023-02-06 ENCOUNTER — HOSPITAL ENCOUNTER (OUTPATIENT)
Dept: RADIOLOGY | Facility: MEDICAL CENTER | Age: 63
Discharge: HOME | End: 2023-02-06

## 2023-02-06 DIAGNOSIS — N18.31 STAGE 3A CHRONIC KIDNEY DISEASE (HCC): ICD-10-CM

## 2023-02-12 DIAGNOSIS — I10 ESSENTIAL HYPERTENSION: ICD-10-CM

## 2023-02-13 RX ORDER — METOPROLOL TARTRATE 50 MG/1
TABLET, FILM COATED ORAL
Qty: 180 TABLET | Refills: 1 | Status: SHIPPED | OUTPATIENT
Start: 2023-02-13

## 2023-02-15 ENCOUNTER — RA CDI HCC (OUTPATIENT)
Dept: OTHER | Facility: HOSPITAL | Age: 63
End: 2023-02-15

## 2023-02-15 NOTE — PROGRESS NOTES
[facility]  coding opportunities       Chart reviewed, no opportunity found:   [address]        Patients Insurance     Medicare Insurance: [employer]

## 2023-02-20 ENCOUNTER — APPOINTMENT (OUTPATIENT)
Dept: LAB | Facility: MEDICAL CENTER | Age: 63
End: 2023-02-20

## 2023-02-20 DIAGNOSIS — N18.31 STAGE 3A CHRONIC KIDNEY DISEASE (HCC): ICD-10-CM

## 2023-02-20 LAB
ANION GAP SERPL CALCULATED.3IONS-SCNC: 7 MMOL/L (ref 4–13)
BUN SERPL-MCNC: 15 MG/DL (ref 5–25)
CALCIUM SERPL-MCNC: 9.7 MG/DL (ref 8.3–10.1)
CHLORIDE SERPL-SCNC: 105 MMOL/L (ref 96–108)
CO2 SERPL-SCNC: 25 MMOL/L (ref 21–32)
CREAT SERPL-MCNC: 1.51 MG/DL (ref 0.6–1.3)
GFR SERPL CREATININE-BSD FRML MDRD: 48 ML/MIN/1.73SQ M
GLUCOSE P FAST SERPL-MCNC: 117 MG/DL (ref 65–99)
POTASSIUM SERPL-SCNC: 4 MMOL/L (ref 3.5–5.3)
SODIUM SERPL-SCNC: 137 MMOL/L (ref 135–147)

## 2023-02-21 ENCOUNTER — OFFICE VISIT (OUTPATIENT)
Dept: FAMILY MEDICINE CLINIC | Facility: CLINIC | Age: 63
End: 2023-02-21

## 2023-02-21 VITALS
HEART RATE: 88 BPM | TEMPERATURE: 96.8 F | DIASTOLIC BLOOD PRESSURE: 76 MMHG | OXYGEN SATURATION: 95 % | BODY MASS INDEX: 45.8 KG/M2 | HEIGHT: 69 IN | SYSTOLIC BLOOD PRESSURE: 116 MMHG | WEIGHT: 309.2 LBS

## 2023-02-21 DIAGNOSIS — F32.0 CURRENT MILD EPISODE OF MAJOR DEPRESSIVE DISORDER WITHOUT PRIOR EPISODE (HCC): ICD-10-CM

## 2023-02-21 DIAGNOSIS — I10 ESSENTIAL HYPERTENSION: ICD-10-CM

## 2023-02-21 DIAGNOSIS — N18.31 STAGE 3A CHRONIC KIDNEY DISEASE (HCC): ICD-10-CM

## 2023-02-21 DIAGNOSIS — Z12.5 SCREENING FOR PROSTATE CANCER: ICD-10-CM

## 2023-02-21 DIAGNOSIS — Z12.11 SCREENING FOR MALIGNANT NEOPLASM OF COLON: ICD-10-CM

## 2023-02-21 DIAGNOSIS — E66.01 MORBID OBESITY WITH BMI OF 40.0-44.9, ADULT (HCC): ICD-10-CM

## 2023-02-21 DIAGNOSIS — Z00.00 MEDICARE ANNUAL WELLNESS VISIT, SUBSEQUENT: Primary | ICD-10-CM

## 2023-02-21 NOTE — PROGRESS NOTES
Assessment and Plan:     Problem List Items Addressed This Visit        Cardiovascular and Mediastinum    Essential hypertension    Relevant Orders    CBC and differential    Comprehensive metabolic panel    TSH, 3rd generation    Lipid panel    Urinalysis with microscopic       Genitourinary    Stage 3a chronic kidney disease ([facility] )       Other    Current mild episode of major depressive disorder without prior episode ([facility] )    Morbid obesity with BMI of 40 0-44 9, adult ([place] [address] )    Medicare annual wellness visit, subsequent - Primary     Patient does not want colonoscopy but he agreed for Cologuard  Check PSA  Other Visit Diagnoses     Screening for malignant neoplasm of colon        Relevant Orders    Cologuard    Screening for prostate cancer        Relevant Orders    PSA, Total Screen        BMI Counseling: Body mass index is 45 66 kg/m²  The BMI is above normal  Nutrition recommendations include decreasing portion sizes, decreasing fast food intake, consuming healthier snacks, limiting drinks that contain sugar, moderation in carbohydrate intake and reducing intake of cholesterol  Exercise recommendations include exercising 3-5 times per week and strength training exercises  Rationale for BMI follow-up plan is due to patient being overweight or obese  Depression Screening and Follow-up Plan: Patient advised to follow-up with PCP for further management  Preventive health issues were discussed with patient, and age appropriate screening tests were ordered as noted in patient's After Visit Summary  Personalized health advice and appropriate referrals for health education or preventive services given if needed, as noted in patient's After Visit Summary  History of Present Illness:     Patient presents for a Medicare Wellness Visit    1  Htn/ckd-3--blood pressure okay today  No headache dizziness or lightheadedness  No chest pain or dyspnea  No palpitations or syncope  No hematuria    No edema  Patient Care Team:  Yvonne Granados MD as PCP - General (Internal Medicine)  Yvonne Granados MD as PCP - [address] [place], [place] [id number] [facility] ([facility])     Review of Systems:     Review of Systems   Constitutional: Negative for chills, fatigue and fever  HENT: Negative for congestion, nosebleeds and rhinorrhea  Eyes: Negative for discharge and visual disturbance  Respiratory: Negative for cough, chest tightness, shortness of breath and wheezing  Cardiovascular: Negative for chest pain  Gastrointestinal: Negative for abdominal distention, abdominal pain, constipation, diarrhea and vomiting  Endocrine: Negative for polydipsia and polyuria  Genitourinary: Negative for difficulty urinating, frequency and hematuria  Musculoskeletal: Positive for back pain  Negative for arthralgias and myalgias  Skin: Negative for rash  Allergic/Immunologic: Negative for environmental allergies  Neurological: Negative for dizziness, syncope, weakness, light-headedness and headaches  Hematological: Negative  Psychiatric/Behavioral: Negative for agitation, confusion, decreased concentration, dysphoric mood, self-injury and suicidal ideas  The patient is not nervous/anxious  All other systems reviewed and are negative         Problem List:     Patient Active Problem List   Diagnosis   • Reflux esophagitis   • Essential hypertension   • Current mild episode of major depressive disorder without prior episode ([facility] )   • Encounter for preventative adult health care examination   • Morbid obesity with BMI of 40 0-44 9, adult ([facility] )   • Primary osteoarthritis of left knee   • Gastroesophageal reflux disease   • Complex tear of medial meniscus of right knee as current injury   • Primary osteoarthritis of right knee   • Tinea pedis of left foot   • Medicare annual wellness visit, subsequent   • Stage 3a chronic kidney disease ([facility] )   • MURRAY (dyspnea on exertion)   • Mixed hyperlipidemia   • Pre-diabetes   • Spasm of paraspinal muscle   • Right ear pain   • Primary osteoarthritis of both knees      Past Medical and Surgical History:     Past Medical History:   Diagnosis Date   • A-fib ([address] )    • Arthritis    • Cardiac arrhythmia     afib s/p ablasion    • GERD (gastroesophageal reflux disease)    • Hyperlipidemia     ldl 123 from 179 from 138, -  zocor    • Hypertension    • Metabolic syndrome     history of metabolic syndrome with insulin resistance  Diet and lifestyle modification, weight reduction, daily exercise  Patient education regarding metabolic syndrome, insulin resistant, dyslipidemia  • Nocturia    • Renal disorder     chronic kidney disease stage 2   • Restless legs    • Seasonal allergies    • Sleep apnea     no cpap   • Vertigo      Past Surgical History:   Procedure Laterality Date   • CARDIAC ELECTROPHYSIOLOGY MAPPING AND ABLATION  2019   • COLONOSCOPY  01/01/2012    no polyps   • COLONOSCOPY     • PALATE / UVULA BIOPSY / EXCISION     • ID ARTHRS KNE SURG W/MENISCECTOMY MED/LAT W/SHVG Right 8/5/2021    Procedure: KNEE ARTHROSCOPIC PARTIAL MEDIAL MENISCECTOMY, CHONDROPLASTY;  Surgeon: Meaghan Schultz MD;  Location: [facility] MAIN OR;  Service: Orthopedics   • SHOULDER SURGERY Bilateral    • SHOULDER SURGERY Bilateral     s/p 4 surgery - 2 on each side        Family History:     Family History   Problem Relation Age of Onset   • Cancer Mother    • Heart disease Father    • Diabetes Sister    • Heart disease Brother    • Cancer Brother       Social History:     Social History     Socioeconomic History   • Marital status: /Civil Union     Spouse name: None   • Number of children: None   • Years of education: None   • Highest education level: None   Occupational History   • None   Tobacco Use   • Smoking status: Never   • Smokeless tobacco: Never   Vaping Use   • Vaping Use: Never used   Substance and Sexual Activity   • Alcohol use: Never   • Drug use: Never   • Sexual activity: None   Other Topics Concern   • None   Social History Narrative    · Do you currently or have you served in the [employer]:   No      · Alcohol intake:   Occasional      · General stress level:   Medium      · Occupation:    and truck drivers      · Caffeine intake:   Occasional      Social Determinants of Health     Financial Resource Strain: Low Risk    • Difficulty of Paying Living Expenses: Not hard at all   Food Insecurity: Not on file   Transportation Needs: No Transportation Needs   • Lack of Transportation (Medical): No   • Lack of Transportation (Non-Medical): No   Physical Activity: Not on file   Stress: Not on file   Social Connections: Not on file   Intimate Partner Violence: Not on file   Housing Stability: Not on file      Medications and Allergies:     Current Outpatient Medications   Medication Sig Dispense Refill   • aspirin (ECOTRIN LOW STRENGTH) 81 mg EC tablet Take 81 mg by mouth daily     • ketoconazole (NIZORAL) 2 % cream APPLY TO AFFECTED AREA TWICE A DAY 30 g 1   • lisinopril (ZESTRIL) 20 mg tablet TAKE 1 TABLET BY MOUTH EVERY DAY IN THE MORNING 90 tablet 1   • metoprolol tartrate (LOPRESSOR) 50 mg tablet TAKE 1 TABLET BY MOUTH EVERY 12 HOURS 180 tablet 1   • omeprazole (PriLOSEC) 20 mg delayed release capsule TAKE 1 CAPSULE (20 MG TOTAL) BY MOUTH DAILY BEFORE BREAKFAST 30 capsule 5   • rOPINIRole (REQUIP) 1 mg tablet TAKE 1 TABLET BY MOUTH EVERYDAY AT BEDTIME 90 tablet 1   • simvastatin (ZOCOR) 40 mg tablet Take 1 tablet (40 mg total) by mouth daily at bedtime 90 tablet 3   • tamsulosin (FLOMAX) 0 4 mg TAKE 1 CAPSULE BY MOUTH EVERY DAY WITH DINNER 90 capsule 1     No current facility-administered medications for this visit       No Known Allergies   Immunizations:     Immunization History   Administered Date(s) Administered   • COVID-19 PFIZER VACCINE 0 3 ML IM 03/17/2021, 04/12/2021, 12/20/2021      Health Maintenance:         Topic Date Due   • Colorectal Cancer Screening  Never done   • Hepatitis C Screening  05/17/2023 (Originally 1960)   • HIV Screening  09/29/2023 (Originally 7/27/1975)         Topic Date Due   • COVID-19 Vaccine (4 - Booster for Pfizer series) 02/14/2022      Medicare Screening Tests and Risk Assessments:     Stephan Varghese is here for his Subsequent Wellness visit  Last Medicare Wellness visit information reviewed, patient interviewed and updates made to the record today  Health Risk Assessment:   Patient rates overall health as good  Patient feels that their physical health rating is same  Patient is satisfied with their life  Eyesight was rated as same  Hearing was rated as same  Patient feels that their emotional and mental health rating is same  Patients states they are never, rarely angry  Patient states they are sometimes unusually tired/fatigued  Pain experienced in the last 7 days has been some  Patient's pain rating has been 2/10  Patient states that he has experienced no weight loss or gain in last 6 months  Pt stated this past Sunday pt had chest pain, front and back of chest -- no SOB or pain in arms    Depression Screening:   PHQ-9 Score: 12      Fall Risk Screening: In the past year, patient has experienced: no history of falling in past year      Home Safety:  Patient does not have trouble with stairs inside or outside of their home  Patient has working smoke alarms and has working carbon monoxide detector  Home safety hazards include: none  Pt has small dog    Nutrition:   Current diet is Regular  Pt states has balanced diet --- protein and vegetables, would like to incorporate more vegetables/fruits    Medications:   Patient is not currently taking any over-the-counter supplements  Patient is able to manage medications  Activities of Daily Living (ADLs)/Instrumental Activities of Daily Living (IADLs):   Walk and transfer into and out of bed and chair?: Yes  Dress and groom yourself?: Yes    Bathe or shower yourself?: Yes    Feed yourself? Yes  Do your laundry/housekeeping?: Yes  Manage your money, pay your bills and track your expenses?: Yes  Make your own meals?: Yes    Do your own shopping?: Yes    Previous Hospitalizations:   Any hospitalizations or ED visits within the last 12 months?: No      Advance Care Planning:   Living will: No      Comments: Pt was given 5 wishes and informed to fill out documentation and bring in a hard copy to be scanned into [facility] docs  Cognitive Screening:   Provider or family/friend/caregiver concerned regarding cognition?: No    PREVENTIVE SCREENINGS      Cardiovascular Screening:    General: History Lipid Disorder and Screening Current      Diabetes Screening:     General: Screening Current      Colorectal Cancer Screening:       Due for: Cologuard      Prostate Cancer Screening:    General: Screening Current      Osteoporosis Screening:    General: Screening Not Indicated      Lung Cancer Screening:     General: Screening Not Indicated      Hepatitis C Screening:    General: Screening Current    Screening, Brief Intervention, and Referral to Treatment (SBIRT)    Screening  Typical number of drinks in a day: 0  Typical number of drinks in a week: 0  Interpretation: Low risk drinking behavior  Single Item Drug Screening:  How often have you used an illegal drug (including marijuana) or a prescription medication for non-medical reasons in the past year? never    Single Item Drug Screen Score: 0  Interpretation: Negative screen for possible drug use disorder    Other Counseling Topics:   Car/seat belt/driving safety, skin self-exam, sunscreen and calcium and vitamin D intake and regular weightbearing exercise  No results found       Physical Exam:     /76 (BP Location: Left arm, Patient Position: Sitting, Cuff Size: Standard)   Pulse 88   Temp (!) 96 8 °F (36 °C) (Temporal)   Ht 5' 9" (1 753 m)   Wt (!) 140 kg (309 lb 3 2 oz)   SpO2 95%   BMI 45 66 kg/m²     Physical Exam  Vitals and nursing note reviewed  Constitutional:       General: He is not in acute distress  Appearance: He is well-developed  HENT:      Head: Normocephalic and atraumatic  Eyes:      Conjunctiva/sclera: Conjunctivae normal    Cardiovascular:      Rate and Rhythm: Normal rate and regular rhythm  Pulses: Normal pulses  Heart sounds: Normal heart sounds  No murmur heard  Pulmonary:      Effort: Pulmonary effort is normal  No respiratory distress  Breath sounds: Normal breath sounds  Abdominal:      General: Bowel sounds are normal  There is no distension  Palpations: Abdomen is soft  There is no mass  Tenderness: There is no abdominal tenderness  Musculoskeletal:         General: No swelling  Cervical back: Neck supple  Right lower leg: No edema  Left lower leg: No edema  Skin:     General: Skin is warm and dry  Capillary Refill: Capillary refill takes less than 2 seconds  Neurological:      Mental Status: He is alert and oriented to person, place, and time     Psychiatric:         Mood and Affect: Mood normal          Behavior: Behavior normal           Quinten Nunn MD

## 2023-02-21 NOTE — PATIENT INSTRUCTIONS
Medicare Preventive Visit Patient Instructions  Thank you for completing your Welcome to Medicare Visit or Medicare Annual Wellness Visit today  Your next wellness visit will be due in one year (2/22/2024)  The screening/preventive services that you may require over the next 5-10 years are detailed below  Some tests may not apply to you based off risk factors and/or age  Screening tests ordered at today's visit but not completed yet may show as past due  Also, please note that scanned in results may not display below  Preventive Screenings:  Service Recommendations Previous Testing/Comments   Colorectal Cancer Screening  · Colonoscopy    · Fecal Occult Blood Test (FOBT)/Fecal Immunochemical Test (FIT)  · Fecal DNA/Cologuard Test  · Flexible Sigmoidoscopy Age: 39-70 years old   Colonoscopy: every 10 years (May be performed more frequently if at higher risk)  OR  FOBT/FIT: every 1 year  OR  Cologuard: every 3 years  OR  Sigmoidoscopy: every 5 years  Screening may be recommended earlier than age 39 if at higher risk for colorectal cancer  Also, an individualized decision between you and your healthcare provider will decide whether screening between the ages of 74-80 would be appropriate   Colonoscopy: Not on file  FOBT/FIT: Not on file  Cologuard: Not on file  Sigmoidoscopy: Not on file          Prostate Cancer Screening Individualized decision between patient and health care provider in men between ages of 53-78   Medicare will cover every 12 months beginning on the day after your 50th birthday PSA: 1 2 ng/mL     Screening Current     Hepatitis C Screening Once for adults born between [facility]  More frequently in patients at high risk for Hepatitis C Hep C Antibody: Not on file    Screening Current   Diabetes Screening 1-2 times per year if you're at risk for diabetes or have pre-diabetes Fasting glucose: 117 mg/dL (2/20/2023)  A1C: No results in last 5 years (No results in last 5 years)  Screening Current Cholesterol Screening Once every 5 years if you don't have a lipid disorder  May order more often based on risk factors  Lipid panel: 05/11/2022  Screening Not Indicated  History Lipid Disorder      Other Preventive Screenings Covered by Medicare:  1  Abdominal Aortic Aneurysm (AAA) Screening: covered once if your at risk  You're considered to be at risk if you have a family history of AAA or a male between the age of 73-68 who smoking at least 100 cigarettes in your lifetime  2  Lung Cancer Screening: covers low dose CT scan once per year if you meet all of the following conditions: (1) Age 50-69; (2) No signs or symptoms of lung cancer; (3) Current smoker or have quit smoking within the last 15 years; (4) You have a tobacco smoking history of at least 20 pack years (packs per day x number of years you smoked); (5) You get a written order from a healthcare provider  3  Glaucoma Screening: covered annually if you're considered high risk: (1) You have diabetes OR (2) Family history of glaucoma OR (3)  aged 48 and older OR (3)  American aged 72 and older  3  Osteoporosis Screening: covered every 2 years if you meet one of the following conditions: (1) Have a vertebral abnormality; (2) On glucocorticoid therapy for more than 3 months; (3) Have primary hyperparathyroidism; (4) On osteoporosis medications and need to assess response to drug therapy  5  HIV Screening: covered annually if you're between the age of 12-76  Also covered annually if you are younger than 13 and older than 72 with risk factors for HIV infection  For pregnant patients, it is covered up to 3 times per pregnancy      Immunizations:  Immunization Recommendations   Influenza Vaccine Annual influenza vaccination during flu season is recommended for all persons aged >= 6 months who do not have contraindications   Pneumococcal Vaccine   * Pneumococcal conjugate vaccine = PCV13 (Prevnar 13), PCV15 (Vaxneuvance), PCV20 (Prevnar 20)  * Pneumococcal polysaccharide vaccine = PPSV23 (Pneumovax) Adults 2364 years old: 1-3 doses may be recommended based on certain risk factors  Adults 72 years old: 1-2 doses may be recommended based off what pneumonia vaccine you previously received   Hepatitis B Vaccine 3 dose series if at intermediate or high risk (ex: diabetes, end stage renal disease, liver disease)   Tetanus (Td) Vaccine - COST NOT COVERED BY MEDICARE PART B Following completion of primary series, a booster dose should be given every 10 years to maintain immunity against tetanus  Td may also be given as tetanus wound prophylaxis  Tdap Vaccine - COST NOT COVERED BY MEDICARE PART B Recommended at least once for all adults  For pregnant patients, recommended with each pregnancy  Shingles Vaccine (Shingrix) - COST NOT COVERED BY MEDICARE PART B  2 shot series recommended in those aged 48 and above     Health Maintenance Due:      Topic Date Due   • Colorectal Cancer Screening  Never done   • Hepatitis C Screening  05/17/2023 (Originally 1960)   • HIV Screening  09/29/2023 (Originally 7/27/1975)     Immunizations Due:      Topic Date Due   • COVID-19 Vaccine (4 - Booster for John Peter series) 02/14/2022     Advance Directives   What are advance directives? Advance directives are legal documents that state your wishes and plans for medical care  These plans are made ahead of time in case you lose your ability to make decisions for yourself  Advance directives can apply to any medical decision, such as the treatments you want, and if you want to donate organs  What are the types of advance directives? There are many types of advance directives, and each state has rules about how to use them  You may choose a combination of any of the following:  · Living will: This is a written record of the treatment you want  You can also choose which treatments you do not want, which to limit, and which to stop at a certain time   This includes surgery, medicine, IV fluid, and tube feedings  · Durable power of  for healthcare [facility]): This is a written record that states who you want to make healthcare choices for you when you are unable to make them for yourself  This person, called a proxy, is usually a family member or a friend  You may choose more than 1 proxy  · Do not resuscitate (DNR) order:  A DNR order is used in case your heart stops beating or you stop breathing  It is a request not to have certain forms of treatment, such as CPR  A DNR order may be included in other types of advance directives  · Medical directive: This covers the care that you want if you are in a coma, near death, or unable to make decisions for yourself  You can list the treatments you want for each condition  Treatment may include pain medicine, surgery, blood transfusions, dialysis, IV or tube feedings, and a ventilator (breathing machine)  · Values history: This document has questions about your views, beliefs, and how you feel and think about life  This information can help others choose the care that you would choose  Why are advance directives important? An advance directive helps you control your care  Although spoken wishes may be used, it is better to have your wishes written down  Spoken wishes can be misunderstood, or not followed  Treatments may be given even if you do not want them  An advance directive may make it easier for your family to make difficult choices about your care  Weight Management   Why it is important to manage your weight:  Being overweight increases your risk of health conditions such as heart disease, high blood pressure, type 2 diabetes, and certain types of cancer  It can also increase your risk for osteoarthritis, sleep apnea, and other respiratory problems  Aim for a slow, steady weight loss  Even a small amount of weight loss can lower your risk of health problems    How to lose weight safely:  A safe and healthy way to lose weight is to eat fewer calories and get regular exercise  You can lose up about 1 pound a week by decreasing the number of calories you eat by 500 calories each day  Healthy meal plan for weight management:  A healthy meal plan includes a variety of foods, contains fewer calories, and helps you stay healthy  A healthy meal plan includes the following:  · Eat whole-grain foods more often  A healthy meal plan should contain fiber  Fiber is the part of grains, fruits, and vegetables that is not broken down by your body  Whole-grain foods are healthy and provide extra fiber in your diet  Some examples of whole-grain foods are whole-wheat breads and pastas, oatmeal, brown rice, and bulgur  · Eat a variety of vegetables every day  Include dark, leafy greens such as spinach, kale, aron greens, and mustard greens  Eat yellow and orange vegetables such as carrots, sweet potatoes, and winter squash  · Eat a variety of fruits every day  Choose fresh or canned fruit (canned in its own juice or light syrup) instead of juice  Fruit juice has very little or no fiber  · Eat low-fat dairy foods  Drink fat-free (skim) milk or 1% milk  Eat fat-free yogurt and low-fat cottage cheese  Try low-fat cheeses such as mozzarella and other reduced-fat cheeses  · Choose meat and other protein foods that are low in fat  Choose beans or other legumes such as split peas or lentils  Choose fish, skinless poultry (chicken or turkey), or lean cuts of red meat (beef or pork)  Before you cook meat or poultry, cut off any visible fat  · Use less fat and oil  Try baking foods instead of frying them  Add less fat, such as margarine, sour cream, regular salad dressing and mayonnaise to foods  Eat fewer high-fat foods  Some examples of high-fat foods include french fries, doughnuts, ice cream, and cakes  · Eat fewer sweets  Limit foods and drinks that are high in sugar   This includes candy, cookies, regular soda, and sweetened drinks  Exercise:  Exercise at least 30 minutes per day on most days of the week  Some examples of exercise include walking, biking, dancing, and swimming  You can also fit in more physical activity by taking the stairs instead of the elevator or parking farther away from stores  Ask your healthcare provider about the best exercise plan for you  © Copyright [employer] 2018 Information is for End User's use only and may not be sold, redistributed or otherwise used for commercial purposes   All illustrations and images included in CareNotes® are the copyrighted property of A D A M , Inc  or [address] [place] [address] [employer]

## 2023-03-09 LAB — COLOGUARD RESULT REPORTABLE: NEGATIVE

## 2023-04-22 PROBLEM — Z00.00 MEDICARE ANNUAL WELLNESS VISIT, SUBSEQUENT: Status: RESOLVED | Noted: 2022-02-16 | Resolved: 2023-04-22

## 2023-05-14 PROBLEM — E66.813 CLASS 3 SEVERE OBESITY DUE TO EXCESS CALORIES WITH SERIOUS COMORBIDITY AND BODY MASS INDEX (BMI) OF 45.0 TO 49.9 IN ADULT (HCC): Status: ACTIVE | Noted: 2021-02-11

## 2023-05-14 NOTE — PROGRESS NOTES
BMI Counseling: Body mass index is 44 83 kg/m²  The BMI is above normal  Nutrition recommendations include decreasing portion sizes, encouraging healthy choices of fruits and vegetables, decreasing fast food intake, consuming healthier snacks, limiting drinks that contain sugar, moderation in carbohydrate intake, increasing intake of lean protein, reducing intake of saturated and trans fat and reducing intake of cholesterol  Exercise recommendations include vigorous physical activity 75 minutes/week, exercising 3-5 times per week, obtaining a gym membership and strength training exercises  No pharmacotherapy was ordered  Rationale for BMI follow-up plan is due to patient being overweight or obese  Depression Screening and Follow-up Plan: Their PHQ-9 score was 6  Patient assessed for underlying major depression  Brief counseling provided and recommend additional follow-up/re-evaluation next office visit  Patient declines further evaluation by mental health professional and/or medications  Brief counseling provided  Will re-evaluate at next office visit  Assessment/Plan:         Problem List Items Addressed This Visit        Digestive    Gastroesophageal reflux disease       Cardiovascular and Mediastinum    Essential hypertension - Primary       Musculoskeletal and Integument    Tinea pedis of left foot    Primary osteoarthritis of both knees       Genitourinary    Stage 3a chronic kidney disease ([address] )       Other    Current mild episode of major depressive disorder without prior episode ([facility])    Class 3 severe obesity due to excess calories with serious comorbidity and body mass index (BMI) of 45 0 to 49 9 in adult [facility])    Mixed hyperlipidemia    Pre-diabetes         Subjective:      Patient ID: Jori Santos is a 60 y o  male  Patient is a 60year old male present for follow-up office visit to establish care   Patient has a medical history of GERD, hypertension, osteoarthritis of bilateral knees, tinea pedis of left foot, stage III chronic kidney disease,       The following portions of the patient's history were reviewed and updated as appropriate:   Past Medical History:  He has a past medical history of A-fib ([address] ), Arthritis, Cardiac arrhythmia, GERD (gastroesophageal reflux disease), Hyperlipidemia, Hypertension, Metabolic syndrome, Nocturia, Renal disorder, Restless legs, Seasonal allergies, Sleep apnea, and Vertigo  ,  _______________________________________________________________________  Medical Problems:  does not have any pertinent problems on file ,  _______________________________________________________________________  Past Surgical History:   has a past surgical history that includes Palate / uvula biopsy / excision; Shoulder surgery (Bilateral); Colonoscopy (01/01/2012); Shoulder surgery (Bilateral); Colonoscopy; Cardiac electrophysiology mapping and ablation (2019); and pr arthrs kne surg w/meniscectomy med/lat w/shvg (Right, 8/5/2021)  ,  _______________________________________________________________________  Family History:  family history includes Cancer in his brother and mother; Diabetes in his sister; Heart disease in his brother and father ,  _______________________________________________________________________  Social History:   reports that he has never smoked  He has never used smokeless tobacco  He reports that he does not drink alcohol and does not use drugs  ,  _______________________________________________________________________  Allergies:  has No Known Allergies     _______________________________________________________________________  Current Outpatient Medications   Medication Sig Dispense Refill   • aspirin (ECOTRIN LOW STRENGTH) 81 mg EC tablet Take 81 mg by mouth daily     • lisinopril (ZESTRIL) 20 mg tablet TAKE 1 TABLET BY MOUTH EVERY DAY IN THE MORNING 90 tablet 1   • metoprolol tartrate (LOPRESSOR) 50 mg tablet TAKE 1 TABLET BY MOUTH EVERY 12 HOURS 180 tablet 1 "  • omeprazole (PriLOSEC) 20 mg delayed release capsule TAKE 1 CAPSULE (20 MG TOTAL) BY MOUTH DAILY BEFORE BREAKFAST 30 capsule 5   • rOPINIRole (REQUIP) 1 mg tablet TAKE 1 TABLET BY MOUTH EVERYDAY AT BEDTIME 30 tablet 0   • simvastatin (ZOCOR) 40 mg tablet Take 1 tablet (40 mg total) by mouth daily at bedtime 90 tablet 3   • tamsulosin (FLOMAX) 0 4 mg TAKE 1 CAPSULE BY MOUTH EVERY DAY WITH DINNER 90 capsule 1     No current facility-administered medications for this visit      _______________________________________________________________________  Review of Systems   Constitutional: Negative for activity change, appetite change, chills, fatigue, fever and unexpected weight change  HENT: Negative for congestion, ear discharge, ear pain, nosebleeds, postnasal drip, rhinorrhea, sinus pressure, sinus pain, sneezing, sore throat and voice change  Eyes: Negative for pain, redness and visual disturbance  Respiratory: Negative for cough, chest tightness, shortness of breath and wheezing  Cardiovascular: Negative for chest pain and palpitations  Gastrointestinal: Negative for abdominal distention, abdominal pain, constipation, diarrhea, nausea and vomiting  Endocrine: Negative  Genitourinary: Negative for difficulty urinating, dysuria, flank pain, frequency, hematuria and urgency  Musculoskeletal: Negative for arthralgias and myalgias  Skin: Negative  Allergic/Immunologic: Negative  Neurological: Negative  Hematological: Negative  Psychiatric/Behavioral: Negative  Objective:  Vitals:    05/16/23 1118   BP: 110/70   BP Location: Left arm   Patient Position: Sitting   Cuff Size: Large   Pulse: 66   Resp: 16   Temp: 98 6 °F (37 °C)   TempSrc: Temporal   SpO2: 98%   Weight: (!) 138 kg (303 lb 9 6 oz)   Height: 5' 9\" (1 753 m)     Body mass index is 44 83 kg/m²  Physical Exam  Vitals and nursing note reviewed  Constitutional:       Appearance: Normal appearance   He is " well-developed  He is obese  HENT:      Head: Normocephalic and atraumatic  Right Ear: Tympanic membrane, ear canal and external ear normal       Left Ear: Tympanic membrane, ear canal and external ear normal       Nose: Nose normal  No congestion or rhinorrhea  Mouth/Throat:      Mouth: Mucous membranes are moist       Pharynx: No oropharyngeal exudate or posterior oropharyngeal erythema  Eyes:      Extraocular Movements: Extraocular movements intact  Conjunctiva/sclera: Conjunctivae normal       Pupils: Pupils are equal, round, and reactive to light  Cardiovascular:      Rate and Rhythm: Normal rate and regular rhythm  Pulses: Normal pulses  Heart sounds: Normal heart sounds  No murmur heard  Pulmonary:      Effort: Pulmonary effort is normal       Breath sounds: Normal breath sounds  Abdominal:      General: Bowel sounds are normal       Palpations: Abdomen is soft  Musculoskeletal:         General: Normal range of motion  Cervical back: Normal range of motion  Skin:     General: Skin is warm  Capillary Refill: Capillary refill takes less than 2 seconds  Neurological:      General: No focal deficit present  Mental Status: He is alert and oriented to person, place, and time     Psychiatric:         Mood and Affect: Mood normal          Behavior: Behavior normal

## 2023-05-14 NOTE — PATIENT INSTRUCTIONS
Hypertension   WHAT YOU NEED TO KNOW:   What is hypertension? Hypertension is high blood pressure  Your blood pressure is the force of your blood moving against the walls of your arteries  Hypertension causes your blood pressure to get so high that your heart has to work much harder than normal  This can damage your heart  Hypertension that does not respond to medicines and lifestyle changes is called resistant hypertension  Hypertension is considered chronic when it continues for 3 months or longer  What do I need to know about the stages of hypertension? Your healthcare provider will give you a blood pressure goal based on your age, health, and risk for cardiovascular disease  The following are general guidelines on the stages of hypertension:  • Normal blood pressure is 119/79 or lower   Your healthcare provider may only check your blood pressure each year if it stays at a normal level  • Elevated blood pressure is 120/79 to 129/79   This is sometimes called prehypertension  Your healthcare provider may suggest lifestyle changes to help lower your blood pressure to a normal level  He or she may then check it again in 3 to 6 months  • Stage 1 hypertension is 130/80  to 139/89   Your provider may recommend lifestyle changes, medication, and checks every 3 to 6 months until your blood pressure is controlled  • Stage 2 hypertension is 140/90 or higher   Your provider will recommend lifestyle changes and have you take 2 kinds of hypertension medicines  You will also need to have your blood pressure checked monthly until it is controlled  What increases my risk for hypertension? The cause of hypertension may not be known  This is called essential or primary hypertension  Hypertension caused by another medical condition, such as kidney disease, is called secondary hypertension   Any of the following can increase your risk:  • Age older than 54 years (men) or 72 (women)    • Stress, or a family history of hypertension or heart disease    • Obesity, lack of exercise, or too many high-sodium foods    • Use of tobacco, alcohol, or illegal drugs    • A medical condition, such as diabetes, kidney disease, thyroid disease, or adrenal gland disorder    • Certain medicines, such as steroids or birth control pills    What are the signs and symptoms of hypertension? You may have no signs or symptoms, or you may have any of the following:  • Headache    • Blurred vision    • Chest pain    • Dizziness or weakness    • Trouble breathing    • Nosebleeds    How is hypertension diagnosed? Your healthcare provider will take your blood pressure at several visits  You may also need to check your blood pressure at home  The provider will examine you and ask about medicines you take  He or she will also ask if you have a family history of high blood pressure and about any health conditions you have  He or she will also check your blood pressure and weight and examine your heart, lungs, and eyes  You may need any of the following tests:  • An ambulatory blood pressure monitor (ABPM)  is a device that you wear  ABPM measures your blood pressure while you do your regular daily activities  It records your blood pressure every 15 to 30 minutes during the day  It also records your blood pressure every 15 minutes to 1 hour at night  The recorded blood pressures help your healthcare provider know if you have hypertension not seen at your appointment  • Blood tests  may help healthcare providers find the cause of your hypertension  Blood tests can also help find other health problems caused by hypertension  • Urine tests  will be done to check your kidney function  Kidney problems can increase your risk for hypertension  Which medicines are used to treat hypertension? • Antihypertensives  may be used to help lower your blood pressure  Several kinds of medicines are available   Your healthcare provider will choose medicines based on the kind of hypertension you have  You may need more than one type of medicine  Take the medicine exactly as directed  • Diuretics  help decrease extra fluid that collects in your body  This will help lower your blood pressure  You may urinate more often while you take this medicine  • Cholesterol medicine  helps lower your cholesterol level  A low cholesterol level helps prevent heart disease and makes it easier to control your blood pressure  What can I do to manage hypertension? • Check your blood pressure at home  Do not smoke, have caffeine, or exercise for at least 30 minutes before you check your blood pressure  Sit and rest for 5 minutes before you check your blood pressure  Extend your arm and support it on a flat surface  Your arm should be at the same level as your heart  Follow the directions that came with your blood pressure monitor  Check your blood pressure 2 times, 1 minute apart, before you take your medicine in the morning  Also check your blood pressure before your evening meal  Keep a record of your readings and bring it to your follow-up visits  Ask your healthcare provider what your blood pressure should be  • Manage any other health conditions you have  Health conditions such as diabetes can increase your risk for hypertension  Follow your healthcare provider's instructions and take all your medicines as directed  • Ask about all medicines  Certain medicines can increase your blood pressure  Examples include oral birth control pills, decongestants, herbal supplements, and NSAIDs, such as ibuprofen  Your healthcare provider can tell you which medicines are safe for you to take  This includes prescription and over-the-counter medicines  What lifestyle changes can I make to manage hypertension? Your healthcare provider may recommend you work with a team to manage hypertension   The team may include medical experts such as a dietitian, an exercise or physical therapist, and a behavior therapist  Your family members may be included in helping you create lifestyle changes  • Limit sodium (salt) as directed  Too much sodium can affect your fluid balance  Check labels to find low-sodium or no-salt-added foods  Some low-sodium foods use potassium salts for flavor  Too much potassium can also cause health problems  Your healthcare provider will tell you how much sodium and potassium are safe for you to have in a day  He or she may recommend that you limit sodium to 2,300 mg a day  • Follow the meal plan recommended by your healthcare provider  A dietitian or your provider can give you more information on low-sodium plans or the DASH (Dietary Approaches to Stop Hypertension) eating plan  The DASH plan is low in sodium, processed sugar, unhealthy fats, and total fat  It is high in potassium, calcium, and fiber  These can be found in vegetables, fruit, and whole-grain foods  • Be physically active throughout the day  Physical activity, such as exercise, can help control your blood pressure and your weight  Be physically active for at least 30 minutes per day, on most days of the week  Include aerobic activity, such as walking or riding a bicycle  Also include strength training at least 2 times each week  Your healthcare providers can help you create a physical activity plan  • Decrease stress  This may help lower your blood pressure  Learn ways to relax, such as deep breathing or listening to music  • Limit alcohol as directed  Alcohol can increase your blood pressure  A drink of alcohol is 12 ounces of beer, 5 ounces of wine, or 1½ ounces of liquor  • Do not smoke  Nicotine and other chemicals in cigarettes and cigars can increase your blood pressure and also cause lung damage  Ask your healthcare provider for information if you currently smoke and need help to quit  E-cigarettes or smokeless tobacco still contain nicotine   Talk to your healthcare provider before you use these products  Call your local emergency number (911 in the [address]) or have someone call if:   • You have chest pain  • You have any of the following signs of a heart attack:      ? Squeezing, pressure, or pain in your chest    ? You may  also have any of the following:     - Discomfort or pain in your back, neck, jaw, stomach, or arm    - Shortness of breath    - Nausea or vomiting    - Lightheadedness or a sudden cold sweat    • You become confused or have trouble speaking  • You suddenly feel lightheaded or have trouble breathing  When should I seek immediate care? • You have a severe headache or vision loss  • You have weakness in an arm or leg  When should I call my doctor? • You feel faint, dizzy, confused, or drowsy  • You have been taking your blood pressure medicine but your pressure is higher than your provider says it should be  • You have questions or concerns about your condition or care  CARE AGREEMENT:   You have the right to help plan your care  Learn about your health condition and how it may be treated  Discuss treatment options with your healthcare providers to decide what care you want to receive  You always have the right to refuse treatment  The above information is an  only  It is not intended as medical advice for individual conditions or treatments  Talk to your doctor, nurse or pharmacist before following any medical regimen to see if it is safe and effective for you  © Copyright [employer] 2022 Information is for End User's use only and may not be sold, redistributed or otherwise used for commercial purposes  Prediabetes   WHAT YOU NEED TO KNOW:   What is prediabetes? Prediabetes is a blood glucose (sugar) level that is higher than normal  It is not high enough to be considered diabetes  Prediabetes increases your risk for type 2 diabetes and heart disease   The risk is highest if you have high blood pressure or high cholesterol  What increases my risk for prediabetes? • Being overweight or obese, with a body mass index (BMI) of 25 or higher (23 or higher if you are  American)    • Lack of physical activity    • Older age    • Family history of diabetes (parent or sibling)    • A history of heart disease, gestational diabetes, or polycystic ovary syndrome (PCOS)    • High blood pressure or cholesterol levels    • Being [place] American, , , [place] American, or     • In children, having a mother with diabetes or gestational diabetes mellitus (GDM) during the pregnancy    What are the signs and symptoms of prediabetes? Prediabetes may not cause any symptoms  How is prediabetes diagnosed? Blood tests can help diagnose prediabetes even if no signs or symptoms have started  This is also called screening  Adults who are overweight or obese are usually tested every 3 years, starting at age 28  Your healthcare provider may recommend screening starting at an earlier or later age, depending on your overall risk for diabetes  Children who are at risk for diabetes may be tested  The following may be used to diagnose prediabetes:  • A fasting plasma glucose test  may be done to check your blood sugar level after you have not eaten for 8 hours  • A 2-hour plasma glucose test  starts with a blood sugar level check after you have not eaten for 8 hours  You are then given a glucose drink  Your blood sugar level is checked after 2 hours  • A hemoglobin A1c  is a blood test that measures your average blood sugar level for the past 2 to 3 months  An A1c of 5 7% to 6 4% means you have prediabetes  How do I prevent or delay type 2 diabetes? Healthy choices work best to delay or prevent type 2 diabetes  You may be given the following guidelines from your healthcare provider:  • Get regular physical activity    Adults should get at least 150 minutes (2 5 hours) of moderate physical activity every week  Spread the amount of activity over at least 3 days a week  Do not skip more than 2 days in a row  Children should get at least 60 minutes of moderate physical activity on most days of the week  Examples of moderate physical activity include brisk walking, running, and swimming  Do not sit for longer than 30 minutes at a time  Work with your healthcare provider to create a plan for physical activity  • Lose weight if you are overweight  A weight loss of 7% of your body weight can help  Your healthcare provider can tell you what weight is healthy for you  He or she can help you create a weight loss plan  • Eat healthy foods  Eat a variety of fruits and vegetables  Eat whole-grain foods more often  Choose dairy foods, meat, and other protein foods that are low in fat  Eat fewer sweets, such as candy, cookies, regular soda, and sweetened drinks  You can also decrease calories by eating smaller portion sizes  Work with your healthcare provider or dietitian to develop a meal plan that is right for you  • Take medicine as directed  Your healthcare provider may give diabetes medicine if you are at high risk for diabetes  You may also need medicines for high blood pressure and high cholesterol  • Follow up with your healthcare provider as directed  You will need to return every year to get tested for diabetes  • Do not smoke  Do not use e-cigarettes or smokeless tobacco in place of cigarettes or to help you quit  They still contain nicotine  Ask your healthcare provider for information if you currently smoke and need help quitting  • Start with small goals and work your way up  You may need to start with 3 days of physical activity  You can add a day after 3 weeks or so of activity  Make a goal of losing 5 pounds at first  Talk with healthcare providers about making a plan that is right for you  When should I call my doctor?    • You have more hunger or thirst than usual     • You are urinating more often than usual     • You are always exhausted  • You have blurred vision  • You have questions or concerns about your condition or care  CARE AGREEMENT:   You have the right to help plan your care  Learn about your health condition and how it may be treated  Discuss treatment options with your healthcare providers to decide what care you want to receive  You always have the right to refuse treatment  The above information is an  only  It is not intended as medical advice for individual conditions or treatments  Talk to your doctor, nurse or pharmacist before following any medical regimen to see if it is safe and effective for you  © Copyright Or[employer] 2022 Information is for End User's use only and may not be sold, redistributed or otherwise used for commercial purposes  Cholesterol and Your Health   WHAT YOU NEED TO KNOW:   What is cholesterol? Cholesterol is a waxy, fat-like substance  Your body uses cholesterol to make hormones and new cells, and to protect nerves  Cholesterol is made by your body  It also comes from certain foods you eat, such as meat and dairy products  Your healthcare provider can help you set goals for your cholesterol levels  He or she can help you create a plan to meet your goals  What are cholesterol level goals? Your cholesterol level goals depend on your risk for heart disease, your age, and your other health conditions  The following are general guidelines:  • Total cholesterol  includes low-density lipoprotein (LDL), high-density lipoprotein (HDL), and triglyceride levels  The total cholesterol level should be lower than 200 mg/dL and is best at about 150 mg/dL  • LDL cholesterol  is called bad cholesterol  because it forms plaque in your arteries  As plaque builds up, your arteries become narrow, and less blood flows through  When plaque decreases blood flow to your heart, you may have chest pain   If plaque completely blocks an artery that brings blood to your heart, you may have a heart attack  Plaque can break off and form blood clots  Blood clots may block arteries in your brain and cause a stroke  The level should be less than 130 mg/dL and is best at about 100 mg/dL  • HDL cholesterol  is called good cholesterol  because it helps remove LDL cholesterol from your arteries  It does this by attaching to LDL cholesterol and carrying it to your liver  Your liver breaks down LDL cholesterol so your body can get rid of it  High levels of HDL cholesterol can help prevent a heart attack and stroke  Low levels of HDL cholesterol can increase your risk for heart disease, heart attack, and stroke  The level should be 60 mg/dL or higher  • Triglycerides  are a type of fat that store energy from foods you eat  High levels of triglycerides also cause plaque buildup  This can increase your risk for a heart attack or stroke  If your triglyceride level is high, your LDL cholesterol level may also be high  The level should be less than 150 mg/dL  What increases my risk for high cholesterol? • Smoking cigarettes    • Being overweight or obese, or not getting enough exercise    • Drinking large amounts of alcohol    • A medical condition such as hypertension (high blood pressure) or diabetes    • Certain genes passed from your parents to you    • Age older than 72 years    What do I need to know about having my cholesterol levels checked? Adults 21to 39years of age should have their cholesterol levels checked every 4 to 6 years  Adults 45 years or older should have their cholesterol checked every 1 to 2 years  You may need your cholesterol checked more often, or at a younger age, if you have risk factors for heart disease  You may also need to have your cholesterol checked more often if you have other health conditions, such as diabetes  Blood tests are used to check cholesterol levels   Blood tests measure your levels of triglycerides, LDL cholesterol, and HDL cholesterol  How do healthy fats affect my cholesterol levels? Healthy fats, also called unsaturated fats, help lower LDL cholesterol and triglyceride levels  Healthy fats include the following:  • Monounsaturated fats  are found in foods such as olive oil, canola oil, avocado, nuts, and olives  • Polyunsaturated fats,  such as omega 3 fats, are found in fish, such as salmon, trout, and tuna  They can also be found in plant foods such as flaxseed, walnuts, and soybeans  How do unhealthy fats affect my cholesterol levels? Unhealthy fats increase LDL cholesterol and triglyceride levels  They are found in foods high in cholesterol, saturated fat, and trans fat:  • Cholesterol  is found in eggs, dairy, and meat  • Saturated fat  is found in butter, cheese, ice cream, whole milk, and coconut oil  Saturated fat is also found in meat, such as sausage, hot dogs, and bologna  • Trans fat  is found in liquid oils and is used in fried and baked foods  Foods that contain trans fats include chips, crackers, muffins, sweet rolls, microwave popcorn, and cookies  How is high cholesterol treated? Treatment for high cholesterol will also decrease your risk of heart disease, heart attack, and stroke  Treatment may include any of the following:  • Lifestyle changes  may include food, exercise, weight loss, and quitting smoking  You may also need to decrease the amount of alcohol you drink  Your healthcare provider will want you to start with lifestyle changes  Other treatment may be added if lifestyle changes are not enough  Your healthcare provider may recommend you work with a team to manage hyperlipidemia  The team may include medical experts such as a dietitian, an exercise or physical therapist, and a behavior therapist  Your family members may be included in helping you create lifestyle changes      • Medicines  may be given to lower your LDL cholesterol, triglyceride levels, or total cholesterol level  You may need medicines to lower your cholesterol if any of the following is true:    ? You have a history of stroke, TIA, unstable angina, or a heart attack  ? Your LDL cholesterol level is 190 mg/dL or higher  ? You are age 36 to 76 years, have diabetes or heart disease risk factors, and your LDL cholesterol is 70 mg/dL or higher  • Supplements  include fish oil, red yeast rice, and garlic  Fish oil may help lower your triglyceride and LDL cholesterol levels  It may also increase your HDL cholesterol level  Red yeast rice may help decrease your total cholesterol level and LDL cholesterol level  Garlic may help lower your total cholesterol level  Do not take any supplements without talking to your healthcare provider  What food changes can I make to lower my cholesterol levels? A dietitian can help you create a healthy eating plan  He or she can show you how to read food labels and choose foods low in saturated fat, trans fats, and cholesterol  • Decrease the total amount of fat you eat  Choose lean meats, fat-free or 1% fat milk, and low-fat dairy products, such as yogurt and cheese  Try to limit or avoid red meats  Limit or do not eat fried foods or baked goods, such as cookies  • Replace unhealthy fats with healthy fats  Cook foods in olive oil or canola oil  Choose soft margarines that are low in saturated fat and trans fat  Seeds, nuts, and avocados are other examples of healthy fats  • Eat foods with omega-3 fats  Examples include salmon, tuna, mackerel, walnuts, and flaxseed  Eat fish 2 times per week  Pregnant women should not eat fish that have high levels of mercury, such as shark, swordfish, and kumar mackerel  • Increase the amount of high-fiber foods you eat  High-fiber foods can help lower your LDL cholesterol  Aim to get between 20 and 30 grams of fiber each day  Fruits and vegetables are high in fiber   Eat at least 5 servings each day  Other high-fiber foods are whole-grain or whole-wheat breads, pastas, or cereals, and brown rice  Eat 3 ounces of whole-grain foods each day  Increase fiber slowly  You may have abdominal discomfort, bloating, and gas if you add fiber to your diet too quickly  • Eat healthy protein foods  Examples include low-fat dairy products, skinless chicken and turkey, fish, and nuts  • Limit foods and drinks that are high in sugar  Your dietitian or healthcare provider can help you create daily limits for high-sugar foods and drinks  The limit may be lower if you have diabetes or another health condition  Limits can also help you lose weight if needed  What lifestyle changes can I make to lower my cholesterol levels? • Maintain a healthy weight  Ask your healthcare provider what a healthy weight is for you  Ask him or her to help you create a weight loss plan if needed  Weight loss can decrease your total cholesterol and triglyceride levels  Weight loss may also help keep your blood pressure at a healthy level  • Be physically active throughout the day  Physical activity, such as exercise, can help lower your total cholesterol level and maintain a healthy weight  Physical activity can also help increase your HDL cholesterol level  Work with your healthcare provider to create an program that is right for you  Get at least 30 to 40 minutes of moderate physical activity most days of the week  Examples of exercise include brisk walking, swimming, or biking  Also include strength training at least 2 times each week  Your healthcare providers can help you create a physical activity plan  • Do not smoke  Nicotine and other chemicals in cigarettes and cigars can raise your cholesterol levels  Ask your healthcare provider for information if you currently smoke and need help to quit  E-cigarettes or smokeless tobacco still contain nicotine   Talk to your healthcare provider before you use these products  • Limit or do not drink alcohol  Alcohol can increase your triglyceride levels  Ask your healthcare provider before you drink alcohol  Ask how much is okay for you to drink in 24 hours or 1 week  CARE AGREEMENT:   You have the right to help plan your care  Discuss treatment options with your healthcare provider to decide what care you want to receive  You always have the right to refuse treatment  The above information is an  only  It is not intended as medical advice for individual conditions or treatments  Talk to your doctor, nurse or pharmacist before following any medical regimen to see if it is safe and effective for you  © Copyright [employer] 2022 Information is for End User's use only and may not be sold, redistributed or otherwise used for commercial purposes  Osteoarthritis   WHAT YOU NEED TO KNOW:   What is osteoarthritis? Osteoarthritis (OA) occurs when cartilage (tissue that cushions a joint) wears away slowly and causes the bones to rub together  OA is a long-term condition that often affects the hands, neck, lower back, knees, and hips  OA is also called arthrosis or degenerative joint disease  What increases my risk for OA? • Age older than 48 years    • A family history of OA    • Obesity, high blood pressure, or high blood sugar    • A joint injury or infection    • Repetitive movements of your joints at work or during sports    What are the signs and symptoms of OA? • Joint pain that gets worse when you move the joint     • Joint stiffness that decreases after you move the joint     • Decreased range of movement     • Hard, bony enlargement on your fingers or toes    • A grinding or cracking sound when you move your joint    How is OA diagnosed? X-rays are pictures of the bones in your joint  Contrast liquid may be injected into your joint before the x-ray  The liquid will help your joint show up better on the x-ray   Tell the healthcare provider if you have ever had an allergic reaction to contrast liquid  How is OA treated? The goals of treatment are to decrease pain, increase strength, and improve movement  You may need any of the following:  • Medicines:      ? Acetaminophen  decreases pain and fever  It is available without a doctor's order  Ask how much to take and how often to take it  Follow directions  Read the labels of all other medicines you are using to see if they also contain acetaminophen, or ask your doctor or pharmacist  Acetaminophen can cause liver damage if not taken correctly  ? NSAIDs , such as ibuprofen, help decrease swelling, pain, and fever  This medicine is available with or without a doctor's order  NSAIDs can cause stomach bleeding or kidney problems in certain people  If you take blood thinner medicine, always ask your healthcare provider if NSAIDs are safe for you  Always read the medicine label and follow directions  ? Capsaicin cream  may help decrease pain in your joint  ? Prescription pain medicine  may be given  Ask your healthcare provider how to take this medicine safely  Some prescription pain medicines contain acetaminophen  Do not take other medicines that contain acetaminophen without talking to your healthcare provider  Too much acetaminophen may cause liver damage  Prescription pain medicine may cause constipation  Ask your healthcare provider how to prevent or treat constipation  ? A steroid injection  may be given if your symptoms get worse  • Physical therapy  may be used to teach you exercises to help improve movement and strength, and to decrease pain  A physical therapist may move an area with his or her hands  For example, he or she may move your leg in certain ways to treat osteoarthritis in your hip  • Ultrasound  may be used to treat osteoarthritis in certain areas, such as your knee  Ultrasound produces heat that can relieve pain      • Surgery  may be needed if other treatments do not work  How can I manage my symptoms? • Stay active  Physical activity may reduce your pain and improve your ability to do daily activities  Avoid activities that cause pain  Ask your healthcare provider what type of exercise would be best for you  • Maintain a healthy weight  This helps decrease the strain on the joints in your back, hips, knees, ankles, and feet  Ask your healthcare provider what a healthy weight is for you  He or she can help you create a weight loss plan if you are overweight  • Use heat or ice on your joints as directed  Heat and ice help decrease pain, swelling, and muscle spasms  For heat, use a heating pad on a low setting for 20 minutes, or take a warm bath  For ice, use an ice pack, or put crushed ice in a plastic bag  Cover it with a towel before you place it on your joint  Use ice for 15 minutes every hour  • Massage the muscles around the joint  Massage helps relieve pain and stiffness  Your healthcare provider or a physical therapist can show you how to do this  If you have hip OA, another person may need to help you massage the area  • Use a cane, crutches, or a walker if directed  These help protect and relieve pressure on your ankle, knee, and hip joints  You may also be prescribed shoe inserts to decrease pressure in your joints  • Wear flat or low-heeled shoes  This will help decrease pain and reduce pressure on your ankle, knee, and hip joints  When should I call my doctor? • You have severe pain  • You cannot move your joint  • You have a fever  • Your joint is red and tender  • You have questions or concerns about your condition or care  CARE AGREEMENT:   You have the right to help plan your care  Learn about your health condition and how it may be treated  Discuss treatment options with your healthcare providers to decide what care you want to receive  You always have the right to refuse treatment   The above information is an  only  It is not intended as medical advice for individual conditions or treatments  Talk to your doctor, nurse or pharmacist before following any medical regimen to see if it is safe and effective for you  © Copyright Chantel M[employer]oy 2022 Information is for End User's use only and may not be sold, redistributed or otherwise used for commercial purposes  GERD (Gastroesophageal Reflux Disease)   WHAT YOU NEED TO KNOW:   What is gastroesophageal reflux disease (GERD)? GERD is reflux that happens more than 2 times a week for a few weeks  Reflux means acid and food in your stomach back up into your esophagus  GERD can cause other health problems over time if it is not treated  What causes GERD? GERD often happens because the lower muscle (sphincter) of the esophagus does not close properly  The sphincter normally opens to let food into the stomach  It then closes to keep food and stomach acid in the stomach  If the sphincter does not close properly, stomach acid and food back up (reflux) into the esophagus  The following may increase your risk for GERD:  • Certain foods such as spicy foods, chocolate, foods that contain caffeine, peppermint, and fried foods    • Hiatal hernia    • Certain medicines such as calcium channel blockers (used to treat high blood pressure), allergy medicines, sedatives, or antidepressants    • Pregnancy, obesity, or scleroderma    • Lying down after a meal    • Drinking alcohol or smoking cigarettes    What are the signs and symptoms of GERD? • Heartburn (burning pain in your chest)    • Pain after meals that spreads to your neck, jaw, or shoulder    • Pain that gets better when you change positions    • Bitter or acid taste in your mouth    • A dry cough    • Trouble swallowing or pain with swallowing    • Hoarseness or a sore throat    • Burping or hiccups    • Feeling full soon after you start eating    How is GERD diagnosed?   Your healthcare provider will ask about your symptoms and when they started  Tell your provider about other medical conditions you have, your eating habits, and your activities  You may also need any of the following:  • An esophageal pH test  measures the amount of stomach acid that reaches your esophagus  A small probe is used to check the amount  The probe may stay attached to the catheter  If so, the catheter is taped to your nose to hold it in place  Sometimes the probe is wireless, so the catheter is removed after the probe is placed  • An endoscopy  is a procedure used to look at the inside of your esophagus and stomach  An endoscope is a bendable tube with a light and camera on the end  Your healthcare provider may remove a small sample of tissue and send it to a lab for tests  • X-ray  pictures may be taken of your stomach and intestines (bowel)  You may be given a chalky liquid to drink before the pictures are taken  This liquid helps your stomach and intestines show up better on the x-rays  • Pressure and function  tests of your esophagus can help find problems such as a hiatal hernia  How is GERD treated? • Medicines  are used to decrease stomach acid  Medicine may also be used to help your lower esophageal sphincter and stomach contract (tighten) more  • Surgery  is done to wrap the upper part of the stomach around the esophageal sphincter  This will strengthen the sphincter and prevent reflux  How can I manage GERD? • Do not have foods or drinks that may increase heartburn  These include chocolate, peppermint, fried or fatty foods, drinks that contain caffeine, or carbonated drinks (soda)  Other foods include spicy foods, onions, tomatoes, and tomato-based foods  Do not have foods or drinks that can irritate your esophagus, such as citrus fruits, juices, and alcohol  • Do not eat large meals  When you eat a lot of food at one time, your stomach needs more acid to digest it   Eat 6 small meals each day instead of 3 large ones, and eat slowly  Do not eat meals 2 to 3 hours before bedtime  • Elevate the head of your bed  Place 6-inch blocks under the head of your bed frame  You may also use more than one pillow under your head and shoulders while you sleep  • Maintain a healthy weight  If you are overweight, weight loss may help relieve symptoms of GERD  • Do not smoke  Smoking weakens the lower esophageal sphincter and increases the risk of GERD  Ask your healthcare provider for information if you currently smoke and need help to quit  E-cigarettes or smokeless tobacco still contain nicotine  Talk to your healthcare provider before you use these products  • Do not put pressure on your abdomen  Pressure pushes acid up into your esophagus  Do not wear clothing that is tight around your waist  Do not bend over  Bend at the knees if you need to pick something up  Call your local emergency number (911 in the [address] [place] [address],[address]) if:   • You have severe chest pain and sudden trouble breathing  When should I seek immediate care? • You have trouble breathing after you vomit  • You have trouble swallowing, or pain with swallowing  • Your bowel movements are black, bloody, or tarry-looking  • Your vomit looks like coffee grounds or has blood in it  When should I call my doctor? • You feel full and cannot burp or vomit  • You vomit large amounts, or you vomit often  • You are losing weight without trying  • Your symptoms get worse or do not improve with treatment  • You have questions or concerns about your condition or care  CARE AGREEMENT:   You have the right to help plan your care  Learn about your health condition and how it may be treated  Discuss treatment options with your healthcare providers to decide what care you want to receive  You always have the right to refuse treatment  The above information is an  only   It is not intended as medical advice for individual conditions or treatments  Talk to your doctor, nurse or pharmacist before following any medical regimen to see if it is safe and effective for you  © Copyright [employer] 2022 Information is for End User's use only and may not be sold, redistributed or otherwise used for commercial purposes  Prediabetes   WHAT YOU NEED TO KNOW:   What is prediabetes? Prediabetes is a blood glucose (sugar) level that is higher than normal  It is not high enough to be considered diabetes  Prediabetes increases your risk for type 2 diabetes and heart disease  The risk is highest if you have high blood pressure or high cholesterol  What increases my risk for prediabetes? • Being overweight or obese, with a body mass index (BMI) of 25 or higher (23 or higher if you are  American)    • Lack of physical activity    • Older age    • Family history of diabetes (parent or sibling)    • A history of heart disease, gestational diabetes, or polycystic ovary syndrome (PCOS)    • High blood pressure or cholesterol levels    • Being [place] American, , , [place] American, or     • In children, having a mother with diabetes or gestational diabetes mellitus (GDM) during the pregnancy    What are the signs and symptoms of prediabetes? Prediabetes may not cause any symptoms  How is prediabetes diagnosed? Blood tests can help diagnose prediabetes even if no signs or symptoms have started  This is also called screening  Adults who are overweight or obese are usually tested every 3 years, starting at age 28  Your healthcare provider may recommend screening starting at an earlier or later age, depending on your overall risk for diabetes  Children who are at risk for diabetes may be tested  The following may be used to diagnose prediabetes:  • A fasting plasma glucose test  may be done to check your blood sugar level after you have not eaten for 8 hours      • A 2-hour plasma glucose test  starts with a blood sugar level check after you have not eaten for 8 hours  You are then given a glucose drink  Your blood sugar level is checked after 2 hours  • A hemoglobin A1c  is a blood test that measures your average blood sugar level for the past 2 to 3 months  An A1c of 5 7% to 6 4% means you have prediabetes  How do I prevent or delay type 2 diabetes? Healthy choices work best to delay or prevent type 2 diabetes  You may be given the following guidelines from your healthcare provider:  • Get regular physical activity  Adults should get at least 150 minutes (2 5 hours) of moderate physical activity every week  Spread the amount of activity over at least 3 days a week  Do not skip more than 2 days in a row  Children should get at least 60 minutes of moderate physical activity on most days of the week  Examples of moderate physical activity include brisk walking, running, and swimming  Do not sit for longer than 30 minutes at a time  Work with your healthcare provider to create a plan for physical activity  • Lose weight if you are overweight  A weight loss of 7% of your body weight can help  Your healthcare provider can tell you what weight is healthy for you  He or she can help you create a weight loss plan  • Eat healthy foods  Eat a variety of fruits and vegetables  Eat whole-grain foods more often  Choose dairy foods, meat, and other protein foods that are low in fat  Eat fewer sweets, such as candy, cookies, regular soda, and sweetened drinks  You can also decrease calories by eating smaller portion sizes  Work with your healthcare provider or dietitian to develop a meal plan that is right for you  • Take medicine as directed  Your healthcare provider may give diabetes medicine if you are at high risk for diabetes  You may also need medicines for high blood pressure and high cholesterol  • Follow up with your healthcare provider as directed    You will need to return every year to get tested for diabetes  • Do not smoke  Do not use e-cigarettes or smokeless tobacco in place of cigarettes or to help you quit  They still contain nicotine  Ask your healthcare provider for information if you currently smoke and need help quitting  • Start with small goals and work your way up  You may need to start with 3 days of physical activity  You can add a day after 3 weeks or so of activity  Make a goal of losing 5 pounds at first  Talk with healthcare providers about making a plan that is right for you  When should I call my doctor? • You have more hunger or thirst than usual     • You are urinating more often than usual     • You are always exhausted  • You have blurred vision  • You have questions or concerns about your condition or care  CARE AGREEMENT:   You have the right to help plan your care  Learn about your health condition and how it may be treated  Discuss treatment options with your healthcare providers to decide what care you want to receive  You always have the right to refuse treatment  The above information is an  only  It is not intended as medical advice for individual conditions or treatments  Talk to your doctor, nurse or pharmacist before following any medical regimen to see if it is safe and effective for you  © Copyright [facility] 2022 Information is for End User's use only and may not be sold, redistributed or otherwise used for commercial purposes  Type 2 Diabetes Management for Adults   AMBULATORY CARE:   Type 2 diabetes  is a disease that affects how your body uses glucose (sugar)  Either your body cannot make enough insulin, or it cannot use the insulin correctly  It is important to keep diabetes controlled to prevent damage to your heart, blood vessels, and other organs  Management will help you feel well and enjoy your daily activities   Your diabetes care team providers can help you make a plan to fit diabetes care into your schedule  Your plan can change over time to fit your needs and your family's needs  Have someone call your local emergency number (911 in the [address]) if:   • You cannot be woken  • You have signs of diabetic ketoacidosis:     ? confusion, fatigue    ? vomiting    ? rapid heartbeat    ? fruity smelling breath    ? extreme thirst    ? dry mouth and skin    • You have any of the following signs of a heart attack:      ? Squeezing, pressure, or pain in your chest    ? You may  also have any of the following:     - Discomfort or pain in your back, neck, jaw, stomach, or arm    - Shortness of breath    - Nausea or vomiting    - Lightheadedness or a sudden cold sweat    • You have any of the following signs of a stroke:      ? Numbness or drooping on one side of your face     ? Weakness in an arm or leg    ? Confusion or difficulty speaking    ? Dizziness, a severe headache, or vision loss    Call your doctor or diabetes care team provider if:   • You have a sore or wound that will not heal     • You have a change in the amount you urinate  • Your blood sugar levels are higher than your target goals  • You often have lower blood sugar levels than your target goals  • Your skin is red, dry, warm, or swollen  • You have trouble coping with diabetes, or you feel anxious or depressed  • You have questions or concerns about your condition or care  What you need to know about high blood sugar levels:  High blood sugar levels may not cause any symptoms  You may feel more thirsty or urinate more often than usual  Over time, high blood sugar levels can damage your nerves, blood vessels, tissues, and organs   The following can increase your blood sugar levels:  • Large meals or large amounts of carbohydrates at one time    • Less physical activity    • Stress    • Illness    • A lower dose of diabetes medicine or insulin, or a late dose    What you need to know about low blood sugar levels:  Symptoms include feeling shaky, dizzy, irritable, or confused  You can prevent symptoms by keeping your blood sugar levels from going too low  • Treat a low blood sugar level right away:      ? Drink 4 ounces of juice or have 1 tube of glucose gel  ? Check your blood sugar level again 10 to 15 minutes later  ? When the level goes back to normal, eat a meal or snack to prevent another decrease  • Keep glucose gel, raisins, or hard candy with you at all times to treat a low blood sugar level  • Your blood sugar level can get too low if you take diabetes medicine or insulin and do not eat enough food  • If you use insulin, check your blood sugar level before you exercise  ? If your blood sugar level is below 100 mg/dL, eat 4 crackers or 2 ounces of raisins, or drink 4 ounces of juice  ? Check your level every 30 minutes if you exercise longer than 1 hour  ? You may need a snack during or after exercise  What you can do to manage your blood sugar levels:   • Check your blood sugar levels as directed and as needed  Several items are available to use to check your levels  You may need to check by testing a drop of blood in a glucose monitor  You may instead be given a continuous glucose monitoring (CGM) device  The device is worn at all times  The CGM checks your blood sugar level every 5 minutes  It sends results to an electronic device such as a smart phone  A CGM can be used with or without an insulin pump  You and your diabetes care team providers will decide on the best method for you  The goal for blood sugar levels before meals  is between 80 and 130 mg/dL and 2 hours after eating  is lower than 180 mg/dL  • Make healthy food choices  Work with a dietitian to develop a meal plan that works for you and your schedule  A dietitian can help you learn how to eat the right amount of carbohydrates during your meals and snacks   Carbohydrates can raise your blood sugar level if you eat too many at one time  Examples of foods that contain carbohydrates are breads, cereals, rice, pasta, and sweets  • Eat high-fiber foods as directed  Fiber helps improve blood sugar levels  Fiber also lowers your risk for heart disease and other problems diabetes can cause  Examples of high-fiber foods include vegetables, whole-grain bread, and beans such as richardson beans  Your dietitian can tell you how much fiber to have each day  • Get regular physical activity  Physical activity can help you get to your target blood sugar level goal and manage your weight  Get at least 150 minutes of moderate to vigorous aerobic physical activity each week  Do not miss more than 2 days in a row  Do not sit longer than 30 minutes at a time  Your healthcare provider can help you create an activity plan  The plan can include the best activities for you and can help you build your strength and endurance  • Maintain a healthy weight  Ask your team what a healthy weight is for you  A healthy weight can help you control diabetes and prevent heart disease  Ask your team to help you create a weight loss plan, if needed  Weight loss can help make a difference in managing diabetes  Your team will help you set a weight-loss goal, such as 10 to 15 pounds, or 5% of your extra weight  Together you and your team can set manageable weight loss goals  • Take your diabetes medicine or insulin as directed  You may need diabetes medicine, insulin, or both to help control your blood sugar levels  Your healthcare provider will teach you how and when to take your diabetes medicine or insulin  You will also be taught about side effects oral diabetes medicine can cause  Insulin may be injected or given through a pump or pen  You and your providers will decide on the best method for you:    ? An insulin pump  is an implanted device that gives your insulin 24 hours a day   An insulin pump prevents the need for multiple insulin injections in a day  ? An insulin pen  is a device prefilled with the right amount of insulin  ? You and your family members will be taught how to draw up and give insulin  if this is the best method for you  Your providers will also teach you how to dispose of needles and syringes  ? You will learn how much insulin you need  and when to give it  You will be taught when not to give insulin  You will also be taught what to do if your blood sugar level drops too low  This may happen if you take insulin and do not eat the right amount of carbohydrates  More ways to manage type 2 diabetes:   • Wear medical alert identification  Wear medical alert jewelry or carry a card that says you have diabetes  Ask your provider where to get these items  • Do not smoke  Nicotine and other chemicals in cigarettes and cigars can cause lung and blood vessel damage  It also makes it more difficult to manage your diabetes  Ask your provider for information if you currently smoke and need help to quit  Do not use e-cigarettes or smokeless tobacco in place of cigarettes or to help you quit  They still contain nicotine  • Check your feet each day for cuts, scratches, calluses, or other wounds  Look for redness and swelling, and feel for warmth  Wear shoes that fit well  Check your shoes for rocks or other objects that can hurt your feet  Do not walk barefoot or wear shoes without socks  Wear cotton socks to help keep your feet dry  • Ask about vaccines you may need  You have a higher risk for serious illness if you get the flu, pneumonia, COVID-19, or hepatitis  Ask your provider if you should get vaccines to prevent these or other diseases, and when to get the vaccines  • Talk to your provider if you become stressed about diabetes care  Sometimes being able to fit diabetes care into your life can cause increased stress  The stress can cause you not to take care of yourself properly   Your care team providers can help by offering tips about self-care  Your providers may suggest you talk to a mental health provider who can listen and offer help with self-care issues  • Have your A1c checked as directed  Your provider may check your A1c every 3 months, or 2 times each year if your diabetes is controlled  An A1c test shows the average amount of sugar in your blood over the past 2 to 3 months  Your provider will tell you what your A1c level should be  • Have screening tests as directed  Your provider may recommend screening for complications of diabetes and other conditions that may develop  Some screenings may begin right away and some may happen within the first 5 years of diagnosis:    ? Examples of diabetes complications  include kidney problems, high cholesterol, high blood pressure, blood vessel problems, eye problems, and sleep apnea  ? You may be screened for a low vitamin B level  if you take oral diabetes medicine for a long time  ? Women of childbearing years may be screened  for polycystic ovarian syndrome (PCOS)  Follow up with your doctor or diabetes care team providers as directed: You may need to have blood tests done before your follow-up visit  The test results will show if changes need to be made in your treatment or self-care  Talk to your provider if you cannot afford your medicine  Write down your questions so you remember to ask them during your visits  © Copyright [employer] 2022 Information is for End User's use only and may not be sold, redistributed or otherwise used for commercial purposes  The above information is an  only  It is not intended as medical advice for individual conditions or treatments  Talk to your doctor, nurse or pharmacist before following any medical regimen to see if it is safe and effective for you      Type 2 Diabetes in Adults: New Diagnosis   AMBULATORY CARE:   Type 2 diabetes  is a disease that affects how your body uses glucose (sugar)  Normally, when the blood sugar level increases, the pancreas makes more insulin  Insulin helps move sugar out of the blood so it can be used for energy  Type 2 diabetes develops because either the body cannot make enough insulin, or it cannot use the insulin correctly  Type 2 diabetes can be controlled to prevent damage to your heart, blood vessels, and other organs  Common symptoms include the following:   • Numbness in your fingers or toes    • Blurred vision    • Urinating often    • More hunger or thirst than usual    Have someone call your local emergency number (911 in the [address] [facility]) if:   • You have any of the following signs of a stroke:      ? Numbness or drooping on one side of your face     ? Weakness in an arm or leg    ? Confusion or difficulty speaking    ? Dizziness, a severe headache, or vision loss    • You have any of the following signs of a heart attack:      ? Squeezing, pressure, or pain in your chest    ? You may  also have any of the following:     - Discomfort or pain in your back, neck, jaw, stomach, or arm    - Shortness of breath    - Nausea or vomiting    - Lightheadedness or a sudden cold sweat    • You have trouble breathing  Seek care immediately if:   • You have severe abdominal pain  • You vomit for more than 2 hours  • You have trouble staying awake or focusing  • You are shaking or sweating  • You feel weak or more tired than usual     • You have blurred or double vision  • Your breath has a fruity, sweet smell  Call your doctor or diabetes care team provider if:   • Your arms and legs are swollen  • You have an upset stomach and cannot eat the foods on your meal plan  • You feel dizzy, have headaches, or are easily irritated  • Your skin is red, warm, dry, or swollen  • You have a wound that does not heal     • You have numbness in your arms or legs      • You have trouble coping with your illness, or you feel anxious or depressed  • You have questions or concerns about your condition or care  Treatment for type 2 diabetes  helps prevent or delay complications, including heart and kidney disease  You must eat healthy meals and do regular physical activity  You may  need any of the following:  • Diabetes medicines or insulin  may be given to decrease the amount of sugar in your blood  • Blood pressure medicine  may be given to lower your blood pressure  • Cholesterol-lowering medicine  may be given to prevent heart disease  • Antiplatelets , such as aspirin, help prevent blood clots  Take your antiplatelet medicine exactly as directed  These medicines make it more likely for you to bleed or bruise  If you are told to take aspirin, do not take acetaminophen or ibuprofen instead  • Take your medicine as directed  Contact your healthcare provider if you think your medicine is not helping or if you have side effects  Tell your provider if you are allergic to any medicine  Keep a list of the medicines, vitamins, and herbs you take  Include the amounts, and when and why you take them  Bring the list or the pill bottles to follow-up visits  Carry your medicine list with you in case of an emergency  Tests  may be used to check for type 2 diabetes starting at age 28  Any of the following may be used to diagnose diabetes or check that it is well controlled:  • An A1c test  shows the average amount of sugar in your blood over the past 2 to 3 months  Your diabetes care team provider will tell you the A1c level that is right for you  • A fasting plasma glucose test  is when your blood sugar level is tested after you have not eaten for 8 hours  • A 2-hour plasma glucose test  starts with a blood sugar level check after you have not eaten for 8 hours  You are then given a glucose drink  Your blood sugar level is checked after 2 hours      • A random glucose test  may be done any time of day, no matter how long ago you ate     Diabetes education:  Diabetes education will start right away  Diabetes education may also happen later to refresh your memory  Your diabetes care team may include physicians, nurse practitioners, and physician assistants  It may also include nurses, dietitians, exercise specialists, pharmacists, dentists, and podiatrists  Family members, or others who are close to you, may also be part of the team  You and your team will make goals and plans to manage diabetes and other health problems  The plans and goals will be specific to your needs  Members of your diabetes care team will teach you the following:  • About nutrition:  A dietitian will help you make a meal plan to keep your blood sugar level steady  You will learn how food affects your blood sugar levels  You will also learn to keep track of sugar and starchy foods (carbohydrates)  Do not skip meals  Your blood sugar level may drop too low if you have taken diabetes medicine and do not eat  You may be taught to use the plate method for portion control  With the plate method, ½ of your plate contains vegetables  The other half is divided so that ¼ contains protein or meat, and ¼ contains starches, such as potatoes  • About physical activity and diabetes: You will learn why physical activity, such as walking, is important  You and your care team provider will make a plan for your activity  Your care team provider will tell you what a healthy weight is for you  He or she will help you make a plan to get to that weight and stay there  Maintain a healthy weight to help delay or prevent complications of diabetes  • About your blood sugar level: You will learn what your blood sugar level should be  You will be given information on when and how to check your blood sugar level  You may need to check by testing a drop of blood in a glucose monitor  You may instead be given a continuous glucose monitoring (CGM) device   A sensor is placed in your abdomen or on your arm  You put a transmitter on the sensor to get a reading that shows up on the monitor  You will learn what to do if your level is too high or too low  Write down the times of your checks and your levels  Take them to all follow-up appointments  • About diabetes medicine: You may need oral diabetes medicine, insulin, or both to help control your blood sugar levels  Your healthcare provider will teach you how and when to take oral diabetes medicine  You will also be taught about side effects oral diabetes medicine can cause  Insulin may be added if oral diabetes medicine becomes less effective over time  Insulin may be injected, or given through a pump or pen  You and your care team will discuss which method is best for you  ? An insulin pump  is an implanted device that gives your insulin 24 hours a day  An insulin pump prevents the need for multiple insulin injections in a day  ? An insulin pen  is a device prefilled with the right amount of insulin  ? You and your family members will be taught how to draw up and give insulin  if this is the best method for you  Your education team will also teach you how to dispose of needles and syringes  ? You will learn how much insulin you need  and when to give it  You will be taught when not to give insulin  You will also be taught what to do if your blood sugar level drops too low  This may happen if you take insulin and do not eat the right amount of carbohydrates  Other ways to help manage type 2 diabetes:   • Talk to your care team if you have increased stress about your diagnosis  Stress about your diagnosis can keep you from taking care of yourself properly  Your care team can help by offering tips about self-care  Your care team may suggest you talk to a mental health provider  The provider can listen and offer help with self-care issues  • Check your feet each day for sores    Wear shoes and socks that fit correctly  Do not trim your toenails  Go to a podiatrist  Ask your care team for more information about foot care  • Do not smoke  Nicotine and other chemicals in cigarettes and cigars can cause lung damage and make diabetes harder to manage  Ask your care team provider for information if you currently smoke and need help to quit  E-cigarettes or smokeless tobacco still contain nicotine  Talk to your care team provider before you use these products  • Drink water instead of sugary drinks such as soft drinks and fruit juices  Sugary drinks increase your blood sugar level and weight  Your healthcare provider may tell you that diet drinks do not help with weight loss  • Know the risks if you choose to drink alcohol  Alcohol can cause your blood sugar levels to be low if you use insulin  Alcohol can cause high blood sugar levels and weight gain if you drink too much  A drink of alcohol is 12 ounces of beer, 5 ounces of wine, or 1½ ounces of liquor  Your care team can tell you how many drinks are okay to have in 24 hours and in 1 week  • Check your blood pressure as directed  If you have high blood pressure (BP), talk to your care team about your BP goals  Together you can create a plan to lower your BP if needed and keep it in a healthy range  The plan may include lifestyle changes or medicines  A normal BP is 119/79 or lower  A normal blood pressure can help prevent or delay certain complications from diabetes  Examples include retinopathy (eye damage) and kidney damage  • Wear medical alert identification  Wear medical alert jewelry or carry a card that says you have diabetes  Ask your care team provider where to get these items  • Ask about vaccines you may need  You have a higher risk for serious illness if you get the flu, pneumonia, COVID-19, or hepatitis   Ask your provider if you should get vaccines to prevent these or other diseases, and when to get the vaccines  Risks of type 2 diabetes: It is important to learn to keep your diabetes in control  Uncontrolled diabetes can damage your nerves, veins, and arteries  Your risk for dementia increases faster the longer your diabetes is not controlled  High blood sugar levels may damage other body tissues and organs over time  Damage to arteries may increase your risk for heart attack and stroke  Nerve damage may also lead to other heart, stomach, and nerve problems  Diabetes can become life-threatening if it is not controlled  Follow up with your care team providers as directed: You may need to return to have your A1c checked every 3 months  You will need to have your feet checked during at least 1 visit each year  You will need an eye exam 1 time each year to check for retinopathy  You will also need tests to check for kidney or heart disease, and high blood pressure  Write down your questions so you remember to ask them during your visits  © Copyright [facility] 2022 Information is for End User's use only and may not be sold, redistributed or otherwise used for commercial purposes  The above information is an  only  It is not intended as medical advice for individual conditions or treatments  Talk to your doctor, nurse or pharmacist before following any medical regimen to see if it is safe and effective for you  Basic Carbohydrate Counting   AMBULATORY CARE:   Carbohydrate counting  is a way to plan your meals by counting the amount of carbohydrate in foods  Carbohydrates are the sugars, starches, and fiber found in fruit, grains, vegetables, and milk products  Carbohydrates increase your blood sugar levels  Carbohydrate counting can help you eat the right amount of carbohydrate to keep your blood sugar levels under control     What you need to know about planning meals using carbohydrate counting:  • A dietitian or healthcare provider will help you develop a healthy meal plan that works best for you  You will be taught how much carbohydrate to eat or drink for each meal and snack  Your meal plan will be based on your age, weight, usual food intake, and physical activity level  If you have diabetes, it will also include your blood sugar levels and diabetes medicine  Once you know how much carbohydrate you should eat, you can decide what type of food you want to eat  • You will need to know what foods contain carbohydrate and how much they contain  Keep track of the amount of carbohydrate in meals and snacks in order to follow your meal plan  Do not avoid carbohydrates or skip meals  Your blood sugar may fall too low if you do not eat enough carbohydrate or you skip meals  Foods that contain carbohydrate:   • Breads:  Each serving of food listed below contains about 15 g of carbohydrate   ? 1 slice of bread (1 ounce) or 1 flour or corn tortilla (6 inch)    ? ½ of a hamburger bun or ¼ of a large bagel (about 1 ounce)    ? 1 pancake (about 4 inches across and ¼ inch thick)    • Cereals and grains:  Serving sizes of ready-to-eat cereals vary  Look at the serving size and the total carbohydrate amount listed on the food label  Each serving of food listed below contains about 15 g of carbohydrate   ? ¾ cup of dry, unsweetened, ready-to-eat cereal or ¼ cup of low-fat granola     ? ½ cup of oatmeal or other cooked cereal     ? ? cup of cooked rice or pasta    • Starchy vegetables and beans:  Each serving of food listed below contains about 15 g of carbohydrate   ? ½ cup of corn, green peas, sweet potatoes, or mashed potatoes    ? ¼ of a large baked potato    ? ½ cup of beans, lentils, and peas (garbanzo, richardson, kidney, white, split, black-eyed)    • Crackers and snacks:  Each serving of food listed below contains about 15 g of carbohydrate   ? 3 inga cracker squares or 8 animal crackers     ? 6 saltine-type crackers    ?  3 cups of popcorn or ¾ ounce of pretzels, potato chips, or tortilla chips    • Fruit:  Each serving of food listed below contains about 15 g of carbohydrate   ? 1 small (4 ounce) piece of fresh fruit or ¾ to 1 cup of fresh fruit    ? ½ cup of canned or frozen fruit, packed in natural juice    ? ½ cup (4 ounces) of unsweetened fruit juice    ? 2 tablespoons of dried fruit    • Desserts or sugary foods:  Each serving of food listed below contains about 15 g of carbohydrate   ? 2-inch square unfrosted cake or brownie     ? 2 small cookies    ? ½ cup of ice cream, frozen yogurt, or nondairy frozen yogurt    ? ¼ cup of sherbet or sorbet    ? 1 tablespoon of regular syrup, jam, or jelly    ? 2 tablespoons of light syrup    • Milk and yogurt:  Foods from the milk group contain about 12 g of carbohydrate per serving  ? 1 cup of fat-free or low-fat milk    ? 1 cup of soy milk    ? ? cup of fat-free, yogurt sweetened with artificial sweetener    • Non-starchy vegetables:  Each serving contains about 5 g of carbohydrate   Three servings of non-starch vegetables count as 1 carbohydrate serving  ? ½ cup of cooked vegetables or 1 cup of raw vegetables  This includes beets, broccoli, cabbage, cauliflower, cucumber, mushrooms, tomatoes, and zucchini    ? ½ cup of vegetable juice    How to use carbohydrate counting to plan meals:   • Count carbohydrate amounts using serving sizes:      ? Pasta dinner example: You plan to have pasta, tossed salad, and an 8-ounce glass of milk  Your healthcare provider tells you that you may have 4 carbohydrate servings for dinner  One carbohydrate serving of pasta is ? cup  One cup of pasta will equal 3 carbohydrate servings  An 8-ounce glass of milk will count as 1 carbohydrate serving  These amounts of food would equal 4 carbohydrate servings  One cup of tossed salad does not count toward your carbohydrate servings         • Count carbohydrate amounts using food labels:  Find the total amount of carbohydrate in a packaged food by reading the food label  Food labels tell you the serving size of the food and the total carbohydrate amount in each serving  Find the serving size on the food label and then decide how many servings you will eat  Multiply the number of servings you plan to eat by the carbohydrate amount per serving  ? Granola bar snack example: Your meal plan allows you to have 2 carbohydrate servings (30 grams) of carbohydrate for a snack  You plan to eat 1 package of granola bars, which contains 2 bars  According to the food label, the serving size of food in this package is 1 bar  Each serving (1 bar) contains 25 grams of carbohydrate  The total amount of carbohydrate in this package of granola bars would be 50 g  Based on your meal plan, you should eat only 1 bar  Follow up with your doctor as directed:  Write down your questions so you remember to ask them during your visits  © Copyright Tyna Leyden 2022 Information is for End User's use only and may not be sold, redistributed or otherwise used for commercial purposes  The above information is an  only  It is not intended as medical advice for individual conditions or treatments  Talk to your doctor, nurse or pharmacist before following any medical regimen to see if it is safe and effective for you  Foot Care for People with Diabetes   AMBULATORY CARE:   What you need to know about foot care:  Long-term high blood sugar levels can damage the blood vessels and nerves in your legs and feet  This damage makes it hard to feel pressure, pain, temperature, and touch  You may not be able to feel a cut or sore, or shoes that are too tight  Foot care is needed to prevent serious problems, such as an infection or amputation  Diabetes may cause your toes to become crooked or curved under  These changes may affect the way you walk and can lead to increased pressure on your foot  The pressure can decrease blood flow to your feet   Lack of blood flow increases your risk for a foot ulcer  Call your care team provider if:   • Your feet become numb, weak, or hard to move  • You have pus draining from a sore on your foot  • You have a wound on your foot that gets bigger, deeper, or does not heal     • You see blisters, cuts, scratches, calluses, or sores on your foot  • You have a fever, and your feet become red, warm, and swollen  • Your toenails become thick, curled, or yellow  • You find it hard to check your feet because your vision is poor  • You have questions or concerns about your condition or care  How to care for your feet:   • Check your feet each day  Look at your whole foot, including the bottom, and between and under your toes  Check for wounds, corns, and calluses  Use a mirror to see the bottom of your feet  The skin on your feet may be shiny, tight, or darker than normal  Your feet may also be cold and pale  Feel your feet by running your hands along the tops, bottoms, sides, and between your toes  Redness, swelling, and warmth are signs of blood flow problems that can lead to a foot ulcer  Do not try to remove corns or calluses yourself  Do not ignore small problems, such as dry skin or small wounds  These can become life-threatening over time without proper care  • Wash your feet each day with soap and warm water  Do not use hot water, because this can injure your foot  Dry your feet gently with a towel after you wash them  Dry between and under your toes  • Apply lotion or a moisturizer on your dry feet  Ask your care team provider what lotions are best to use  Do not put lotion or moisturizer between your toes  Moisture between your toes could lead to skin breakdown  • Cut your toenails correctly  File or cut your toenails straight across  Use a soft brush to clean around your toenails  If your toenails are very thick, you may need to have a care team provider or specialist cut them  • Protect your feet    Do not walk barefoot or wear your shoes without socks  Check your shoes for rocks or other objects that can hurt your feet  Wear cotton socks to help keep your feet dry  Wear socks without toe seams, or wear them with the seams inside out  Change your socks each day  Do not wear socks that are dirty or damp  • Wear shoes that fit well  Wear shoes that do not rub against any area of your feet  Your shoes should be ½ to ¾ inch (1 to 2 centimeters) longer than your feet  Your shoes should also have extra space around the widest part of your feet  Walking or athletic shoes with laces or straps that adjust are best  Ask your care team provider for help to choose shoes that fit you best  Ask your provider if you need to wear an insert, orthotic, or bandage on your feet  • Go to your follow-up visits  Your care team provider will do a foot exam at least 1 time each year  You may need a foot exam more often if you have nerve damage, foot deformities, or ulcers  Your provider will check for nerve damage and how well you can feel your feet  Your provider will check your shoes to see if they fit well  • Do not smoke  Smoking can damage your blood vessels and put you at increased risk for foot ulcers  Ask your care team provider for information if you currently smoke and need help to quit  E-cigarettes or smokeless tobacco still contain nicotine  Talk to your care team provider before you use these products  Follow up with your diabetes care team provider or foot specialist as directed: You will need to have your feet checked at least 1 time each year  You may need a foot exam more often if you have nerve damage, foot deformities, or ulcers  Write down your questions so you remember to ask them during your visits  © Copyright Mayur Valle 2022 Information is for End User's use only and may not be sold, redistributed or otherwise used for commercial purposes  The above information is an  only   It is not intended as medical advice for individual conditions or treatments  Talk to your doctor, nurse or pharmacist before following any medical regimen to see if it is safe and effective for you  What to Do if Your Blood Sugar is Low   AMBULATORY CARE:   Low blood sugar levels  (hypoglycemia) can happen with Type 1 and Type 2 diabetes  Low levels are more likely to happen if you use insulin  Hypoglycemia can cause you to have falls, accidents, and injuries  A blood sugar level that gets too low can lead to seizures, coma, and death  Learn to recognize the symptoms early so you can get treatment quickly  When your blood sugar is low you may feel:  • Sweaty    • Nervous or shaky    • Anxious or irritable    • Confused    • A fast, pounding heartbeat    • Extremely hungry    Have someone call your local emergency number (911 in the [address] [facility]) if:   • You cannot be woken  • You have a seizure  Call your doctor if:   • You have symptoms of a low blood sugar level, such as trouble thinking, sweating, or a pounding heartbeat  • Your blood sugar level is lower than normal and it does not improve with treatment  • You often have lower blood sugar levels than your target goals  • You have trouble coping with your illness, or you feel anxious or depressed  • You have questions or concerns about your condition or care  What to do if you have symptoms of low blood sugar:   • Check your blood sugar level, if possible  Your blood sugar level is too low if it is at or below 70 mg/dL  • Eat or drink 15 grams of fast-acting carbohydrate  Fast-acting carbohydrates will raise your blood sugar level quickly  Examples of 15 grams of fast-acting carbohydrates:     ? 4 ounces (½ cup) of fruit juice     ? 4 ounces of regular soda    ? 2 tablespoons of raisins     ? 1 tube of glucose gel or 3 to 4 glucose tablets       • Check your blood sugar level 15 minutes later    If the level is still low (less than 100 mg/dL), eat another 15 grams of carbohydrate  When the level returns to 100 mg/dL, eat a snack or meal that contains carbohydrates  This will help prevent another drop in blood sugar  • Teach people close to you how to use your glucagon kit  Your blood sugar may be too low for you to be awake  People need to know when and how to use your kit  Prevent low blood sugar levels:  Prevent low blood sugar by knowing what increases your risk  Ask your healthcare provider for ways to prevent low blood sugar levels  Any of the following can increase your risk of low blood sugar:  • Fasting for tests or procedures    • During or after intense exercise    • Late or postponed meals    • Sleeping (you may need a bedtime snack)     • Drinking alcohol if you use insulin or insulin releasing pills    Follow up with your doctor as directed:  Write down your questions so you remember to ask them during your visits  © Copyright [employer] 2022 Information is for End User's use only and may not be sold, redistributed or otherwise used for commercial purposes  The above information is an  only  It is not intended as medical advice for individual conditions or treatments  Talk to your doctor, nurse or pharmacist before following any medical regimen to see if it is safe and effective for you

## 2023-05-15 ENCOUNTER — APPOINTMENT (OUTPATIENT)
Dept: LAB | Facility: MEDICAL CENTER | Age: 63
End: 2023-05-15

## 2023-05-15 DIAGNOSIS — Z12.5 SCREENING FOR PROSTATE CANCER: ICD-10-CM

## 2023-05-15 DIAGNOSIS — I10 ESSENTIAL HYPERTENSION: ICD-10-CM

## 2023-05-15 LAB
ALBUMIN SERPL BCP-MCNC: 3.3 G/DL (ref 3.5–5)
ALP SERPL-CCNC: 58 U/L (ref 46–116)
ALT SERPL W P-5'-P-CCNC: 29 U/L (ref 12–78)
ANION GAP SERPL CALCULATED.3IONS-SCNC: 3 MMOL/L (ref 4–13)
AST SERPL W P-5'-P-CCNC: 34 U/L (ref 5–45)
BACTERIA UR QL AUTO: ABNORMAL /HPF
BASOPHILS # BLD AUTO: 0.05 THOUSANDS/ÂΜL (ref 0–0.1)
BASOPHILS NFR BLD AUTO: 1 % (ref 0–1)
BILIRUB SERPL-MCNC: 0.37 MG/DL (ref 0.2–1)
BILIRUB UR QL STRIP: NEGATIVE
BUN SERPL-MCNC: 23 MG/DL (ref 5–25)
CALCIUM ALBUM COR SERPL-MCNC: 9.5 MG/DL (ref 8.3–10.1)
CALCIUM SERPL-MCNC: 8.9 MG/DL (ref 8.3–10.1)
CHLORIDE SERPL-SCNC: 108 MMOL/L (ref 96–108)
CHOLEST SERPL-MCNC: 155 MG/DL
CLARITY UR: CLEAR
CO2 SERPL-SCNC: 25 MMOL/L (ref 21–32)
COLOR UR: ABNORMAL
CREAT SERPL-MCNC: 1.4 MG/DL (ref 0.6–1.3)
EOSINOPHIL # BLD AUTO: 0.1 THOUSAND/ÂΜL (ref 0–0.61)
EOSINOPHIL NFR BLD AUTO: 2 % (ref 0–6)
ERYTHROCYTE [DISTWIDTH] IN BLOOD BY AUTOMATED COUNT: 15.4 % (ref 11.6–15.1)
GFR SERPL CREATININE-BSD FRML MDRD: 53 ML/MIN/1.73SQ M
GLUCOSE P FAST SERPL-MCNC: 113 MG/DL (ref 65–99)
GLUCOSE UR STRIP-MCNC: NEGATIVE MG/DL
HCT VFR BLD AUTO: 49.4 % (ref 36.5–49.3)
HDLC SERPL-MCNC: 37 MG/DL
HGB BLD-MCNC: 15.2 G/DL (ref 12–17)
HGB UR QL STRIP.AUTO: NEGATIVE
IMM GRANULOCYTES # BLD AUTO: 0.02 THOUSAND/UL (ref 0–0.2)
IMM GRANULOCYTES NFR BLD AUTO: 0 % (ref 0–2)
KETONES UR STRIP-MCNC: NEGATIVE MG/DL
LDLC SERPL CALC-MCNC: 85 MG/DL (ref 0–100)
LEUKOCYTE ESTERASE UR QL STRIP: NEGATIVE
LYMPHOCYTES # BLD AUTO: 2.29 THOUSANDS/ÂΜL (ref 0.6–4.47)
LYMPHOCYTES NFR BLD AUTO: 41 % (ref 14–44)
MCH RBC QN AUTO: 29 PG (ref 26.8–34.3)
MCHC RBC AUTO-ENTMCNC: 30.8 G/DL (ref 31.4–37.4)
MCV RBC AUTO: 94 FL (ref 82–98)
MONOCYTES # BLD AUTO: 0.44 THOUSAND/ÂΜL (ref 0.17–1.22)
MONOCYTES NFR BLD AUTO: 8 % (ref 4–12)
NEUTROPHILS # BLD AUTO: 2.74 THOUSANDS/ÂΜL (ref 1.85–7.62)
NEUTS SEG NFR BLD AUTO: 48 % (ref 43–75)
NITRITE UR QL STRIP: NEGATIVE
NON-SQ EPI CELLS URNS QL MICRO: ABNORMAL /HPF
NONHDLC SERPL-MCNC: 118 MG/DL
NRBC BLD AUTO-RTO: 0 /100 WBCS
PH UR STRIP.AUTO: 5.5 [PH]
PLATELET # BLD AUTO: 234 THOUSANDS/UL (ref 149–390)
PMV BLD AUTO: 9.6 FL (ref 8.9–12.7)
POTASSIUM SERPL-SCNC: 4.9 MMOL/L (ref 3.5–5.3)
PROT SERPL-MCNC: 7.9 G/DL (ref 6.4–8.4)
PROT UR STRIP-MCNC: ABNORMAL MG/DL
PSA SERPL-MCNC: 0.7 NG/ML (ref 0–4)
RBC # BLD AUTO: 5.25 MILLION/UL (ref 3.88–5.62)
RBC #/AREA URNS AUTO: ABNORMAL /HPF
SODIUM SERPL-SCNC: 136 MMOL/L (ref 135–147)
SP GR UR STRIP.AUTO: 1.02 (ref 1–1.03)
TRIGL SERPL-MCNC: 165 MG/DL
TSH SERPL DL<=0.05 MIU/L-ACNC: 0.72 UIU/ML (ref 0.45–4.5)
UROBILINOGEN UR STRIP-ACNC: <2 MG/DL
WBC # BLD AUTO: 5.64 THOUSAND/UL (ref 4.31–10.16)
WBC #/AREA URNS AUTO: ABNORMAL /HPF

## 2023-05-16 ENCOUNTER — OFFICE VISIT (OUTPATIENT)
Dept: FAMILY MEDICINE CLINIC | Facility: CLINIC | Age: 63
End: 2023-05-16

## 2023-05-16 VITALS
RESPIRATION RATE: 16 BRPM | HEART RATE: 66 BPM | SYSTOLIC BLOOD PRESSURE: 110 MMHG | DIASTOLIC BLOOD PRESSURE: 70 MMHG | WEIGHT: 303.6 LBS | OXYGEN SATURATION: 98 % | TEMPERATURE: 98.6 F | BODY MASS INDEX: 44.97 KG/M2 | HEIGHT: 69 IN

## 2023-05-16 DIAGNOSIS — R73.03 PRE-DIABETES: ICD-10-CM

## 2023-05-16 DIAGNOSIS — G47.33 OSA (OBSTRUCTIVE SLEEP APNEA): ICD-10-CM

## 2023-05-16 DIAGNOSIS — E66.01 CLASS 3 SEVERE OBESITY DUE TO EXCESS CALORIES WITH SERIOUS COMORBIDITY AND BODY MASS INDEX (BMI) OF 45.0 TO 49.9 IN ADULT (HCC): ICD-10-CM

## 2023-05-16 DIAGNOSIS — B35.3 TINEA PEDIS OF LEFT FOOT: ICD-10-CM

## 2023-05-16 DIAGNOSIS — K21.9 GASTROESOPHAGEAL REFLUX DISEASE WITHOUT ESOPHAGITIS: ICD-10-CM

## 2023-05-16 DIAGNOSIS — M17.0 PRIMARY OSTEOARTHRITIS OF BOTH KNEES: ICD-10-CM

## 2023-05-16 DIAGNOSIS — E78.2 MIXED HYPERLIPIDEMIA: ICD-10-CM

## 2023-05-16 DIAGNOSIS — N18.31 STAGE 3A CHRONIC KIDNEY DISEASE (HCC): ICD-10-CM

## 2023-05-16 DIAGNOSIS — I10 ESSENTIAL HYPERTENSION: Primary | ICD-10-CM

## 2023-05-16 DIAGNOSIS — J30.2 SEASONAL ALLERGIES: ICD-10-CM

## 2023-05-16 DIAGNOSIS — F32.0 CURRENT MILD EPISODE OF MAJOR DEPRESSIVE DISORDER WITHOUT PRIOR EPISODE (HCC): ICD-10-CM

## 2023-05-16 LAB — SL AMB POCT HEMOGLOBIN AIC: 6.6 (ref ?–6.5)

## 2023-05-16 RX ORDER — LORATADINE 10 MG/1
10 TABLET ORAL DAILY
Qty: 30 TABLET | Refills: 2 | Status: SHIPPED | OUTPATIENT
Start: 2023-05-16

## 2023-05-16 NOTE — ASSESSMENT & PLAN NOTE
Prediabetes noted  Patient to maintain a low carb, low starch and low sugar diet  Check hemoglobin A1c   6 6%  Patient started on metformin 850 mg p o  twice daily

## 2023-05-16 NOTE — ASSESSMENT & PLAN NOTE
BMI 44 83 kg/m2  Weigh decreased since last visit  Patient counseled on proper diet, exercise and nutrition  Recheck next office visit

## 2023-05-16 NOTE — ASSESSMENT & PLAN NOTE
Patient has a history of hyperlipidemia, maintain on simvastatin 40mg p o daily  To maintain a ow cholesterol, low fat diet

## 2023-05-16 NOTE — ASSESSMENT & PLAN NOTE
Patient has chronic osteoarthritis of bilateral knees, with right knee prior orthoscopic evaluation

## 2023-05-16 NOTE — ASSESSMENT & PLAN NOTE
Repeat currently 110/70  Patient maintained on lisinopril 20 mg p o  daily, trouble tartrate 50 mg p o  daily

## 2023-05-23 ENCOUNTER — VBI (OUTPATIENT)
Dept: ADMINISTRATIVE | Facility: OTHER | Age: 63
End: 2023-05-23

## 2023-05-27 DIAGNOSIS — G25.81 RLS (RESTLESS LEGS SYNDROME): ICD-10-CM

## 2023-05-30 RX ORDER — ROPINIROLE 1 MG/1
TABLET, FILM COATED ORAL
Qty: 90 TABLET | Refills: 1 | Status: SHIPPED | OUTPATIENT
Start: 2023-05-30

## 2023-06-04 ENCOUNTER — HOSPITAL ENCOUNTER (EMERGENCY)
Facility: HOSPITAL | Age: 63
Discharge: HOME | End: 2023-06-04
Attending: EMERGENCY MEDICINE | Admitting: EMERGENCY MEDICINE
Payer: COMMERCIAL

## 2023-06-04 VITALS
SYSTOLIC BLOOD PRESSURE: 160 MMHG | WEIGHT: 305 LBS | RESPIRATION RATE: 18 BRPM | OXYGEN SATURATION: 98 % | TEMPERATURE: 98.6 F | HEIGHT: 69 IN | HEART RATE: 70 BPM | DIASTOLIC BLOOD PRESSURE: 70 MMHG | BODY MASS INDEX: 45.18 KG/M2

## 2023-06-04 DIAGNOSIS — L03.115 CELLULITIS OF RIGHT LOWER EXTREMITY: Primary | ICD-10-CM

## 2023-06-04 PROCEDURE — 99281 EMR DPT VST MAYX REQ PHY/QHP: CPT

## 2023-06-04 RX ORDER — DOXYCYCLINE HYCLATE 100 MG/1
100 CAPSULE ORAL ONCE
Status: COMPLETED | OUTPATIENT
Start: 2023-06-04 | End: 2023-06-04

## 2023-06-04 RX ORDER — ACETAMINOPHEN 325 MG/1
650 TABLET ORAL ONCE
Status: COMPLETED | OUTPATIENT
Start: 2023-06-04 | End: 2023-06-04

## 2023-06-04 RX ORDER — DOXYCYCLINE HYCLATE 100 MG/1
100 CAPSULE ORAL 2 TIMES DAILY
Qty: 13 CAPSULE | Refills: 0 | Status: SHIPPED | OUTPATIENT
Start: 2023-06-04 | End: 2023-06-11

## 2023-06-04 RX ADMIN — ACETAMINOPHEN 650 MG: 325 TABLET ORAL at 23:03

## 2023-06-04 RX ADMIN — DOXYCYCLINE 100 MG: 100 CAPSULE ORAL at 23:03

## 2023-06-05 ENCOUNTER — VBI (OUTPATIENT)
Dept: ADMINISTRATIVE | Facility: OTHER | Age: 63
End: 2023-06-05

## 2023-06-05 NOTE — TELEPHONE ENCOUNTER
[facility]    ED Visit Information     Ed visit date: 6/4/23  Diagnosis Description:   Cellulitis of right lower extremity   In Network? Yes Pari Hart  Discharge status: Home  Discharged with meds ? Yes  Number of ED visits to date: 1  ED Severity:N/A     Outreach Information    Outreach successful: Yes 1  Date letter mailed:n/a  Date Finalized:6/5/23    Care Coordination    Follow up appointment with pcp: no patient will call when ready  Transportation issues ?  No    Value Consolidated Santhosh

## 2023-06-05 NOTE — DISCHARGE INSTRUCTIONS
DIAGNOSIS; RIGHT LOWER LEG SOFT TISSUE INFECTION     - FOR PAIN- OVER THE COUNTER GENERIC ACETAMINOPHEN 500 MG EVERY 4 HRS     - WASH AREA DAILY WITH SOAP AND WATER     - FOR NEXT WEEK-- FOR AT LEAST 30 MINUTES A DAY PLEASE APPLY WARM SOAPY RAG TO AREA  AND  SOAK AREA    - ANTIBIOTIC- DOXYCYCLINE-- 1 TABLET 2 TIMES A DAY FOR 7 DAYS-- NOTE TAKE WITH MEALS--  IF GO OUT IN SUN ON THIS MEDICATION MUST USE SUNBLOCK-= COMPLETE UVA AND UVB  PROTECTION -     - PLEASE RETURN TO   THE ER FOR ANY FEVERS- TEMP > 100 4/ ANY SHAKING CHILLS- ANY INCREASING REDNESS/SWELLING OR AREA  ANY PUS DRAINAGE OR ANY REDNESS SPREADING UP LEG/THIGH DESPITE BEING ON CHILO ANTIBIOTICS FOR 3-4 DAYS    - AT TIMES THIS CAN DEVELOP INTO AN ABSCESS- WHICH  WILL NEED TO BE DRAINED

## 2023-06-08 NOTE — ED PROVIDER NOTES
History  Chief Complaint   Patient presents with   • Insect Bite     Insect bite 3-4 days ago  Right shin     62 YR MALE WITH  APPROX 4 DAYS OF  ENLARGING SKIN LESION OF RLE-- STARTED AS PIMPLE LIKE-- NO SKIN TRAUMA- INJURY - NO FEVER- CHILLS/ SYSTEMIC COMPS- NO HX OF CA MRSA      History provided by:  Patient   used: No    Insect Bite  Time since incident:  4 days  Pain details:     Quality:  Aching      Prior to Admission Medications   Prescriptions Last Dose Informant Patient Reported? Taking?   aspirin (ECOTRIN LOW STRENGTH) 81 mg EC tablet   Yes No   Sig: Take 81 mg by mouth daily   lisinopril (ZESTRIL) 20 mg tablet   No No   Sig: TAKE 1 TABLET BY MOUTH EVERY DAY IN THE MORNING   loratadine (CLARITIN) 10 mg tablet   No No   Sig: Take 1 tablet (10 mg total) by mouth daily   metFORMIN (GLUCOPHAGE) 850 mg tablet   No No   Sig: Take 1 tablet (850 mg total) by mouth 2 (two) times a day with meals   metoprolol tartrate (LOPRESSOR) 50 mg tablet   No No   Sig: TAKE 1 TABLET BY MOUTH EVERY 12 HOURS   omeprazole (PriLOSEC) 20 mg delayed release capsule   No No   Sig: TAKE 1 CAPSULE (20 MG TOTAL) BY MOUTH DAILY BEFORE BREAKFAST   rOPINIRole (REQUIP) 1 mg tablet   No No   Sig: TAKE 1 TABLET BY MOUTH EVERYDAY AT BEDTIME   simvastatin (ZOCOR) 40 mg tablet   No No   Sig: Take 1 tablet (40 mg total) by mouth daily at bedtime   tamsulosin (FLOMAX) 0 4 mg   No No   Sig: TAKE 1 CAPSULE BY MOUTH EVERY DAY WITH DINNER      Facility-Administered Medications: None       Past Medical History:   Diagnosis Date   • A-fib (HCC)    • Arthritis    • Cardiac arrhythmia     afib s/p ablasion    • GERD (gastroesophageal reflux disease)    • Hyperlipidemia     ldl 123 from 179 from 138, -  zocor    • Hypertension    • Metabolic syndrome     history of metabolic syndrome with insulin resistance  Diet and lifestyle modification, weight reduction, daily exercise   Patient education regarding metabolic syndrome, insulin resistant, dyslipidemia  • Nocturia    • Renal disorder     chronic kidney disease stage 2   • Restless legs    • Seasonal allergies    • Sleep apnea     no cpap   • Vertigo        Past Surgical History:   Procedure Laterality Date   • CARDIAC ELECTROPHYSIOLOGY MAPPING AND ABLATION  2019   • COLONOSCOPY  01/01/2012    no polyps   • COLONOSCOPY     • PALATE / UVULA BIOPSY / EXCISION     • FL ARTHRS KNE SURG W/MENISCECTOMY MED/LAT W/SHVG Right 8/5/2021    Procedure: KNEE ARTHROSCOPIC PARTIAL MEDIAL MENISCECTOMY, CHONDROPLASTY;  Surgeon: Franko Jean-Baptiste MD;  Location: [facility] MA[facility];  Service: Orthopedics   • SHOULDER SURGERY Bilateral    • SHOULDER SURGERY Bilateral     s/p 4 surgery - 2 on each side  Family History   Problem Relation Age of Onset   • Cancer Mother    • Heart disease Father    • Diabetes Sister    • Heart disease Brother    • Cancer Brother      I have reviewed and agree with the history as documented  E-Cigarette/Vaping   • E-Cigarette Use Never User      E-Cigarette/Vaping Substances   • Nicotine No    • THC No    • CBD No    • Flavoring No    • Other No    • Unknown No      Social History     Tobacco Use   • Smoking status: Never   • Smokeless tobacco: Never   Vaping Use   • Vaping Use: Never used   Substance Use Topics   • Alcohol use: Never   • Drug use: Never       Review of Systems   Constitutional: Negative  HENT: Negative  Eyes: Negative  Respiratory: Negative  Cardiovascular: Negative  Gastrointestinal: Negative  Endocrine: Negative  Genitourinary: Negative  Musculoskeletal: Negative  Skin: Negative  RIGHT LOWER LEG WOUND   Allergic/Immunologic: Negative  Neurological: Negative  Hematological: Negative  Psychiatric/Behavioral: Negative  Physical Exam  Physical Exam  Vitals and nursing note reviewed  Constitutional:       General: He is not in acute distress  Appearance: Normal appearance   He is not ill-appearing, toxic-appearing or diaphoretic  Comments: AVSS-- SYSTOLIC HTN--  PULSE OX 98 % ON RA- INTERPRETATION  IS NORMAL- NO INTERVENTION- WELL APPEARING IN NAD    HENT:      Head: Normocephalic and atraumatic  Nose: Nose normal       Mouth/Throat:      Mouth: Mucous membranes are moist    Eyes:      General: No scleral icterus  Right eye: No discharge  Left eye: No discharge  Extraocular Movements: Extraocular movements intact  Conjunctiva/sclera: Conjunctivae normal       Pupils: Pupils are equal, round, and reactive to light  Comments:  MM PINK   Neck:      Vascular: No carotid bruit  Comments: NO PMT C/T/L/S SPINE   Cardiovascular:      Rate and Rhythm: Normal rate and regular rhythm  Pulses: Normal pulses  Heart sounds: Normal heart sounds  No murmur heard  No friction rub  No gallop  Pulmonary:      Effort: Pulmonary effort is normal  No respiratory distress  Breath sounds: Normal breath sounds  No stridor  No wheezing, rhonchi or rales  Chest:      Chest wall: No tenderness  Abdominal:      General: Bowel sounds are normal  There is no distension  Palpations: Abdomen is soft  There is no mass  Tenderness: There is no abdominal tenderness  There is no right CVA tenderness, left CVA tenderness, guarding or rebound  Hernia: No hernia is present  Comments: SOFT NT/ND- NO HSM- NO CVA TENDERNESS- NO ASCITES- NO PERITONEAL SIGNS   Musculoskeletal:         General: No swelling, tenderness, deformity or signs of injury  Normal range of motion  Cervical back: Normal range of motion and neck supple  No rigidity or tenderness  Right lower leg: No edema  Left lower leg: No edema  Comments: RLE- NICKEL SIZED AREA OF INDURATION / TENDERNESS- ERYTHEMA- SMALL OPEN AREA- NO FLUCTUANCE - NO ASCENDING ERYTHEMA   Lymphadenopathy:      Cervical: No cervical adenopathy  Skin:     General: Skin is warm        Capillary Refill: Capillary refill takes less than 2 seconds  Coloration: Skin is not jaundiced or pale  Findings: No bruising, erythema, lesion or rash  Neurological:      General: No focal deficit present  Mental Status: He is alert and oriented to person, place, and time  Mental status is at baseline  Cranial Nerves: No cranial nerve deficit  Sensory: No sensory deficit  Motor: No weakness  Coordination: Coordination normal       Gait: Gait normal       Comments: NORMAL NON FOCAL NEURO EXAM    Psychiatric:         Mood and Affect: Mood normal          Behavior: Behavior normal          Thought Content: Thought content normal          Judgment: Judgment normal          Vital Signs  ED Triage Vitals [06/04/23 2057]   Temperature Pulse Respirations Blood Pressure SpO2   (!) 96 °F (35 6 °C) 72 18 (!) 178/83 97 %      Temp Source Heart Rate Source Patient Position - Orthostatic VS BP Location FiO2 (%)   Oral Monitor Sitting Right arm --      Pain Score       6           Vitals:    06/04/23 2057 06/04/23 2241   BP: (!) 178/83 160/70   Pulse: 72 70   Patient Position - Orthostatic VS: Sitting Sitting         Visual Acuity      ED Medications  Medications   doxycycline hyclate (VIBRAMYCIN) capsule 100 mg (100 mg Oral Given 6/4/23 2303)   acetaminophen (TYLENOL) tablet 650 mg (650 mg Oral Given 6/4/23 2303)       Diagnostic Studies  Results Reviewed     None                 No orders to display              Procedures  Procedures         ED Course                               SBIRT 20yo+    Flowsheet Row Most Recent Value   Initial Alcohol Screen: US AUDIT-C     1  How often do you have a drink containing alcohol? 0 Filed at: 06/04/2023 2233   2  How many drinks containing alcohol do you have on a typical day you are drinking? 0 Filed at: 06/04/2023 2233   3a  Male UNDER 65: How often do you have five or more drinks on one occasion? 0 Filed at: 06/04/2023 2233   3b  FEMALE Any Age, or MALE 65+:  How often do you have 4 or more drinks on one occassion? 0 Filed at: 06/04/2023 2233   Audit-C Score 0 Filed at: 06/04/2023 2233   TIFFANIE: How many times in the past year have you    Used an illegal drug or used a prescription medication for non-medical reasons? Never Filed at: 06/04/2023 2233                    MDM    Disposition  Final diagnoses:   Cellulitis of right lower extremity     Time reflects when diagnosis was documented in both MDM as applicable and the Disposition within this note     Time User Action Codes Description Comment    6/4/2023 11:05 PM Gail Hector Add [[id number]] Cellulitis of right lower extremity       ED Disposition     ED Disposition   Discharge    Condition   Stable    Date/Time   Sun Jun 4, 2023 2309    Comment   Tobi Hoffmann discharge to home/self care                 Follow-up Information    None         Discharge Medication List as of 6/4/2023 11:12 PM      START taking these medications    Details   doxycycline hyclate (VIBRAMYCIN) 100 mg capsule Take 1 capsule (100 mg total) by mouth 2 (two) times a day for 13 doses, Starting Sun 6/4/2023, Until Sun 6/11/2023, Normal         CONTINUE these medications which have NOT CHANGED    Details   aspirin (ECOTRIN LOW STRENGTH) 81 mg EC tablet Take 81 mg by mouth daily, Historical Med      lisinopril (ZESTRIL) 20 mg tablet TAKE 1 TABLET BY MOUTH EVERY DAY IN THE MORNING, Normal      loratadine (CLARITIN) 10 mg tablet Take 1 tablet (10 mg total) by mouth daily, Starting Tue 5/16/2023, Normal      metFORMIN (GLUCOPHAGE) 850 mg tablet Take 1 tablet (850 mg total) by mouth 2 (two) times a day with meals, Starting Tue 5/16/2023, Normal      metoprolol tartrate (LOPRESSOR) 50 mg tablet TAKE 1 TABLET BY MOUTH EVERY 12 HOURS, Normal      omeprazole (PriLOSEC) 20 mg delayed release capsule TAKE 1 CAPSULE (20 MG TOTAL) BY MOUTH DAILY BEFORE BREAKFAST, Starting Mon 11/23/2020, Normal      rOPINIRole (REQUIP) 1 mg tablet TAKE 1 TABLET BY MOUTH EVERYDAY AT BEDTIME, Normal      simvastatin (ZOCOR) 40 mg tablet Take 1 tablet (40 mg total) by mouth daily at bedtime, Starting Mon 10/17/2022, Normal      tamsulosin (FLOMAX) 0 4 mg TAKE 1 CAPSULE BY MOUTH EVERY DAY WITH DINNER, Normal             No discharge procedures on file      PDMP Review       Value Time User    PDMP Reviewed  Yes 8/5/2021  1:29 PM Russell Whitman MD          ED Provider  Electronically Signed by           Jose Sterling MD  06/08/23 1126

## 2023-06-19 ENCOUNTER — TELEPHONE (OUTPATIENT)
Dept: FAMILY MEDICINE CLINIC | Facility: CLINIC | Age: 63
End: 2023-06-19

## 2023-06-21 ENCOUNTER — TELEPHONE (OUTPATIENT)
Dept: FAMILY MEDICINE CLINIC | Facility: CLINIC | Age: 63
End: 2023-06-21

## 2023-06-21 NOTE — TELEPHONE ENCOUNTER
----- Message from Didi Jose, [address] sent at 6/20/2023 10:49 AM EDT -----  Recent triglycerides elevated 165 to take fish oil 1000 mg over-the-counter  Also to maintain a low-cholesterol low-fat  Anion gap slightly low 3  We will continue to monitor routine lab work  Creatinine levels within normal range compared to prior levels at 1 40  Patient's EGFR 48 currently stage IIIa moderate kidney disease  Albumin level slightly low at 3 3    We will continue to monitor with routine lab work

## 2023-07-30 DIAGNOSIS — I10 ESSENTIAL HYPERTENSION: ICD-10-CM

## 2023-07-30 DIAGNOSIS — N40.0 BPH WITHOUT OBSTRUCTION/LOWER URINARY TRACT SYMPTOMS: ICD-10-CM

## 2023-07-31 RX ORDER — LISINOPRIL 20 MG/1
TABLET ORAL
Qty: 90 TABLET | Refills: 1 | Status: SHIPPED | OUTPATIENT
Start: 2023-07-31

## 2023-07-31 RX ORDER — TAMSULOSIN HYDROCHLORIDE 0.4 MG/1
CAPSULE ORAL
Qty: 90 CAPSULE | Refills: 1 | Status: SHIPPED | OUTPATIENT
Start: 2023-07-31

## 2023-08-07 ENCOUNTER — VBI (OUTPATIENT)
Dept: ADMINISTRATIVE | Facility: OTHER | Age: 63
End: 2023-08-07

## 2023-09-05 DIAGNOSIS — J30.2 SEASONAL ALLERGIES: ICD-10-CM

## 2023-09-05 RX ORDER — LORATADINE 10 MG/1
10 TABLET ORAL DAILY
Qty: 90 TABLET | Refills: 1 | Status: SHIPPED | OUTPATIENT
Start: 2023-09-05

## 2023-09-11 PROBLEM — E11.9 TYPE 2 DIABETES MELLITUS, WITHOUT LONG-TERM CURRENT USE OF INSULIN (HCC): Status: ACTIVE | Noted: 2023-09-11

## 2023-09-11 NOTE — PROGRESS NOTES
Assessment/Plan:  Problem List Items Addressed This Visit        Digestive    Gastroesophageal reflux disease     Stable. Long-term PPI risks discussed. D/C Omeprazole 20mg QD. Start Pepcid 20mg BID PRN. Watch GERD diet. To consider GI referral for EGD if uncontrolled? Relevant Medications    famotidine (PEPCID) 20 mg tablet    Other Relevant Orders    Magnesium       Endocrine    Type 2 diabetes mellitus, without long-term current use of insulin ([address]) - Primary       Lab Results   Component Value Date    HGBA1C 6.6 (A) 05/16/2023     Pending labs. Continue Metformin. To consider GLP-1 agonist or SGLT-2 inhibitor in future? Patient declines DM education 9/12/23. Recommend lifestyle modifications. Goal blood sugars:  Fasting in AM - Less than 100. 2 hours after any meal - Less than 140. Relevant Orders    Ambulatory Referral to Ophthalmology    Hemoglobin A1C    Albumin / creatinine urine ratio    Glucometer    Glucometer test strips    Lancets       Respiratory    JANEY (obstructive sleep apnea)     Patient intolerant of CPAP. He is interested in [employer]. Relevant Orders    Ambulatory Referral to Otolaryngology       Cardiovascular and Mediastinum    Essential hypertension     Stable. Check blood pressure outside of office. Recommend lifestyle modifications. Relevant Medications    metoprolol tartrate (LOPRESSOR) 50 mg tablet    Other Relevant Orders    Comprehensive metabolic panel    Magnesium       Musculoskeletal and Integument    Primary osteoarthritis of both knees     Pending Ortho consult. You may use Tylenol (Acetaminophen) up to 3,000mg daily (in 24 hours) as needed for pain or fever.          Relevant Orders    Ambulatory Referral to Orthopedic Surgery    RESOLVED: Primary osteoarthritis of left knee    RESOLVED: Primary osteoarthritis of right knee       Genitourinary    Stage 3a chronic kidney disease ([address])     Lab Results   Component Value Date EGFR 53 05/15/2023    EGFR 48 02/20/2023    EGFR 46 08/15/2022    CREATININE 1.40 (H) 05/15/2023    CREATININE 1.51 (H) 02/20/2023    CREATININE 1.56 (H) 08/15/2022       Pending labs. Avoid NSAIDs. Relevant Orders    Comprehensive metabolic panel    Vitamin D 25 hydroxy       Other    Current mild episode of major depressive disorder without prior episode ([address])     Stable s meds and counseling. Relevant Orders    TSH, 3rd generation with Free T4 reflex    Class 3 severe obesity due to excess calories with serious comorbidity and body mass index (BMI) of 40.0 to 44.9 in adult [facility])     Improved. Recommend lifestyle modifications. Patient previously on Ozempic, but D/C due to cost.  To consider Weight Management referral PRN? Relevant Orders    TSH, 3rd generation with Free T4 reflex    Vitamin D 25 hydroxy    Mixed hyperlipidemia     Pending labs. Continue Zocor 40mg QHS. Recommend lifestyle modifications. Relevant Medications    simvastatin (ZOCOR) 40 mg tablet    Other Relevant Orders    TSH, 3rd generation with Free T4 reflex    LDL cholesterol, direct    Restless legs     Stable on Requip 1 mg nightly. Relevant Orders    Magnesium    Seasonal allergies     Stable on Claritin 10 mg daily. History of atrial fibrillation     Stable s/p ablation. Pending Cardio consult. Unsure if patient needs to continue ASA 81mg QD?          Relevant Orders    Ambulatory Referral to Cardiology    TSH, 3rd generation with Free T4 reflex   Other Visit Diagnoses     Encounter for screening for HIV        Relevant Orders    HIV 1/2 AG/AB w Reflex [facility] for 2 yr old and above    Need for hepatitis C screening test        Relevant Orders    Hepatitis C antibody    Nocturia        Relevant Orders    Ambulatory Referral to Urology    Urinary hesitancy        Relevant Orders    Ambulatory Referral to Urology    Family history of early CAD        Relevant Orders    Ambulatory Referral to Cardiology    Ambulatory Referral to Genetics    Abscess        Relevant Medications    doxycycline (ADOXA) 100 MG tablet    Start Doxycycline 100mg BID x 10 days. Family history of ovarian cancer        Relevant Orders    Ambulatory Referral to Genetics    Family history of pancreatic cancer        Relevant Orders    Ambulatory Referral to Genetics    Low back pain, unspecified back pain laterality, unspecified chronicity, unspecified whether sciatica present        Relevant Orders    Ambulatory Referral to Comprehensive Spine PT    Immunization due        Relevant Orders    Pneumococcal Conjugate Vaccine 20-valent (Pcv20) (Completed)           Return in about 6 weeks (around 10/24/2023) for F/U DM2, HTN, HL, Dep, CKD, GERD, Afib, RLS, Labs. Future Appointments   Date Time Provider [address]   10/27/2023 10:00 AM Mariela Dyer,  [facility]        Subjective:     Nat Church is a 58 y.o. male who presents today as a new patient for his medical conditions. New Patient    Previous PCP:  Yina Nath at [address] [facility]  Reason for Transfer:  Provider Left  Last seen by previous PCP:  5/16/23  Last Labs:  5/15/23  Last Physical:  AWV 2/21/23  Medical Records Requested:  No      HPI:  Chief Complaint   Patient presents with   • New Patient Visit     Here to establish care today. No problems at this time, but would like second opinions on some things from previous PCP. • HM     DM foot at end of visit. Needs referral to ophthalmologist, pending. No additional covid boosters. -- Above per clinical staff and reviewed. --      Diabetes  He presents for his initial diabetic visit. He has type 2 diabetes mellitus. There are no hypoglycemic associated symptoms. Associated symptoms include fatigue (Intermittent). Pertinent negatives for diabetes include no chest pain.  Risk factors for coronary artery disease include diabetes mellitus, dyslipidemia, male sex and obesity. Current diabetic treatment includes diet and oral agent (monotherapy). He is following a generally unhealthy diet. He rarely participates in exercise. (Does not check BS at home. ) An ACE inhibitor/angiotensin II receptor blocker is being taken. He does not see a podiatrist.Eye exam is not current. Today:      Controlled Substance Review    [place] PDMP or [place]  reviewed: No red flags were identified; safe to proceed with prescription. Desires PSA and PINA? Last PSA 5/15/23. On ASA 81mg QD due to H/O Afib. Morbid Obesity - Not watching diet. No regular exercise due to knee pain. Does yard work. Previously on Ozempic, but stopped after a couple months in 2021 as Rx was too expensive previously. DM2 - Dx 5/16/23 POCT HgA1C 6.6. On Metformin 850mg 1 pill BID (no higher dose previously). No Optho. No Podiatry. Declines DM Education 9/12/23. HTN - On Metoprolol tartrate 50mg BID, Lisinopril 20mg QD. No BP check outside of office. Has wrist cuff at home. Hyperlipidemia -  On Zocor 40mg QHS. No higher dose or other statins previously. CKD Stage 3 - No Renal consult previously. Afib /Fam Hx Early CAD - Management per Cardio - [facility].  On ASA 81mg QD long term, Metoprolol 50mg BID. Previously on Eliquis, which was D/C s/p ablation, failed cardioversion previously. JANEY - Management per Sleep Medicine? He is CPAP intolerant despite trying different masks. GERD - On Omeprazole 20mg QD for years. No GI consult or EGD previously. No other GERD meds. Watching GERD diet. Depression - Stable s meds. Previously on unknown med D/C after 1-2 days due to weird nightmares. No counseling previously. Poor social supports. No SI/HI/AH/VH.       PHQ-2/9 Depression Screening    Little interest or pleasure in doing things: 1 - several days  Feeling down, depressed, or hopeless: 1 - several days  Trouble falling or staying asleep, or sleeping too much: 1 - several days  Feeling tired or having little energy: 1 - several days  Poor appetite or overeatin - several days  Feeling bad about yourself - or that you are a failure or have let yourself or your family down: 0 - not at all  Trouble concentrating on things, such as reading the newspaper or watching television: 0 - not at all  Moving or speaking so slowly that other people could have noticed. Or the opposite - being so fidgety or restless that you have been moving around a lot more than usual: 0 - not at all  Thoughts that you would be better off dead, or of hurting yourself in some way: 0 - not at all  PHQ-9 Score: 5   PHQ-9 Interpretation: Mild depression          JAKE-7 Flowsheet Screening    Flowsheet Row Most Recent Value   Over the last 2 weeks, how often have you been bothered by any of the following problems? Feeling nervous, anxious, or on edge 0   Not being able to stop or control worrying 0   Worrying too much about different things 0   Trouble relaxing 0   Being so restless that it is hard to sit still 0   Becoming easily annoyed or irritable 0   Feeling afraid as if something awful might happen 0   JAKE-7 Total Score 0        MDQ:  0, Asynchronous, No Problem    RLS - Stable on Requp 1mg QHS. No higher dose or other meds previously. OA B/L Knees - Management per Ortho Dr. Oneal Goodpasture. Next appt PRN. Declined PT. AR - On Claritin 10mg QD. Nocturia - On Flomax 0.4mg QHS. Does not see Uro. Nocturia x 2. Has urinary hesitancy. No prostate exam previously. Reviewed:  Labs 5/15/23    No Uro. The following portions of the patient's history were reviewed and updated as appropriate: allergies, current medications, past family history, past medical history, past social history, past surgical history and problem list.      Review of Systems   Constitutional: Positive for fatigue (Intermittent).  Negative for appetite change, chills, diaphoresis and fever. Respiratory: Negative for chest tightness and shortness of breath. Cardiovascular: Negative for chest pain and palpitations. Gastrointestinal: Positive for vomiting (Occurs once monthly after swallowing food). Negative for abdominal pain, blood in stool, diarrhea and nausea. Genitourinary: Negative for dysuria. Nocturia x 2        Current Outpatient Medications   Medication Sig Dispense Refill   • aspirin (ECOTRIN LOW STRENGTH) 81 mg EC tablet Take 81 mg by mouth daily     • doxycycline (ADOXA) 100 MG tablet Take 1 tablet (100 mg total) by mouth 2 (two) times a day for 10 days 20 tablet 0   • famotidine (PEPCID) 20 mg tablet Take 1 tablet (20 mg total) by mouth 2 (two) times a day 60 tablet 1   • lisinopril (ZESTRIL) 20 mg tablet TAKE 1 TABLET BY MOUTH EVERY DAY IN THE MORNING (Patient taking differently: Take 20 mg by mouth daily) 90 tablet 1   • loratadine (CLARITIN) 10 mg tablet TAKE 1 TABLET BY MOUTH EVERY DAY (Patient taking differently: Take 10 mg by mouth daily as needed for allergies) 90 tablet 1   • metFORMIN (GLUCOPHAGE) 850 mg tablet Take 1 tablet (850 mg total) by mouth 2 (two) times a day with meals (Patient taking differently: Take 850 mg by mouth 2 (two) times a day with meals) 180 tablet 2   • metoprolol tartrate (LOPRESSOR) 50 mg tablet Take 1 tablet (50 mg total) by mouth 2 (two) times a day 180 tablet 0   • rOPINIRole (REQUIP) 1 mg tablet TAKE 1 TABLET BY MOUTH EVERYDAY AT BEDTIME (Patient taking differently: Take 1 mg by mouth daily at bedtime) 90 tablet 1   • simvastatin (ZOCOR) 40 mg tablet Take 1 tablet (40 mg total) by mouth daily at bedtime 90 tablet 0   • tamsulosin (FLOMAX) 0.4 mg TAKE 1 CAPSULE BY MOUTH EVERY DAY WITH DINNER (Patient taking differently: Take 0.4 mg by mouth daily with dinner) 90 capsule 1     No current facility-administered medications for this visit.        Objective:  /70   Pulse 68   Temp (!) 97.2 °F (36.2 °C)   Resp 18 Ht 5' 9" (1.753 m)   Wt (!) 136 kg (300 lb 12.8 oz)   SpO2 94%   BMI 44.42 kg/m²    Wt Readings from Last 3 Encounters:   09/12/23 (!) 136 kg (300 lb 12.8 oz)   06/04/23 (!) 138 kg (305 lb)   05/16/23 (!) 138 kg (303 lb 9.6 oz)      BP Readings from Last 3 Encounters:   09/12/23 128/70   06/04/23 160/70   05/16/23 110/70          Physical Exam  Vitals and nursing note reviewed. Constitutional:       Appearance: Normal appearance. He is well-developed. He is obese. HENT:      Head: Normocephalic and atraumatic. Eyes:      Conjunctiva/sclera: Conjunctivae normal.   Neck:      Thyroid: No thyromegaly. Vascular: No carotid bruit. Cardiovascular:      Rate and Rhythm: Normal rate and regular rhythm. Pulses: Normal pulses. Heart sounds: Normal heart sounds. Pulmonary:      Effort: Pulmonary effort is normal.      Breath sounds: Normal breath sounds. Abdominal:      General: Bowel sounds are normal. There is no distension. Palpations: Abdomen is soft. There is no mass. Tenderness: There is no abdominal tenderness. There is no guarding or rebound. Musculoskeletal:         General: Tenderness (Right inferior pole of knee c abscess c white discharge, nonfluctuant) present. No swelling. Cervical back: Neck supple. Right lower leg: No edema. Left lower leg: No edema. Lymphadenopathy:      Cervical: No cervical adenopathy. Neurological:      General: No focal deficit present. Mental Status: He is alert and oriented to person, place, and time. Psychiatric:         Mood and Affect: Mood normal.         Behavior: Behavior normal.         Thought Content:  Thought content normal.         Judgment: Judgment normal.         Lab Results:      Lab Results   Component Value Date    WBC 5.64 05/15/2023    HGB 15.2 05/15/2023    HCT 49.4 (H) 05/15/2023     05/15/2023    TRIG 165 (H) 05/15/2023    HDL 37 (L) 05/15/2023    ALT 29 05/15/2023    AST 34 05/15/2023    K 4.9 05/15/2023     05/15/2023    CREATININE 1.40 (H) 05/15/2023    BUN 23 05/15/2023    CO2 25 05/15/2023    PSA 0.7 05/15/2023    GLUF 113 (H) 05/15/2023    HGBA1C 6.6 (A) 05/16/2023     No results found for: "URICACID"  Invalid input(s): "BASENAME" Vitamin D    No results found.      POCT Labs

## 2023-09-12 ENCOUNTER — OFFICE VISIT (OUTPATIENT)
Dept: FAMILY MEDICINE CLINIC | Facility: CLINIC | Age: 63
End: 2023-09-12
Payer: COMMERCIAL

## 2023-09-12 VITALS
HEART RATE: 68 BPM | SYSTOLIC BLOOD PRESSURE: 128 MMHG | RESPIRATION RATE: 18 BRPM | WEIGHT: 300.8 LBS | TEMPERATURE: 97.2 F | BODY MASS INDEX: 44.55 KG/M2 | HEIGHT: 69 IN | DIASTOLIC BLOOD PRESSURE: 70 MMHG | OXYGEN SATURATION: 94 %

## 2023-09-12 DIAGNOSIS — M54.50 LOW BACK PAIN, UNSPECIFIED BACK PAIN LATERALITY, UNSPECIFIED CHRONICITY, UNSPECIFIED WHETHER SCIATICA PRESENT: ICD-10-CM

## 2023-09-12 DIAGNOSIS — F32.0 CURRENT MILD EPISODE OF MAJOR DEPRESSIVE DISORDER WITHOUT PRIOR EPISODE (HCC): ICD-10-CM

## 2023-09-12 DIAGNOSIS — Z11.59 NEED FOR HEPATITIS C SCREENING TEST: ICD-10-CM

## 2023-09-12 DIAGNOSIS — R35.1 NOCTURIA: ICD-10-CM

## 2023-09-12 DIAGNOSIS — E78.2 MIXED HYPERLIPIDEMIA: ICD-10-CM

## 2023-09-12 DIAGNOSIS — N18.31 STAGE 3A CHRONIC KIDNEY DISEASE (HCC): ICD-10-CM

## 2023-09-12 DIAGNOSIS — K21.9 GASTROESOPHAGEAL REFLUX DISEASE, UNSPECIFIED WHETHER ESOPHAGITIS PRESENT: ICD-10-CM

## 2023-09-12 DIAGNOSIS — L02.91 ABSCESS: ICD-10-CM

## 2023-09-12 DIAGNOSIS — M17.0 PRIMARY OSTEOARTHRITIS OF BOTH KNEES: ICD-10-CM

## 2023-09-12 DIAGNOSIS — M17.12 PRIMARY OSTEOARTHRITIS OF LEFT KNEE: ICD-10-CM

## 2023-09-12 DIAGNOSIS — Z86.79 HISTORY OF ATRIAL FIBRILLATION: ICD-10-CM

## 2023-09-12 DIAGNOSIS — M17.11 PRIMARY OSTEOARTHRITIS OF RIGHT KNEE: ICD-10-CM

## 2023-09-12 DIAGNOSIS — Z80.41 FAMILY HISTORY OF OVARIAN CANCER: ICD-10-CM

## 2023-09-12 DIAGNOSIS — J30.2 SEASONAL ALLERGIES: ICD-10-CM

## 2023-09-12 DIAGNOSIS — G47.33 OSA (OBSTRUCTIVE SLEEP APNEA): ICD-10-CM

## 2023-09-12 DIAGNOSIS — Z80.0 FAMILY HISTORY OF PANCREATIC CANCER: ICD-10-CM

## 2023-09-12 DIAGNOSIS — I10 ESSENTIAL HYPERTENSION: ICD-10-CM

## 2023-09-12 DIAGNOSIS — Z23 IMMUNIZATION DUE: ICD-10-CM

## 2023-09-12 DIAGNOSIS — E11.9 TYPE 2 DIABETES MELLITUS WITHOUT COMPLICATION, WITHOUT LONG-TERM CURRENT USE OF INSULIN (HCC): Primary | ICD-10-CM

## 2023-09-12 DIAGNOSIS — Z82.49 FAMILY HISTORY OF EARLY CAD: ICD-10-CM

## 2023-09-12 DIAGNOSIS — E66.01 CLASS 3 SEVERE OBESITY DUE TO EXCESS CALORIES WITH SERIOUS COMORBIDITY AND BODY MASS INDEX (BMI) OF 40.0 TO 44.9 IN ADULT (HCC): ICD-10-CM

## 2023-09-12 DIAGNOSIS — R39.11 URINARY HESITANCY: ICD-10-CM

## 2023-09-12 DIAGNOSIS — Z11.4 ENCOUNTER FOR SCREENING FOR HIV: ICD-10-CM

## 2023-09-12 DIAGNOSIS — G25.81 RESTLESS LEGS: ICD-10-CM

## 2023-09-12 PROBLEM — Z00.00 ENCOUNTER FOR PREVENTATIVE ADULT HEALTH CARE EXAMINATION: Status: RESOLVED | Noted: 2021-02-11 | Resolved: 2023-09-12

## 2023-09-12 PROBLEM — R73.03 PRE-DIABETES: Status: RESOLVED | Noted: 2022-05-17 | Resolved: 2023-09-12

## 2023-09-12 PROBLEM — K21.00 REFLUX ESOPHAGITIS: Status: RESOLVED | Noted: 2020-04-21 | Resolved: 2023-09-12

## 2023-09-12 PROBLEM — N18.9 CHRONIC KIDNEY DISEASE: Status: ACTIVE | Noted: 2023-09-12

## 2023-09-12 PROBLEM — N18.9 CHRONIC KIDNEY DISEASE: Status: RESOLVED | Noted: 2023-09-12 | Resolved: 2023-09-12

## 2023-09-12 PROBLEM — B35.3 TINEA PEDIS OF LEFT FOOT: Status: RESOLVED | Noted: 2021-11-16 | Resolved: 2023-09-12

## 2023-09-12 PROBLEM — H92.01 RIGHT EAR PAIN: Status: RESOLVED | Noted: 2022-08-17 | Resolved: 2023-09-12

## 2023-09-12 PROBLEM — M62.830 SPASM OF PARASPINAL MUSCLE: Status: RESOLVED | Noted: 2022-05-17 | Resolved: 2023-09-12

## 2023-09-12 PROCEDURE — 90677 PCV20 VACCINE IM: CPT

## 2023-09-12 PROCEDURE — 99214 OFFICE O/P EST MOD 30 MIN: CPT | Performed by: FAMILY MEDICINE

## 2023-09-12 PROCEDURE — G0009 ADMIN PNEUMOCOCCAL VACCINE: HCPCS

## 2023-09-12 RX ORDER — FAMOTIDINE 20 MG/1
20 TABLET, FILM COATED ORAL 2 TIMES DAILY
Qty: 60 TABLET | Refills: 1 | Status: SHIPPED | OUTPATIENT
Start: 2023-09-12

## 2023-09-12 RX ORDER — METOPROLOL TARTRATE 50 MG/1
50 TABLET, FILM COATED ORAL 2 TIMES DAILY
Qty: 180 TABLET | Refills: 0 | Status: SHIPPED | OUTPATIENT
Start: 2023-09-12

## 2023-09-12 RX ORDER — DOXYCYCLINE 100 MG/1
100 TABLET ORAL 2 TIMES DAILY
Qty: 20 TABLET | Refills: 0 | Status: SHIPPED | OUTPATIENT
Start: 2023-09-12 | End: 2023-09-22

## 2023-09-12 RX ORDER — SIMVASTATIN 40 MG
40 TABLET ORAL
Qty: 90 TABLET | Refills: 0 | Status: SHIPPED | OUTPATIENT
Start: 2023-09-12

## 2023-09-12 NOTE — ASSESSMENT & PLAN NOTE
Lab Results   Component Value Date    EGFR 53 05/15/2023    EGFR 48 02/20/2023    EGFR 46 08/15/2022    CREATININE 1.40 (H) 05/15/2023    CREATININE 1.51 (H) 02/20/2023    CREATININE 1.56 (H) 08/15/2022       Pending labs. Avoid NSAIDs.

## 2023-09-12 NOTE — ASSESSMENT & PLAN NOTE
Improved. Recommend lifestyle modifications. Patient previously on Ozempic, but D/C due to cost.  To consider Weight Management referral PRN?

## 2023-09-12 NOTE — ASSESSMENT & PLAN NOTE
Pending Ortho consult. You may use Tylenol (Acetaminophen) up to 3,000mg daily (in 24 hours) as needed for pain or fever.

## 2023-09-12 NOTE — ASSESSMENT & PLAN NOTE
Stable. Long-term PPI risks discussed. D/C Omeprazole 20mg QD. Start Pepcid 20mg BID PRN. Watch GERD diet. To consider GI referral for EGD if uncontrolled?

## 2023-09-12 NOTE — PROGRESS NOTES
Diabetic Foot Exam    Patient's shoes and socks removed. Right Foot/Ankle   Right Foot Inspection  Skin Exam: skin normal, skin intact, callus and callus. No dry skin, no warmth, no erythema, no maceration, no abnormal color, no pre-ulcer and no ulcer. Toe Exam: ROM and strength within normal limits. Vascular  Capillary refills: elevated  The right DP pulse is 1+. Left Foot/Ankle  Left Foot Inspection  Skin Exam: skin normal, skin intact and callus. No dry skin, no warmth, no erythema, no maceration, normal color, no pre-ulcer and no ulcer. Toe Exam: ROM and strength within normal limits. Vascular  Capillary refills: elevated  The left DP pulse is 1+.      Assign Risk Category  No deformity present  Loss of protective sensation  Weak pulses  Risk: 2

## 2023-09-12 NOTE — ASSESSMENT & PLAN NOTE
Lab Results   Component Value Date    HGBA1C 6.6 (A) 05/16/2023     Pending labs. Continue Metformin. To consider GLP-1 agonist or SGLT-2 inhibitor in future? Patient declines DM education 9/12/23. Recommend lifestyle modifications. Goal blood sugars:  Fasting in AM - Less than 100. 2 hours after any meal - Less than 140.

## 2023-09-14 ENCOUNTER — TELEPHONE (OUTPATIENT)
Dept: HEMATOLOGY ONCOLOGY | Facility: CLINIC | Age: 63
End: 2023-09-14

## 2023-09-14 NOTE — TELEPHONE ENCOUNTER
I called Emily Manzo in response to a referral that was received for patient to establish care with [facility]. Outreach was made to schedule a consultation. I left a voicemail explaining the reason for my call and advised patient to call [facility] at [phone]. The referral has been closed.

## 2023-09-14 NOTE — TELEPHONE ENCOUNTER
I spoke with Tobi to schedule their consultation with [facility]. Scheduling Outcome: Patient is scheduled for an appointment on 2/20/24 at [place] with Carmela Liriano    Personal/Family History Related to Appointment:     Personal History of Cancer: Patient reports no personal history of cancer    Family History of Cancer: Patient reports family history of mother-ovarian;brother-pancreatic; Is patient of [address] [place] [address] [address]?: No    History of Genetic Testing:  Personal History of Genetic Testing: Patient report no personal history of Genetic Testing. Family History of Genetic Testing: Patient reports that no family members have had Genetic Testing. Progeny:  Is patient able to complete our family history questionnaire on a computer?:  Yes    Patient's preferred e-mail address:   [email or web address]

## 2023-09-15 ENCOUNTER — CONSULT (OUTPATIENT)
Dept: OBGYN CLINIC | Facility: CLINIC | Age: 63
End: 2023-09-15
Payer: COMMERCIAL

## 2023-09-15 VITALS — BODY MASS INDEX: 44.14 KG/M2 | HEIGHT: 69 IN | WEIGHT: 298 LBS

## 2023-09-15 DIAGNOSIS — M17.0 PRIMARY OSTEOARTHRITIS OF BOTH KNEES: ICD-10-CM

## 2023-09-15 DIAGNOSIS — S83.242A OTHER TEAR OF MEDIAL MENISCUS, CURRENT INJURY, LEFT KNEE, INITIAL ENCOUNTER: Primary | ICD-10-CM

## 2023-09-15 DIAGNOSIS — M22.2X9 DISORDER OF PATELLOFEMORAL JOINT, UNSPECIFIED LATERALITY: ICD-10-CM

## 2023-09-15 PROCEDURE — 99214 OFFICE O/P EST MOD 30 MIN: CPT | Performed by: ORTHOPAEDIC SURGERY

## 2023-09-15 RX ORDER — LANCETS
EACH MISCELLANEOUS
COMMUNITY
Start: 2023-09-12

## 2023-09-15 RX ORDER — BLOOD-GLUCOSE METER
EACH MISCELLANEOUS
COMMUNITY
Start: 2023-09-12

## 2023-09-15 RX ORDER — BLOOD SUGAR DIAGNOSTIC
STRIP MISCELLANEOUS
COMMUNITY
Start: 2023-09-12

## 2023-09-15 NOTE — PROGRESS NOTES
Assessment:  1. Other tear of medial meniscus, current injury, left knee, initial encounter  MRI knee left  wo contrast      2. Primary osteoarthritis of both knees  Ambulatory Referral to Orthopedic Surgery      3. Disorder of patellofemoral joint, unspecified laterality          Patient Active Problem List   Diagnosis   • Essential hypertension   • Current mild episode of major depressive disorder without prior episode (HCC)   • Class 3 severe obesity due to excess calories with serious comorbidity and body mass index (BMI) of 40.0 to 44.9 in adult [facility])   • Gastroesophageal reflux disease   • Complex tear of medial meniscus of right knee as current injury   • Stage 3a chronic kidney disease ([address])   • MURRAY (dyspnea on exertion)   • Mixed hyperlipidemia   • Primary osteoarthritis of both knees   • JANEY (obstructive sleep apnea)   • Seasonal allergies   • Type 2 diabetes mellitus, without long-term current use of insulin ([facility])   • History of atrial fibrillation   • Restless legs   • Disorder of patellofemoral joint   • Other tear of medial meniscus, current injury, left knee, initial encounter       Plan:    58 y.o. male with bilateral knee pain, left worse than right, mild right knee OA, suspected left knee medial meniscus tear    • Will proceed with MRI of left knee to confirm presence of medial meniscus tear  • Follow up after MRI to review  • Pt offered CSI he declined  • Discussed possibility of PT being needed if he doesn't progress with HEP after possible surgery; he is hesitant to proceed with PT at this time    The patient was seen and examined by Dr. Darlene Cramer and myself. The assessment and plan were formulated by Dr. Darlene Cramer and I assisted in carrying it out. Subjective:   Patient ID: Gwen Vasquez is a 58 y.o. male . HPI    Patient comes in today with regards to bilateral knee pain, left worse than right. Patient is referred to us by Bert Alanis DO for further evaluation.  The patient reports that the pain been going on for tears. Injury or trauma prior to onset of pain: none. Pain is located in the medial left knee, anterior right knee. It is worsened with stairs up worse than down, and is made better with rest. Says it feels same as right knee meniscus tear prior to his prior surgery. Treatments tried: none . The pain does not radiate . Old injuries or prior surgeries: SARK PMM by Dr Ish Pearson remotely. Numbness or tingling: none. Feels popping in left knee . The following portions of the patient's history were reviewed and updated as appropriate: allergies, current medications, past family history, past social history, past surgical history and problem list.    Social History     Socioeconomic History   • Marital status: /Civil Union     Spouse name: Not on file   • Number of children: 2   • Years of education: Not on file   • Highest education level: Not on file   Occupational History   • Occupation: [employer] -  / [employer]   Tobacco Use   • Smoking status: Never   • Smokeless tobacco: Never   Vaping Use   • Vaping Use: Never used   Substance and Sexual Activity   • Alcohol use:  Yes     Alcohol/week: 2.0 standard drinks of alcohol     Types: 2 Standard drinks or equivalent per week     Comment: once a month if that   • Drug use: Never   • Sexual activity: Not Currently     Partners: Female   Other Topics Concern   • Not on file   Social History Narrative        Lives with wife and grandson    2 Children - 2 Sons    [employer] -  / [employer]     Social Determinants of Health     Financial Resource Strain: [address] [facility]  (2/21/2023)    Overall Financial Resource Strain (CARDI)    • Difficulty of Paying Living Expenses: Not hard at all   Food Insecurity: No Food Insecurity (2/11/2021)    Hunger Vital Sign    • Worried About Running Out of Food in the Last Year: Never true    • Ran Out of Food in the Last Year: Never true   Transportation Needs: No Transportation Needs (2/21/2023)    PRAPARE - Transportation    • Lack of Transportation (Medical): No    • Lack of Transportation (Non-Medical): No   Physical Activity: Inactive (5/13/2021)    Exercise Vital Sign    • Days of Exercise per Week: 0 days    • Minutes of Exercise per Session: 0 min   Stress: No Stress Concern Present (5/13/2021)    [address] [facility]    • Feeling of Stress : Not at all   Social Connections: Not on file   Intimate Partner Violence: Not on file   Housing Stability: Not on file     Past Medical History:   Diagnosis Date   • Chronic kidney disease 04/20   • Depression 04/20   • GERD (gastroesophageal reflux disease)    • History of atrial fibrillation     s/p ablation   • Hyperlipidemia     ldl 123 from 179 from 138, -. zocor    • Hypertension    • Morbid obesity ([address])    • Nocturia    • Osteoarthritis    • Restless legs    • Seasonal allergies    • Sleep apnea     no cpap   • Type 2 diabetes mellitus, without long-term current use of insulin ([address]) 09/11/2023   • Visual impairment 1970    Not wearing glasses     Past Surgical History:   Procedure Laterality Date   • CARDIAC ELECTROPHYSIOLOGY MAPPING AND ABLATION  2019   • COLONOSCOPY  01/01/2012    no polyps   • COLONOSCOPY     • PALATE / UVULA BIOPSY / EXCISION      JANEY   • AL ARTHRS KNE SURG W/MENISCECTOMY MED/LAT W/SHVG Right 08/05/2021    Procedure: KNEE ARTHROSCOPIC PARTIAL MEDIAL MENISCECTOMY, CHONDROPLASTY;  Surgeon: Kal Chen MD;  Location: [facility] MAIN OR;  Service: Orthopedics   • SHOULDER SURGERY Bilateral     s/p 4 surgery - 2 on each side.      No Known Allergies  Current Outpatient Medications on File Prior to Visit   Medication Sig Dispense Refill   • Accu-Chek Guide test strip      • Accu-Chek Softclix Lancets lancets      • aspirin (ECOTRIN LOW STRENGTH) 81 mg EC tablet Take 81 mg by mouth daily     • Blood Glucose Monitoring Suppl (Accu-Chek Guide Me) w/Device KIT      • doxycycline (ADOXA) 100 MG tablet Take 1 tablet (100 mg total) by mouth 2 (two) times a day for 10 days 20 tablet 0   • famotidine (PEPCID) 20 mg tablet Take 1 tablet (20 mg total) by mouth 2 (two) times a day 60 tablet 1   • lisinopril (ZESTRIL) 20 mg tablet TAKE 1 TABLET BY MOUTH EVERY DAY IN THE MORNING (Patient taking differently: Take 20 mg by mouth daily) 90 tablet 1   • loratadine (CLARITIN) 10 mg tablet TAKE 1 TABLET BY MOUTH EVERY DAY (Patient taking differently: Take 10 mg by mouth daily as needed for allergies) 90 tablet 1   • metFORMIN (GLUCOPHAGE) 850 mg tablet Take 1 tablet (850 mg total) by mouth 2 (two) times a day with meals (Patient taking differently: Take 850 mg by mouth 2 (two) times a day with meals) 180 tablet 2   • metoprolol tartrate (LOPRESSOR) 50 mg tablet Take 1 tablet (50 mg total) by mouth 2 (two) times a day 180 tablet 0   • rOPINIRole (REQUIP) 1 mg tablet TAKE 1 TABLET BY MOUTH EVERYDAY AT BEDTIME (Patient taking differently: Take 1 mg by mouth daily at bedtime) 90 tablet 1   • simvastatin (ZOCOR) 40 mg tablet Take 1 tablet (40 mg total) by mouth daily at bedtime 90 tablet 0   • tamsulosin (FLOMAX) 0.4 mg TAKE 1 CAPSULE BY MOUTH EVERY DAY WITH DINNER (Patient taking differently: Take 0.4 mg by mouth daily with dinner) 90 capsule 1     No current facility-administered medications on file prior to visit. Review of Systems      Objective: There were no vitals filed for this visit. Physical Exam  Musculoskeletal:      Right knee: No effusion. Instability Tests: Medial Claritza test negative and lateral Claritza test negative. Left knee: No effusion. Instability Tests: Medial Claritza test positive. Lateral Claritza test negative. Right Knee Exam     Muscle Strength   The patient has normal right knee strength. Tenderness   The patient is experiencing tenderness in the patella.     Range of Motion   The patient has normal right knee ROM.  Extension: normal   Flexion: normal     Tests   Claritza:  Medial - negative Lateral - negative  Varus: negative Valgus: negative  Patellar apprehension: negative    Other   Erythema: present  Scars: absent  Sensation: normal  Pulse: present  Swelling: none  Effusion: no effusion present    Comments:  No ecchymosis, no deformity  Positive patellar grind test    Small superficial pinpoint wound with serous drainage over anteromedial right knee      Left Knee Exam     Muscle Strength   The patient has normal left knee strength. Tenderness   The patient is experiencing tenderness in the medial joint line and patella. Range of Motion   The patient has normal left knee ROM. Extension: normal   Flexion: normal     Tests   Claritza:  Medial - positive Lateral - negative  Varus: negative Valgus: negative  Patellar apprehension: negative    Other   Erythema: absent  Scars: absent  Sensation: normal  Pulse: present  Swelling: none  Effusion: no effusion present    Comments:  No ecchymosis,  no deformity  Positive patellar grind test  + thessaly test                I have personally reviewed pertinent films in PACS. XRs of bilat knees form 11/4/22 shows no acute fracture, mild to moderate DJD with joint space narrowing    Procedures  No Procedures performed today      Scribe Attestation    I,:  Marilia Clifton PA-C am acting as a scribe while in the presence of the attending physician.:       I,:  Dahlia Schwab, DO personally performed the services described in this documentation    as scribed in my presence.:             Portions of the record may have been created with voice recognition software. Occasional wrong word or "sound a like" substitutions may have occurred due to the inherent limitations of voice recognition software. Read the chart carefully and recognize, using context, where substitutions have occurred.

## 2023-09-27 ENCOUNTER — HOSPITAL ENCOUNTER (OUTPATIENT)
Dept: MRI IMAGING | Facility: HOSPITAL | Age: 63
Discharge: HOME | End: 2023-09-27
Payer: COMMERCIAL

## 2023-09-27 DIAGNOSIS — S83.242A OTHER TEAR OF MEDIAL MENISCUS, CURRENT INJURY, LEFT KNEE, INITIAL ENCOUNTER: ICD-10-CM

## 2023-09-27 PROCEDURE — G1004 CDSM NDSC: HCPCS

## 2023-09-27 PROCEDURE — 73721 MRI JNT OF LWR EXTRE W/O DYE: CPT

## 2023-10-02 ENCOUNTER — TELEPHONE (OUTPATIENT)
Dept: OBGYN CLINIC | Facility: CLINIC | Age: 63
End: 2023-10-02

## 2023-10-04 DIAGNOSIS — K21.9 GASTROESOPHAGEAL REFLUX DISEASE, UNSPECIFIED WHETHER ESOPHAGITIS PRESENT: ICD-10-CM

## 2023-10-04 RX ORDER — FAMOTIDINE 20 MG/1
20 TABLET, FILM COATED ORAL 2 TIMES DAILY
Qty: 180 TABLET | Refills: 1 | Status: SHIPPED | OUTPATIENT
Start: 2023-10-04

## 2023-10-05 ENCOUNTER — OFFICE VISIT (OUTPATIENT)
Dept: OBGYN CLINIC | Facility: CLINIC | Age: 63
End: 2023-10-05
Payer: COMMERCIAL

## 2023-10-05 VITALS
SYSTOLIC BLOOD PRESSURE: 121 MMHG | HEART RATE: 65 BPM | HEIGHT: 69 IN | DIASTOLIC BLOOD PRESSURE: 74 MMHG | WEIGHT: 295 LBS | BODY MASS INDEX: 43.69 KG/M2

## 2023-10-05 DIAGNOSIS — E11.9 TYPE 2 DIABETES MELLITUS WITHOUT COMPLICATION, WITHOUT LONG-TERM CURRENT USE OF INSULIN (HCC): ICD-10-CM

## 2023-10-05 DIAGNOSIS — Z01.818 PRE-OP TESTING: ICD-10-CM

## 2023-10-05 DIAGNOSIS — S83.242A OTHER TEAR OF MEDIAL MENISCUS, CURRENT INJURY, LEFT KNEE, INITIAL ENCOUNTER: Primary | ICD-10-CM

## 2023-10-05 PROCEDURE — 99214 OFFICE O/P EST MOD 30 MIN: CPT | Performed by: ORTHOPAEDIC SURGERY

## 2023-10-05 RX ORDER — CHLORHEXIDINE GLUCONATE ORAL RINSE 1.2 MG/ML
15 SOLUTION DENTAL ONCE
OUTPATIENT
Start: 2023-10-05 | End: 2023-10-05

## 2023-10-05 NOTE — H&P (VIEW-ONLY)
Assessment:  1. Other tear of medial meniscus, current injury, left knee, initial encounter        2. Pre-op testing        3. Type 2 diabetes mellitus without complication, without long-term current use of insulin [facility])          Patient Active Problem List   Diagnosis   • Essential hypertension   • Current mild episode of major depressive disorder without prior episode (HCC)   • Class 3 severe obesity due to excess calories with serious comorbidity and body mass index (BMI) of 40.0 to 44.9 in adult [facility])   • Gastroesophageal reflux disease   • Complex tear of medial meniscus of right knee as current injury   • Stage 3a chronic kidney disease ([address])   • MURRAY (dyspnea on exertion)   • Mixed hyperlipidemia   • Primary osteoarthritis of both knees   • JANEY (obstructive sleep apnea)   • Seasonal allergies   • Type 2 diabetes mellitus, without long-term current use of insulin ([facility])   • History of atrial fibrillation   • Restless legs   • Disorder of patellofemoral joint   • Other tear of medial meniscus, current injury, left knee, initial encounter           Plan      · Mri of the left knee was reviewed today with the patient  · On exam, the pain is located over the medial aspect of the knee. · A left knee arthroscopy partial medial menisectomy with all associated procedures.   · The benefits and purpose of the operation and /or procedure have been explained to me in language I understand by Dr. Mone Rodriguez as well the risks, alternatives and complications pertaining to the above procedure/surgery which include but is not limited to: infections, deep vein thrombosis,blood clots to the lungs, wound healing problems, complications from extensive blood loss, including shock, possible death, failure of repair, potential need for repeat surgery, recurrent tear, persistent/continued pain, loss of motion/strengthen/function, stiffness, no relief of symptoms, worsening arthritis, vascular and/or nerve injury and not obtaining desired results. · Encourage the patient to walk but avoid kneeling, bending, squatting for at least 4 weeks. It takes approximately that long for the synovial fluid to re-accumulate in the joint   · Patient understands and wishes to proceed with the surgery. Subjective:     Patient ID:    Chief Complaint:Tobi Hoffmann 58 y.o. male      HPI    Today the patient is here to discuss an MRI of the left knee to further evaluate for medial meniscus tear. At his last visit, a cortisone injection as declined but has been performing her HEP. He has had increase pain, catching, clicking in the knee. Increase pain and these symptoms when he changes sleep positions. He does take Tylenol as needed. He wanted to wait for the MRI results prior to starting formal PT. He has a hx of right knee Arthroscopy PMM by Dr. Sharon Rene, 8/5/2021. The following portions of the patient's history were reviewed and updated as appropriate: allergies, current medications, past family history, past social history, past surgical history and problem list.    All organ systems normal except: left knee    Social History     Socioeconomic History   • Marital status: /Civil Union     Spouse name: Not on file   • Number of children: 2   • Years of education: Not on file   • Highest education level: Not on file   Occupational History   • Occupation: [employer] / [employer]   Tobacco Use   • Smoking status: Never   • Smokeless tobacco: Never   Vaping Use   • Vaping Use: Never used   Substance and Sexual Activity   • Alcohol use:  Yes     Alcohol/week: 2.0 standard drinks of alcohol     Types: 2 Standard drinks or equivalent per week     Comment: once a month if that   • Drug use: Never   • Sexual activity: Not Currently     Partners: Female   Other Topics Concern   • Not on file   Social History Narrative        Lives with wife and grandson    2 Children - 2 Sons    Retired -  / [employer] Determinants of Health     Financial Resource Strain: Low Risk  (2/21/2023)    Overall Financial Resource Strain (CARDIA)    • Difficulty of Paying Living Expenses: Not hard at all   Food Insecurity: No Food Insecurity (2/11/2021)    Hunger Vital Sign    • Worried About Running Out of Food in the Last Year: Never true    • Ran Out of Food in the Last Year: Never true   Transportation Needs: No Transportation Needs (2/21/2023)    PRAPARE - Transportation    • Lack of Transportation (Medical): No    • Lack of Transportation (Non-Medical):  No   Physical Activity: Inactive (5/13/2021)    Exercise Vital Sign    • Days of Exercise per Week: 0 days    • Minutes of Exercise per Session: 0 min   Stress: No Stress Concern Present (5/13/2021)    [address] [facility]    • Feeling of Stress : Not at all   Social Connections: Not on file   Intimate Partner Violence: Not on file   Housing Stability: Not on file     Past Medical History:   Diagnosis Date   • Chronic kidney disease 04/20   • Depression 04/20   • GERD (gastroesophageal reflux disease)    • History of atrial fibrillation     s/p ablation   • Hyperlipidemia     ldl 123 from 179 from 138, -. zocor    • Hypertension    • Morbid obesity ([address])    • Nocturia    • Osteoarthritis    • Restless legs    • Seasonal allergies    • Sleep apnea     no cpap   • Type 2 diabetes mellitus, without long-term current use of insulin ([address]) 09/11/2023   • Visual impairment 1970    Not wearing glasses     Past Surgical History:   Procedure Laterality Date   • CARDIAC ELECTROPHYSIOLOGY MAPPING AND ABLATION  2019   • COLONOSCOPY  01/01/2012    no polyps   • COLONOSCOPY     • PALATE / UVULA BIOPSY / EXCISION      JANEY   • AL ARTHRS KNE SURG W/MENISCECTOMY MED/LAT W/SHVG Right 08/05/2021    Procedure: KNEE ARTHROSCOPIC PARTIAL MEDIAL MENISCECTOMY, CHONDROPLASTY;  Surgeon: Renetta Palma MD;  [facility]: [facility] MAIN OR;  Service: Orthopedics   • SHOULDER SURGERY Bilateral     s/p 4 surgery - 2 on each side. No Known Allergies  Current Outpatient Medications on File Prior to Visit   Medication Sig Dispense Refill   • Accu-Chek Guide test strip      • Accu-Chek Softclix Lancets lancets      • aspirin (ECOTRIN LOW STRENGTH) 81 mg EC tablet Take 81 mg by mouth daily     • Blood Glucose Monitoring Suppl (Accu-Chek Guide Me) w/Device KIT      • famotidine (PEPCID) 20 mg tablet TAKE 1 TABLET BY MOUTH TWICE A  tablet 1   • lisinopril (ZESTRIL) 20 mg tablet TAKE 1 TABLET BY MOUTH EVERY DAY IN THE MORNING (Patient taking differently: Take 20 mg by mouth daily) 90 tablet 1   • loratadine (CLARITIN) 10 mg tablet TAKE 1 TABLET BY MOUTH EVERY DAY (Patient taking differently: Take 10 mg by mouth daily as needed for allergies) 90 tablet 1   • metFORMIN (GLUCOPHAGE) 850 mg tablet Take 1 tablet (850 mg total) by mouth 2 (two) times a day with meals (Patient taking differently: Take 850 mg by mouth 2 (two) times a day with meals) 180 tablet 2   • metoprolol tartrate (LOPRESSOR) 50 mg tablet Take 1 tablet (50 mg total) by mouth 2 (two) times a day 180 tablet 0   • rOPINIRole (REQUIP) 1 mg tablet TAKE 1 TABLET BY MOUTH EVERYDAY AT BEDTIME (Patient taking differently: Take 1 mg by mouth daily at bedtime) 90 tablet 1   • simvastatin (ZOCOR) 40 mg tablet Take 1 tablet (40 mg total) by mouth daily at bedtime 90 tablet 0   • tamsulosin (FLOMAX) 0.4 mg TAKE 1 CAPSULE BY MOUTH EVERY DAY WITH DINNER (Patient taking differently: Take 0.4 mg by mouth daily with dinner) 90 capsule 1     No current facility-administered medications on file prior to visit. Objective:        Left Knee Exam     Tenderness   The patient is experiencing tenderness in the medial joint line (baker's cyst felt).     Range of Motion   Extension: 0   Flexion: 120     Tests   Varus: negative Valgus: negative    Other   Erythema: absent  Sensation: normal  Pulse: present  Swelling: none  Effusion: no effusion present    Comments:  Calf is soft and non tender          I have personally reviewed pertinent films in PACS and my interpretation is MRI of the left knee: Complex tear body and posterior horn of the medial meniscus with mild medial extrusion. .    Portions of the record may have been created with voice recognition software.  Occasional wrong word or "sound a like" substitutions may have occurred due to the inherent limitations of voice recognition software.  Read the chart carefully and recognize, using context, where substitutions have occurred.

## 2023-10-05 NOTE — PROGRESS NOTES
Assessment:  1. Other tear of medial meniscus, current injury, left knee, initial encounter        2. Pre-op testing        3. Type 2 diabetes mellitus without complication, without long-term current use of insulin [facility])          Patient Active Problem List   Diagnosis   • Essential hypertension   • Current mild episode of major depressive disorder without prior episode (HCC)   • Class 3 severe obesity due to excess calories with serious comorbidity and body mass index (BMI) of 40.0 to 44.9 in adult [facility])   • Gastroesophageal reflux disease   • Complex tear of medial meniscus of right knee as current injury   • Stage 3a chronic kidney disease ([address])   • MURRAY (dyspnea on exertion)   • Mixed hyperlipidemia   • Primary osteoarthritis of both knees   • JANEY (obstructive sleep apnea)   • Seasonal allergies   • Type 2 diabetes mellitus, without long-term current use of insulin ([facility])   • History of atrial fibrillation   • Restless legs   • Disorder of patellofemoral joint   • Other tear of medial meniscus, current injury, left knee, initial encounter           Plan      · Mri of the left knee was reviewed today with the patient  · On exam, the pain is located over the medial aspect of the knee. · A left knee arthroscopy partial medial menisectomy with all associated procedures.   · The benefits and purpose of the operation and /or procedure have been explained to me in language I understand by Dr. Juma Cheung as well the risks, alternatives and complications pertaining to the above procedure/surgery which include but is not limited to: infections, deep vein thrombosis,blood clots to the lungs, wound healing problems, complications from extensive blood loss, including shock, possible death, failure of repair, potential need for repeat surgery, recurrent tear, persistent/continued pain, loss of motion/strengthen/function, stiffness, no relief of symptoms, worsening arthritis, vascular and/or nerve injury and not obtaining desired results. · Encourage the patient to walk but avoid kneeling, bending, squatting for at least 4 weeks. It takes approximately that long for the synovial fluid to re-accumulate in the joint   · Patient understands and wishes to proceed with the surgery. Subjective:     Patient ID:    Chief Complaint:Tobi Hoffmann 58 y.o. male      HPI    Today the patient is here to discuss an MRI of the left knee to further evaluate for medial meniscus tear. At his last visit, a cortisone injection as declined but has been performing her HEP. He has had increase pain, catching, clicking in the knee. Increase pain and these symptoms when he changes sleep positions. He does take Tylenol as needed. He wanted to wait for the MRI results prior to starting formal PT. He has a hx of right knee Arthroscopy PMM by Dr. Tanya Russo, 8/5/2021. The following portions of the patient's history were reviewed and updated as appropriate: allergies, current medications, past family history, past social history, past surgical history and problem list.    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 [facility] [facility] Determinants of Health     Financial Resource Strain: Low Risk  (2/21/2023)    Overall Financial Resource Strain (CARDIA)    • Difficulty of Paying Living Expenses: Not hard at all   Food Insecurity: No Food Insecurity (2/11/2021)    Hunger Vital Sign    • Worried About Running Out of Food in the Last Year: Never true    • Ran Out of Food in the Last Year: Never true   Transportation Needs: No Transportation Needs (2/21/2023)    PRAPARE - Transportation    • Lack of Transportation (Medical): No    • Lack of Transportation (Non-Medical):  No   Physical Activity: Inactive (5/13/2021)    Exercise Vital Sign    • Days of Exercise per Week: 0 days    • Minutes of Exercise per Session: 0 min   Stress: No Stress Concern Present (5/13/2021)    [facility]    • Feeling of Stress : Not at all   Social Connections: Not on file   Intimate Partner Violence: Not on file   Housing Stability: Not on file     Past Medical History:   Diagnosis Date   • Chronic kidney disease 04/20   • Depression 04/20   • GERD (gastroesophageal reflux disease)    • History of atrial fibrillation     s/p ablation   • Hyperlipidemia     ldl 123 from 179 from 138, -. zocor    • Hypertension    • Morbid obesity ([address])    • Nocturia    • Osteoarthritis    • Restless legs    • Seasonal allergies    • Sleep apnea     no cpap   • Type 2 diabetes mellitus, without long-term current use of insulin ([address]) 09/11/2023   • Visual impairment 1970    Not wearing glasses     Past Surgical History:   Procedure Laterality Date   • CARDIAC ELECTROPHYSIOLOGY MAPPING AND ABLATION  2019   • COLONOSCOPY  01/01/2012    no polyps   • COLONOSCOPY     • PALATE / UVULA BIOPSY / EXCISION      JANEY   • [place] ARTHRS KNE SURG W/MENISCECTOMY MED/LAT W/SHVG Right 08/05/2021    Procedure: KNEE ARTHROSCOPIC PARTIAL MEDIAL MENISCECTOMY, CHONDROPLASTY;  Surgeon: Adonay Andrade MD;  Location: [facility] MAIN OR;  Service: Orthopedics   • SHOULDER SURGERY Bilateral     s/p 4 surgery - 2 on each side. No Known Allergies  Current Outpatient Medications on File Prior to Visit   Medication Sig Dispense Refill   • Accu-Chek Guide test strip      • Accu-Chek Softclix Lancets lancets      • aspirin (ECOTRIN LOW STRENGTH) 81 mg EC tablet Take 81 mg by mouth daily     • Blood Glucose Monitoring Suppl (Accu-Chek Guide Me) w/Device KIT      • famotidine (PEPCID) 20 mg tablet TAKE 1 TABLET BY MOUTH TWICE A  tablet 1   • lisinopril (ZESTRIL) 20 mg tablet TAKE 1 TABLET BY MOUTH EVERY DAY IN THE MORNING (Patient taking differently: Take 20 mg by mouth daily) 90 tablet 1   • loratadine (CLARITIN) 10 mg tablet TAKE 1 TABLET BY MOUTH EVERY DAY (Patient taking differently: Take 10 mg by mouth daily as needed for allergies) 90 tablet 1   • metFORMIN (GLUCOPHAGE) 850 mg tablet Take 1 tablet (850 mg total) by mouth 2 (two) times a day with meals (Patient taking differently: Take 850 mg by mouth 2 (two) times a day with meals) 180 tablet 2   • metoprolol tartrate (LOPRESSOR) 50 mg tablet Take 1 tablet (50 mg total) by mouth 2 (two) times a day 180 tablet 0   • rOPINIRole (REQUIP) 1 mg tablet TAKE 1 TABLET BY MOUTH EVERYDAY AT BEDTIME (Patient taking differently: Take 1 mg by mouth daily at bedtime) 90 tablet 1   • simvastatin (ZOCOR) 40 mg tablet Take 1 tablet (40 mg total) by mouth daily at bedtime 90 tablet 0   • tamsulosin (FLOMAX) 0.4 mg TAKE 1 CAPSULE BY MOUTH EVERY DAY WITH DINNER (Patient taking differently: Take 0.4 mg by mouth daily with dinner) 90 capsule 1     No current facility-administered medications on file prior to visit. Objective:        Left Knee Exam     Tenderness   The patient is experiencing tenderness in the medial joint line (baker's cyst felt).     Range of Motion   Extension: 0   Flexion: 120     Tests   Varus: negative Valgus: negative    Other   Erythema: absent  Sensation: normal  Pulse: present  Swelling: none  Effusion: no effusion present    Comments:  Calf is soft and non tender          I have personally reviewed pertinent films in PACS and my interpretation is MRI of the left knee: Complex tear body and posterior horn of the medial meniscus with mild medial extrusion. .    Portions of the record may have been created with voice recognition software.  Occasional wrong word or "sound a like" substitutions may have occurred due to the inherent limitations of voice recognition software.  Read the chart carefully and recognize, using context, where substitutions have occurred.

## 2023-10-06 ENCOUNTER — ANESTHESIA EVENT (OUTPATIENT)
Dept: PERIOP | Facility: AMBULARY SURGERY CENTER | Age: 63
End: 2023-10-06
Payer: COMMERCIAL

## 2023-10-06 NOTE — PROGRESS NOTES
Presurgical Evaluation    Subjective:      Patient ID: Favian Zepeda is a 58 y.o. male. Chief Complaint   Patient presents with   • Pre-op Exam     10/17, needs ekg       HPI    The following preoperative consultation was requested by the performing surgeon for risk review and reduction. A copy of this consultation will be provided to the requesting surgeon upon completion of the encounter. The following portions of the patient's history were reviewed and updated as appropriate: allergies, current medications, past family history, past medical history, past social history, past surgical history, and problem list.      Case: [id number] Date/Time: 10/17/23 1220   Procedure: ARTHROSCOPY KNEE PARTIAL MEDIAL MENISECTOMY WITH ALL ASSOCIATED PROCEDURES (Left: Knee)   Anesthesia type: Choice   Diagnosis:        Other tear of medial meniscus, current injury, left knee, initial encounter [S83.242A]       Pre-op testing [Z01.818]   Pre-op diagnosis:        Other tear of medial meniscus, current injury, left knee, initial encounter [I63.186O]       DOJ-YY testing [Z01.818]   Location: AN [facility] OR ROOM 06 / [address] [facility]   Surgeons: Doug Patel DO         Prior anesthesia: Yes   General; Complications:  None / Tolerated well    CAD History: Arrhythmia  A-Fib   NOTE: Patient should see Cardiology if time available before surgery, and if appropriate. Pulmonary History: JANEY (Sleep Apnea)  Not using CPAP. Renal history: CKD stage 2 - GFR 60-89    Diabetes History:  Type 2  Controlled     Neurological History: None     On Immunosuppressant meds/biologics: No      Review of Systems   Constitutional:  Positive for fatigue. Negative for appetite change, chills, diaphoresis and fever. Respiratory:  Negative for chest tightness and shortness of breath. Cardiovascular:  Negative for chest pain.    Gastrointestinal:  Positive for vomiting (x 1 today ; vomits 3-4 times per month, unsure if due to hot shower, occurs c sun exposure). Negative for abdominal pain, blood in stool, diarrhea and nausea. Genitourinary:  Negative for dysuria. Musculoskeletal:  Positive for arthralgias. Current Outpatient Medications   Medication Sig Dispense Refill   • Accu-Chek Guide test strip      • Accu-Chek Softclix Lancets lancets      • Blood Glucose Monitoring Suppl (Accu-Chek Guide Me) w/Device KIT TEST ONCE DAILY AS DIRECTED 1 kit 0   • famotidine (PEPCID) 20 mg tablet TAKE 1 TABLET BY MOUTH TWICE A  tablet 1   • lisinopril (ZESTRIL) 20 mg tablet TAKE 1 TABLET BY MOUTH EVERY DAY IN THE MORNING (Patient taking differently: Take 20 mg by mouth daily) 90 tablet 1   • loratadine (CLARITIN) 10 mg tablet TAKE 1 TABLET BY MOUTH EVERY DAY (Patient taking differently: Take 10 mg by mouth daily as needed for allergies) 90 tablet 1   • metFORMIN (GLUCOPHAGE) 850 mg tablet Take 1 tablet (850 mg total) by mouth 2 (two) times a day with meals (Patient taking differently: Take 850 mg by mouth 2 (two) times a day with meals) 180 tablet 2   • metoprolol tartrate (LOPRESSOR) 50 mg tablet Take 1 tablet (50 mg total) by mouth 2 (two) times a day 180 tablet 0   • rOPINIRole (REQUIP) 1 mg tablet TAKE 1 TABLET BY MOUTH EVERYDAY AT BEDTIME (Patient taking differently: Take 1 mg by mouth daily at bedtime) 90 tablet 1   • simvastatin (ZOCOR) 40 mg tablet Take 1 tablet (40 mg total) by mouth daily at bedtime 90 tablet 0   • tamsulosin (FLOMAX) 0.4 mg TAKE 1 CAPSULE BY MOUTH EVERY DAY WITH DINNER (Patient taking differently: Take 0.4 mg by mouth daily with dinner) 90 capsule 1     No current facility-administered medications for this visit. Allergies on file:   Patient has no known allergies.     Patient Active Problem List   Diagnosis   • Essential hypertension   • Current mild episode of major depressive disorder without prior episode (HCC)   • Class 3 severe obesity due to excess calories with serious comorbidity and body mass index (BMI) of 40.0 to 44.9 in adult [facility])   • Gastroesophageal reflux disease   • Complex tear of medial meniscus of right knee as current injury   • Stage 3a chronic kidney disease ([address])   • MURRAY (dyspnea on exertion)   • Mixed hyperlipidemia   • Primary osteoarthritis of both knees   • JANEY (obstructive sleep apnea)   • Seasonal allergies   • Type 2 diabetes mellitus, without long-term current use of insulin ([facility])   • History of atrial fibrillation   • Restless legs   • Disorder of patellofemoral joint   • Other tear of medial meniscus, current injury, left knee, initial encounter        Past Medical History:   Diagnosis Date   • Chronic kidney disease 04/20   • Depression 04/20   • GERD (gastroesophageal reflux disease)    • History of atrial fibrillation     s/p ablation   • Hyperlipidemia     ldl 123 from 179 from 138, -. zocor    • Hypertension    • Irregular heart beat     Afib   • Morbid obesity ([address])    • Nocturia    • Osteoarthritis    • Restless legs    • Seasonal allergies    • Sleep apnea     no cpap   • Type 2 diabetes mellitus, without long-term current use of insulin ([address]) 09/11/2023   • Visual impairment 1970    Not wearing glasses       Past Surgical History:   Procedure Laterality Date   • CARDIAC ELECTROPHYSIOLOGY MAPPING AND ABLATION  2019   • CARDIAC SURGERY      Ablation   • COLONOSCOPY  01/01/2012    no polyps   • COLONOSCOPY     • PALATE / UVULA BIOPSY / EXCISION      JANEY   • [place] ARTHRS KNE SURG W/MENISCECTOMY MED/LAT W/SHVG Right 08/05/2021    Procedure: KNEE ARTHROSCOPIC PARTIAL MEDIAL MENISCECTOMY, CHONDROPLASTY;  Surgeon: Dafne Gunderson MD;  Location: [facility] MAIN OR;  Service: Orthopedics   • SHOULDER SURGERY Bilateral     s/p 4 surgery - 2 on each side.        Family History   Problem Relation Age of Onset   • Ovarian cancer Mother 76   • Coronary artery disease Father 28   • Heart attack Father 28   • Diabetes Sister    • Obesity Sister    • Hypertension Sister    • No Known Problems Sister    • Pancreatic cancer Brother    • Heart attack Brother 27   • Coronary artery disease Brother 27   • Heart attack Brother 39   • Coronary artery disease Brother 39   • Coronary artery disease Brother 36   • Obesity Brother    • Obesity Brother    • Diabetes Brother    • No Known Problems Brother    • No Known Problems Son    • No Known Problems Son        Social History     Tobacco Use   • Smoking status: Never   • Smokeless tobacco: Never   Vaping Use   • Vaping Use: Never used   Substance Use Topics   • Alcohol use: Yes     Alcohol/week: 2.0 standard drinks of alcohol     Types: 2 Standard drinks or equivalent per week   • Drug use: Never       Objective:    Vitals:    10/11/23 1409   BP: 112/74   Pulse: 74   Resp: 20   Temp: 97.9 °F (36.6 °C)   TempSrc: Temporal   SpO2: 97%   Weight: 134 kg (296 lb)   Height: 5' 9" (1.753 m)        Physical Exam  Vitals and nursing note reviewed. Constitutional:       Appearance: Normal appearance. He is well-developed. He is obese. HENT:      Head: Normocephalic and atraumatic. Right Ear: Tympanic membrane, ear canal and external ear normal.      Left Ear: Tympanic membrane, ear canal and external ear normal.      Nose: Nose normal.      Right Sinus: No maxillary sinus tenderness or frontal sinus tenderness. Left Sinus: No maxillary sinus tenderness or frontal sinus tenderness. Mouth/Throat:      Mouth: Mucous membranes are moist.      Pharynx: Oropharynx is clear. Uvula midline. Tonsils: No tonsillar exudate. Eyes:      Extraocular Movements: Extraocular movements intact. Conjunctiva/sclera: Conjunctivae normal.      Pupils: Pupils are equal, round, and reactive to light. Cardiovascular:      Rate and Rhythm: Normal rate and regular rhythm. Pulses: Normal pulses. Heart sounds: Normal heart sounds. Pulmonary:      Effort: Pulmonary effort is normal.      Breath sounds: Normal breath sounds.    Abdominal: General: Bowel sounds are normal. There is no distension. Palpations: Abdomen is soft. There is no mass. Tenderness: There is no abdominal tenderness. There is no guarding or rebound. Musculoskeletal:         General: No swelling or tenderness. Cervical back: Neck supple. Right lower leg: No edema. Left lower leg: No edema. Lymphadenopathy:      Cervical: No cervical adenopathy. Skin:     Findings: No rash. Neurological:      General: No focal deficit present. Mental Status: He is alert and oriented to person, place, and time. Psychiatric:         Mood and Affect: Mood normal.         Behavior: Behavior normal.         Thought Content: Thought content normal.         Judgment: Judgment normal.           Preop labs/testing available and reviewed: yes 10/9/23    eGFR   Date Value Ref Range Status   10/09/2023 61 ml/min/1.73sq m Final     WBC   Date Value Ref Range Status   10/09/2023 5.39 4.31 - 10.16 Thousand/uL Final     Hemoglobin   Date Value Ref Range Status   10/09/2023 14.5 12.0 - 17.0 g/dL Final     Hematocrit   Date Value Ref Range Status   10/09/2023 47.5 36.5 - 49.3 % Final     Platelets   Date Value Ref Range Status   10/09/2023 251 149 - 390 Thousands/uL Final          EKG yes  10/11/23    Normal sinus rhythm at 64 bpm.  Left axis deviation. Normal intervals. T wave inversion in lead III. Flat T waves in V1. No acute ST elevation or depression. No changes compared to prior EKG 8/3/2021. Echo no    Stress test/cath no    PFT/Blas no    Functional capacity: Climb stairs                        4 Mets   Pick the highest level patient can comfortably perform   4 mets or greater for surgery    RCRI  High Risk surgery? 1 Point  CAD History:         1 Point   MI; Positive Stress Test; CP due to Mi;  Nitrate Usage to control Angina;  Pathologic Q wave on EKG  CHF Active:         1 Point   Pulm Edema; Paroxysmal Nocturnal Dyspnea;  Bibasilar Rales (crackles);S3; CHF on CXR  Cerebrovascular Disease (TIA or CVA):     1 Point  DM on Insulin:        1 Point  Serum Creat >2.0 mg/dl:       1 Point          Total Points: 1     Scorin: Class I, Very Low Risk (0.4%)     1: Class II, Low risk (0.9%)     2: Class III Moderate (6.6%)     3: Class IV High (>11%)      ISAÍAS Risk:  GFR:   eGFR   Date Value Ref Range Status   10/09/2023 61 ml/min/1.73sq m Final         For PCP:  If GFR>60, Hold ACE/ARB/Diuretic on the day of surgery, and NSAIDS 10 days before. If GFR<45, Consider PRE and POST op Nephrology Consult. If 46 <GFR> 59 : Has Patient had ISAÍAS in last 6 Months? no   If YES: Preop Nephrology consult   If No:  [id number] Kyler Hoang coretta Nephrology consult. Assessment/Plan:    Patient is medically optimized (cleared) for the planned procedure. Further testing/evaluation is not required. Postop concerns: no    - Hold metformin the morning of surgery and do not resume until 48 hours AFTER surgery to avoid risk of lactic acidosis. Do not resume if eGFR is < 30    _ Hold Lisinopril 20mg daily Hold on the day of surgery. Resume when ok per surgery. Problem List Items Addressed This Visit        Endocrine    Type 2 diabetes mellitus, without long-term current use of insulin ([address])       Lab Results   Component Value Date    HGBA1C 6.3 (H) 10/09/2023     Stable. Continue Metformin. To consider GLP-1 agonist or SGLT-2 inhibitor in future? Patient declines DM education 23. Recommend lifestyle modifications. Goal blood sugars:  Fasting in AM - Less than 100. 2 hours after any meal - Less than 140. Respiratory    JANEY (obstructive sleep apnea)     Patient intolerant of CPAP. He is interested in Exotel. Cardiovascular and Mediastinum    Essential hypertension     Stable. Check blood pressure outside of office. Recommend lifestyle modifications.          Relevant Orders    POCT ECG (Completed)       Musculoskeletal and Integument Other tear of medial meniscus, current injury, left knee, initial encounter     Management per Ortho. Genitourinary    Stage 3a chronic kidney disease [facility])     Lab Results   Component Value Date    EGFR 61 10/09/2023    EGFR 53 05/15/2023    EGFR 48 02/20/2023    CREATININE 1.23 10/09/2023    CREATININE 1.40 (H) 05/15/2023    CREATININE 1.51 (H) 02/20/2023     Stable. Other    History of atrial fibrillation     Stable s/p ablation. Pending Cardio consult. Relevant Orders    POCT ECG (Completed)   Other Visit Diagnoses     Pre-op evaluation    -  Primary    Relevant Orders    POCT ECG (Completed)           Diagnoses and all orders for this visit:    Pre-op evaluation  -     POCT ECG    Other tear of medial meniscus, current injury, left knee, initial encounter    Type 2 diabetes mellitus without complication, without long-term current use of insulin (HCC)    Essential hypertension  -     POCT ECG    Stage 3a chronic kidney disease (HCC)    JANEY (obstructive sleep apnea)    History of atrial fibrillation  -     POCT ECG          No outpatient medications have been marked as taking for the 10/11/23 encounter (Appointment) with Jersey Valderrama DO. NOTE: Please use the above to review important meds for your specialty, the remainder "per anesthesia Guidelines."    NOTE: Please place an Inbasket message for "[facility]" pool for complicated patients.

## 2023-10-06 NOTE — PRE-PROCEDURE INSTRUCTIONS
Pre-Surgery Instructions:   Medication Instructions   • aspirin (ECOTRIN LOW STRENGTH) 81 mg EC tablet Instructions provided by MD   • famotidine (PEPCID) 20 mg tablet Instructed to take per normal schedule including DOS with sips water   • lisinopril (ZESTRIL) 20 mg tablet Instructed to take per normal schedule except DOS   • loratadine (CLARITIN) 10 mg tablet Instructed to take as needed including DOS with sips water   • metFORMIN (GLUCOPHAGE) 850 mg tablet Instructed to take per normal schedule except DOS   • metoprolol tartrate (LOPRESSOR) 50 mg tablet Instructed to take per normal schedule including DOS with sips water   • rOPINIRole (REQUIP) 1 mg tablet Take per normal schedule HS   • simvastatin (ZOCOR) 40 mg tablet Instructed to take per normal schedule including DOS   • tamsulosin (FLOMAX) 0.4 mg Instructed to take per normal schedule     Medication instructions for day surgery reviewed. Please use only a sip of water to take your instructed medications. Avoid all over the counter vitamins, supplements and NSAIDS for one week prior to surgery per anesthesia guidelines. Tylenol is ok to take as needed. ASA instructions per MD     You will receive a call one business day prior to surgery with an arrival time and hospital directions. If your surgery is scheduled on a Monday, the hospital will be calling you on the Friday prior to your surgery. If you have not heard from anyone by 8pm, please call the hospital supervisor through the hospital  at [phone]. Trenton Henryalds [phone]). Do not eat or drink anything after midnight the night before your surgery, including candy, mints, lifesavers, or chewing gum. Do not drink alcohol 24hrs before your surgery. Try not to smoke at least 24hrs before your surgery. Follow the pre surgery showering instructions as listed in the [facility] Surgical Experience Booklet” or otherwise provided by your surgeon's office.  Do not shave the surgical area 24 hours before surgery. Do not apply any lotions, creams, including makeup, cologne, deodorant, or perfumes after showering on the day of your surgery. No contact lenses, eye make-up, or artificial eyelashes. Remove nail polish, including gel polish, and any artificial, gel, or acrylic nails if possible. Remove all jewelry including rings and body piercing jewelry. Wear causal clothing that is easy to take on and off. Consider your type of surgery. Keep any valuables, jewelry, piercings at home. Please bring any specially ordered equipment (sling, braces) if indicated. Arrange for a responsible person to drive you to and from the hospital on the day of your surgery. Visitor Guidelines discussed. Call the surgeon's office with any new illnesses, exposures, or additional questions prior to surgery. Please reference your [facility] Surgical Experience Booklet” for additional information to prepare for your upcoming surgery.

## 2023-10-09 ENCOUNTER — LAB (OUTPATIENT)
Dept: LAB | Age: 63
End: 2023-10-09
Payer: COMMERCIAL

## 2023-10-09 DIAGNOSIS — N18.31 STAGE 3A CHRONIC KIDNEY DISEASE (HCC): ICD-10-CM

## 2023-10-09 DIAGNOSIS — F32.0 CURRENT MILD EPISODE OF MAJOR DEPRESSIVE DISORDER WITHOUT PRIOR EPISODE (HCC): ICD-10-CM

## 2023-10-09 DIAGNOSIS — Z11.4 ENCOUNTER FOR SCREENING FOR HIV: ICD-10-CM

## 2023-10-09 DIAGNOSIS — E78.2 MIXED HYPERLIPIDEMIA: ICD-10-CM

## 2023-10-09 DIAGNOSIS — Z01.818 PRE-OP TESTING: ICD-10-CM

## 2023-10-09 DIAGNOSIS — I10 ESSENTIAL HYPERTENSION: ICD-10-CM

## 2023-10-09 DIAGNOSIS — E66.01 CLASS 3 SEVERE OBESITY DUE TO EXCESS CALORIES WITH SERIOUS COMORBIDITY AND BODY MASS INDEX (BMI) OF 40.0 TO 44.9 IN ADULT (HCC): ICD-10-CM

## 2023-10-09 DIAGNOSIS — E11.9 TYPE 2 DIABETES MELLITUS WITHOUT COMPLICATION, WITHOUT LONG-TERM CURRENT USE OF INSULIN (HCC): ICD-10-CM

## 2023-10-09 DIAGNOSIS — K21.9 GASTROESOPHAGEAL REFLUX DISEASE, UNSPECIFIED WHETHER ESOPHAGITIS PRESENT: ICD-10-CM

## 2023-10-09 DIAGNOSIS — E11.9 TYPE 2 DIABETES MELLITUS WITHOUT COMPLICATION, WITHOUT LONG-TERM CURRENT USE OF INSULIN (HCC): Primary | ICD-10-CM

## 2023-10-09 DIAGNOSIS — Z86.79 HISTORY OF ATRIAL FIBRILLATION: ICD-10-CM

## 2023-10-09 DIAGNOSIS — G25.81 RESTLESS LEGS: ICD-10-CM

## 2023-10-09 DIAGNOSIS — Z11.59 NEED FOR HEPATITIS C SCREENING TEST: ICD-10-CM

## 2023-10-09 LAB
25(OH)D3 SERPL-MCNC: 21.6 NG/ML (ref 30–100)
ALBUMIN SERPL BCP-MCNC: 3.6 G/DL (ref 3.5–5)
ALP SERPL-CCNC: 50 U/L (ref 34–104)
ALT SERPL W P-5'-P-CCNC: 21 U/L (ref 7–52)
ANION GAP SERPL CALCULATED.3IONS-SCNC: 9 MMOL/L
AST SERPL W P-5'-P-CCNC: 20 U/L (ref 13–39)
BASOPHILS # BLD AUTO: 0.05 THOUSANDS/ÂΜL (ref 0–0.1)
BASOPHILS NFR BLD AUTO: 1 % (ref 0–1)
BILIRUB SERPL-MCNC: 0.55 MG/DL (ref 0.2–1)
BUN SERPL-MCNC: 18 MG/DL (ref 5–25)
CALCIUM SERPL-MCNC: 9.6 MG/DL (ref 8.4–10.2)
CHLORIDE SERPL-SCNC: 103 MMOL/L (ref 96–108)
CO2 SERPL-SCNC: 29 MMOL/L (ref 21–32)
CREAT SERPL-MCNC: 1.23 MG/DL (ref 0.6–1.3)
CREAT UR-MCNC: 336.8 MG/DL
EOSINOPHIL # BLD AUTO: 0.14 THOUSAND/ÂΜL (ref 0–0.61)
EOSINOPHIL NFR BLD AUTO: 3 % (ref 0–6)
ERYTHROCYTE [DISTWIDTH] IN BLOOD BY AUTOMATED COUNT: 14.6 % (ref 11.6–15.1)
EST. AVERAGE GLUCOSE BLD GHB EST-MCNC: 134 MG/DL
GFR SERPL CREATININE-BSD FRML MDRD: 61 ML/MIN/1.73SQ M
GLUCOSE P FAST SERPL-MCNC: 105 MG/DL (ref 65–99)
HBA1C MFR BLD: 6.3 %
HCT VFR BLD AUTO: 47.5 % (ref 36.5–49.3)
HCV AB SER QL: NORMAL
HGB BLD-MCNC: 14.5 G/DL (ref 12–17)
HIV 1+2 AB+HIV1 P24 AG SERPL QL IA: NORMAL
HIV 2 AB SERPL QL IA: NORMAL
HIV1 AB SERPL QL IA: NORMAL
HIV1 P24 AG SERPL QL IA: NORMAL
IMM GRANULOCYTES # BLD AUTO: 0.01 THOUSAND/UL (ref 0–0.2)
IMM GRANULOCYTES NFR BLD AUTO: 0 % (ref 0–2)
LDLC SERPL DIRECT ASSAY-MCNC: 108 MG/DL (ref 0–100)
LYMPHOCYTES # BLD AUTO: 1.91 THOUSANDS/ÂΜL (ref 0.6–4.47)
LYMPHOCYTES NFR BLD AUTO: 35 % (ref 14–44)
MAGNESIUM SERPL-MCNC: 1.9 MG/DL (ref 1.9–2.7)
MCH RBC QN AUTO: 29.1 PG (ref 26.8–34.3)
MCHC RBC AUTO-ENTMCNC: 30.5 G/DL (ref 31.4–37.4)
MCV RBC AUTO: 95 FL (ref 82–98)
MICROALBUMIN UR-MCNC: 51.2 MG/L
MICROALBUMIN/CREAT 24H UR: 15 MG/G CREATININE (ref 0–30)
MONOCYTES # BLD AUTO: 0.37 THOUSAND/ÂΜL (ref 0.17–1.22)
MONOCYTES NFR BLD AUTO: 7 % (ref 4–12)
NEUTROPHILS # BLD AUTO: 2.91 THOUSANDS/ÂΜL (ref 1.85–7.62)
NEUTS SEG NFR BLD AUTO: 54 % (ref 43–75)
NRBC BLD AUTO-RTO: 0 /100 WBCS
PLATELET # BLD AUTO: 251 THOUSANDS/UL (ref 149–390)
PMV BLD AUTO: 10 FL (ref 8.9–12.7)
POTASSIUM SERPL-SCNC: 4.2 MMOL/L (ref 3.5–5.3)
PROT SERPL-MCNC: 7.7 G/DL (ref 6.4–8.4)
RBC # BLD AUTO: 4.99 MILLION/UL (ref 3.88–5.62)
SODIUM SERPL-SCNC: 141 MMOL/L (ref 135–147)
TSH SERPL DL<=0.05 MIU/L-ACNC: 0.99 UIU/ML (ref 0.45–4.5)
WBC # BLD AUTO: 5.39 THOUSAND/UL (ref 4.31–10.16)

## 2023-10-09 PROCEDURE — 86803 HEPATITIS C AB TEST: CPT

## 2023-10-09 PROCEDURE — 83735 ASSAY OF MAGNESIUM: CPT

## 2023-10-09 PROCEDURE — 82306 VITAMIN D 25 HYDROXY: CPT

## 2023-10-09 PROCEDURE — 87389 HIV-1 AG W/HIV-1&-2 AB AG IA: CPT

## 2023-10-09 PROCEDURE — 84443 ASSAY THYROID STIM HORMONE: CPT

## 2023-10-09 PROCEDURE — 80053 COMPREHEN METABOLIC PANEL: CPT

## 2023-10-09 PROCEDURE — 85025 COMPLETE CBC W/AUTO DIFF WBC: CPT

## 2023-10-09 PROCEDURE — 36415 COLL VENOUS BLD VENIPUNCTURE: CPT

## 2023-10-09 PROCEDURE — 83036 HEMOGLOBIN GLYCOSYLATED A1C: CPT

## 2023-10-09 PROCEDURE — 83721 ASSAY OF BLOOD LIPOPROTEIN: CPT

## 2023-10-09 NOTE — RESULT ENCOUNTER NOTE
Unstable labs - will review with patient at upcoming appointment. Hyperlipidemia - LDL not at goal for DM2. Continue Zocor 40mg QHS. Low vitamin D - Recommend start multivitamin and over-the-counter vitamin D3 1000 - 3000 International Units daily.

## 2023-10-10 RX ORDER — BLOOD-GLUCOSE METER
EACH MISCELLANEOUS DAILY
Qty: 1 KIT | Refills: 0 | Status: SHIPPED | OUTPATIENT
Start: 2023-10-10

## 2023-10-10 NOTE — TELEPHONE ENCOUNTER
Previously ordered by previous PCP. Pt requesting a new glucometer. Please sign and send to pharmacy.

## 2023-10-11 ENCOUNTER — CONSULT (OUTPATIENT)
Dept: FAMILY MEDICINE CLINIC | Facility: CLINIC | Age: 63
End: 2023-10-11
Payer: COMMERCIAL

## 2023-10-11 VITALS
RESPIRATION RATE: 20 BRPM | HEIGHT: 69 IN | BODY MASS INDEX: 43.84 KG/M2 | HEART RATE: 74 BPM | SYSTOLIC BLOOD PRESSURE: 112 MMHG | DIASTOLIC BLOOD PRESSURE: 74 MMHG | TEMPERATURE: 97.9 F | WEIGHT: 296 LBS | OXYGEN SATURATION: 97 %

## 2023-10-11 DIAGNOSIS — N18.31 STAGE 3A CHRONIC KIDNEY DISEASE (HCC): ICD-10-CM

## 2023-10-11 DIAGNOSIS — S83.242A OTHER TEAR OF MEDIAL MENISCUS, CURRENT INJURY, LEFT KNEE, INITIAL ENCOUNTER: ICD-10-CM

## 2023-10-11 DIAGNOSIS — Z01.818 PRE-OP EVALUATION: Primary | ICD-10-CM

## 2023-10-11 DIAGNOSIS — E11.9 TYPE 2 DIABETES MELLITUS WITHOUT COMPLICATION, WITHOUT LONG-TERM CURRENT USE OF INSULIN (HCC): ICD-10-CM

## 2023-10-11 DIAGNOSIS — I10 ESSENTIAL HYPERTENSION: ICD-10-CM

## 2023-10-11 DIAGNOSIS — G47.33 OSA (OBSTRUCTIVE SLEEP APNEA): ICD-10-CM

## 2023-10-11 DIAGNOSIS — Z86.79 HISTORY OF ATRIAL FIBRILLATION: ICD-10-CM

## 2023-10-11 PROCEDURE — 99214 OFFICE O/P EST MOD 30 MIN: CPT | Performed by: FAMILY MEDICINE

## 2023-10-11 PROCEDURE — 93000 ELECTROCARDIOGRAM COMPLETE: CPT | Performed by: FAMILY MEDICINE

## 2023-10-11 NOTE — PATIENT INSTRUCTIONS
- Hold metformin the morning of surgery and do not resume until 48 hours AFTER surgery to avoid risk of lactic acidosis. Do not resume if eGFR is < 30    _ Hold Lisinopril 20mg daily Hold on the day of surgery. Resume when ok per surgery. Low vitamin D - Recommend start multivitamin and over-the-counter vitamin D3 1000 - 3000 International Units daily.

## 2023-10-13 NOTE — ASSESSMENT & PLAN NOTE
Lab Results   Component Value Date    EGFR 61 10/09/2023    EGFR 53 05/15/2023    EGFR 48 02/20/2023    CREATININE 1.23 10/09/2023    CREATININE 1.40 (H) 05/15/2023    CREATININE 1.51 (H) 02/20/2023     Stable.

## 2023-10-13 NOTE — ASSESSMENT & PLAN NOTE
Lab Results   Component Value Date    HGBA1C 6.3 (H) 10/09/2023     Stable. Continue Metformin. To consider GLP-1 agonist or SGLT-2 inhibitor in future? Patient declines DM education 9/12/23. Recommend lifestyle modifications. Goal blood sugars:  Fasting in AM - Less than 100. 2 hours after any meal - Less than 140.

## 2023-10-17 ENCOUNTER — ANESTHESIA (OUTPATIENT)
Dept: PERIOP | Facility: AMBULARY SURGERY CENTER | Age: 63
End: 2023-10-17
Payer: COMMERCIAL

## 2023-10-17 ENCOUNTER — HOSPITAL ENCOUNTER (OUTPATIENT)
Facility: AMBULARY SURGERY CENTER | Age: 63
Setting detail: OUTPATIENT SURGERY
Discharge: HOME | End: 2023-10-17
Attending: ORTHOPAEDIC SURGERY | Admitting: ORTHOPAEDIC SURGERY
Payer: COMMERCIAL

## 2023-10-17 VITALS
SYSTOLIC BLOOD PRESSURE: 127 MMHG | RESPIRATION RATE: 16 BRPM | TEMPERATURE: 97.4 F | HEART RATE: 77 BPM | WEIGHT: 295 LBS | HEIGHT: 69 IN | OXYGEN SATURATION: 98 % | BODY MASS INDEX: 43.69 KG/M2 | DIASTOLIC BLOOD PRESSURE: 70 MMHG

## 2023-10-17 DIAGNOSIS — S83.242A OTHER TEAR OF MEDIAL MENISCUS, CURRENT INJURY, LEFT KNEE, INITIAL ENCOUNTER: Primary | ICD-10-CM

## 2023-10-17 DIAGNOSIS — Z01.818 PRE-OP TESTING: ICD-10-CM

## 2023-10-17 PROCEDURE — 82948 REAGENT STRIP/BLOOD GLUCOSE: CPT

## 2023-10-17 RX ORDER — SODIUM CHLORIDE, SODIUM LACTATE, POTASSIUM CHLORIDE, CALCIUM CHLORIDE 600; 310; 30; 20 MG/100ML; MG/100ML; MG/100ML; MG/100ML
INJECTION, SOLUTION INTRAVENOUS CONTINUOUS PRN
Status: DISCONTINUED | OUTPATIENT
Start: 2023-10-17 | End: 2023-10-17

## 2023-10-17 RX ORDER — CHLORHEXIDINE GLUCONATE ORAL RINSE 1.2 MG/ML
15 SOLUTION DENTAL ONCE
Status: DISCONTINUED | OUTPATIENT
Start: 2023-10-17 | End: 2023-10-17 | Stop reason: HOSPADM

## 2023-10-17 RX ORDER — LIDOCAINE HYDROCHLORIDE AND EPINEPHRINE 10; 10 MG/ML; UG/ML
INJECTION, SOLUTION INFILTRATION; PERINEURAL AS NEEDED
Status: DISCONTINUED | OUTPATIENT
Start: 2023-10-17 | End: 2023-10-17 | Stop reason: HOSPADM

## 2023-10-17 RX ORDER — HYDROMORPHONE HCL/PF 1 MG/ML
0.5 SYRINGE (ML) INJECTION
Status: DISCONTINUED | OUTPATIENT
Start: 2023-10-17 | End: 2023-10-17 | Stop reason: HOSPADM

## 2023-10-17 RX ORDER — KETOROLAC TROMETHAMINE 30 MG/ML
INJECTION, SOLUTION INTRAMUSCULAR; INTRAVENOUS AS NEEDED
Status: DISCONTINUED | OUTPATIENT
Start: 2023-10-17 | End: 2023-10-17

## 2023-10-17 RX ORDER — FENTANYL CITRATE 50 UG/ML
INJECTION, SOLUTION INTRAMUSCULAR; INTRAVENOUS AS NEEDED
Status: DISCONTINUED | OUTPATIENT
Start: 2023-10-17 | End: 2023-10-17

## 2023-10-17 RX ORDER — MIDAZOLAM HYDROCHLORIDE 2 MG/2ML
INJECTION, SOLUTION INTRAMUSCULAR; INTRAVENOUS AS NEEDED
Status: DISCONTINUED | OUTPATIENT
Start: 2023-10-17 | End: 2023-10-17

## 2023-10-17 RX ORDER — CEFAZOLIN SODIUM 1 G/50ML
SOLUTION INTRAVENOUS AS NEEDED
Status: DISCONTINUED | OUTPATIENT
Start: 2023-10-17 | End: 2023-10-17

## 2023-10-17 RX ORDER — PROPOFOL 10 MG/ML
INJECTION, EMULSION INTRAVENOUS AS NEEDED
Status: DISCONTINUED | OUTPATIENT
Start: 2023-10-17 | End: 2023-10-17

## 2023-10-17 RX ORDER — DEXAMETHASONE SODIUM PHOSPHATE 10 MG/ML
INJECTION, SOLUTION INTRAMUSCULAR; INTRAVENOUS AS NEEDED
Status: DISCONTINUED | OUTPATIENT
Start: 2023-10-17 | End: 2023-10-17

## 2023-10-17 RX ORDER — ONDANSETRON 2 MG/ML
4 INJECTION INTRAMUSCULAR; INTRAVENOUS EVERY 6 HOURS PRN
Status: DISCONTINUED | OUTPATIENT
Start: 2023-10-17 | End: 2023-10-17 | Stop reason: HOSPADM

## 2023-10-17 RX ORDER — ONDANSETRON 2 MG/ML
INJECTION INTRAMUSCULAR; INTRAVENOUS AS NEEDED
Status: DISCONTINUED | OUTPATIENT
Start: 2023-10-17 | End: 2023-10-17

## 2023-10-17 RX ORDER — ONDANSETRON 2 MG/ML
4 INJECTION INTRAMUSCULAR; INTRAVENOUS ONCE AS NEEDED
Status: DISCONTINUED | OUTPATIENT
Start: 2023-10-17 | End: 2023-10-17 | Stop reason: HOSPADM

## 2023-10-17 RX ORDER — OXYCODONE HYDROCHLORIDE 5 MG/1
10 TABLET ORAL EVERY 4 HOURS PRN
Status: DISCONTINUED | OUTPATIENT
Start: 2023-10-17 | End: 2023-10-17 | Stop reason: HOSPADM

## 2023-10-17 RX ORDER — FENTANYL CITRATE/PF 50 MCG/ML
25 SYRINGE (ML) INJECTION
Status: DISCONTINUED | OUTPATIENT
Start: 2023-10-17 | End: 2023-10-17 | Stop reason: HOSPADM

## 2023-10-17 RX ORDER — LIDOCAINE HYDROCHLORIDE 10 MG/ML
INJECTION, SOLUTION EPIDURAL; INFILTRATION; INTRACAUDAL; PERINEURAL AS NEEDED
Status: DISCONTINUED | OUTPATIENT
Start: 2023-10-17 | End: 2023-10-17

## 2023-10-17 RX ORDER — EPHEDRINE SULFATE 50 MG/ML
INJECTION INTRAVENOUS AS NEEDED
Status: DISCONTINUED | OUTPATIENT
Start: 2023-10-17 | End: 2023-10-17

## 2023-10-17 RX ORDER — CEFAZOLIN SODIUM 2 G/50ML
SOLUTION INTRAVENOUS AS NEEDED
Status: DISCONTINUED | OUTPATIENT
Start: 2023-10-17 | End: 2023-10-17

## 2023-10-17 RX ORDER — OXYCODONE HYDROCHLORIDE 5 MG/1
5 TABLET ORAL EVERY 6 HOURS PRN
Qty: 7 TABLET | Refills: 0 | Status: SHIPPED | OUTPATIENT
Start: 2023-10-17 | End: 2023-10-22

## 2023-10-17 RX ORDER — OXYCODONE HYDROCHLORIDE 5 MG/1
5 TABLET ORAL EVERY 4 HOURS PRN
Status: DISCONTINUED | OUTPATIENT
Start: 2023-10-17 | End: 2023-10-17 | Stop reason: HOSPADM

## 2023-10-17 RX ADMIN — CEFAZOLIN SODIUM 1000 MG: 1 SOLUTION INTRAVENOUS at 12:17

## 2023-10-17 RX ADMIN — EPHEDRINE SULFATE 10 MG: 50 INJECTION INTRAVENOUS at 12:29

## 2023-10-17 RX ADMIN — LIDOCAINE HYDROCHLORIDE 50 MG: 10 INJECTION, SOLUTION EPIDURAL; INFILTRATION; INTRACAUDAL; PERINEURAL at 12:22

## 2023-10-17 RX ADMIN — MIDAZOLAM 2 MG: 1 INJECTION INTRAMUSCULAR; INTRAVENOUS at 12:17

## 2023-10-17 RX ADMIN — DEXAMETHASONE SODIUM PHOSPHATE 10 MG: 10 INJECTION, SOLUTION INTRAMUSCULAR; INTRAVENOUS at 12:25

## 2023-10-17 RX ADMIN — ONDANSETRON 4 MG: 2 INJECTION INTRAMUSCULAR; INTRAVENOUS at 12:25

## 2023-10-17 RX ADMIN — OXYCODONE HYDROCHLORIDE 5 MG: 5 TABLET ORAL at 14:09

## 2023-10-17 RX ADMIN — SODIUM CHLORIDE, SODIUM LACTATE, POTASSIUM CHLORIDE, AND CALCIUM CHLORIDE: .6; .31; .03; .02 INJECTION, SOLUTION INTRAVENOUS at 12:18

## 2023-10-17 RX ADMIN — KETOROLAC TROMETHAMINE 15 MG: 30 INJECTION, SOLUTION INTRAMUSCULAR; INTRAVENOUS at 13:08

## 2023-10-17 RX ADMIN — PHENYLEPHRINE HYDROCHLORIDE 100 MCG: 10 INJECTION INTRAVENOUS at 12:58

## 2023-10-17 RX ADMIN — PHENYLEPHRINE HYDROCHLORIDE 100 MCG: 10 INJECTION INTRAVENOUS at 12:52

## 2023-10-17 RX ADMIN — EPHEDRINE SULFATE 20 MG: 50 INJECTION INTRAVENOUS at 12:33

## 2023-10-17 RX ADMIN — FENTANYL CITRATE 25 MCG: 50 INJECTION INTRAMUSCULAR; INTRAVENOUS at 13:06

## 2023-10-17 RX ADMIN — FENTANYL CITRATE 50 MCG: 50 INJECTION INTRAMUSCULAR; INTRAVENOUS at 12:40

## 2023-10-17 RX ADMIN — PHENYLEPHRINE HYDROCHLORIDE 100 MCG: 10 INJECTION INTRAVENOUS at 12:49

## 2023-10-17 RX ADMIN — FENTANYL CITRATE 25 MCG: 50 INJECTION INTRAMUSCULAR; INTRAVENOUS at 13:00

## 2023-10-17 RX ADMIN — CEFAZOLIN SODIUM 2000 MG: 2 SOLUTION INTRAVENOUS at 12:17

## 2023-10-17 RX ADMIN — PROPOFOL 200 MG: 10 INJECTION, EMULSION INTRAVENOUS at 12:22

## 2023-10-17 NOTE — ANESTHESIA PREPROCEDURE EVALUATION
Procedure:  ARTHROSCOPY KNEE PARTIAL MEDIAL MENISECTOMY WITH ALL ASSOCIATED PROCEDURES (Left: Knee)    Relevant Problems   CARDIO   (+) MURRAY (dyspnea on exertion)   (+) Essential hypertension   (+) Mixed hyperlipidemia      ENDO   (+) Type 2 diabetes mellitus, without long-term current use of insulin (HCC)      GI/HEPATIC   (+) Gastroesophageal reflux disease      /RENAL   (+) Stage 3a chronic kidney disease (HCC)      MUSCULOSKELETAL   (+) Primary osteoarthritis of both knees      NEURO/PSYCH   (+) Current mild episode of major depressive disorder without prior episode (HCC)      PULMONARY   (+) MURRAY (dyspnea on exertion)   (+) JANEY (obstructive sleep apnea)      ECG on 10/11/23 reviewed   JANEY, does not use CPAP  Obesity, BMI 44    Lab Results   Component Value Date    WBC 5.39 10/09/2023    HGB 14.5 10/09/2023    HCT 47.5 10/09/2023    MCV 95 10/09/2023     10/09/2023     Lab Results   Component Value Date    SODIUM 141 10/09/2023    K 4.2 10/09/2023     10/09/2023    CO2 29 10/09/2023    BUN 18 10/09/2023    CREATININE 1.23 10/09/2023    CALCIUM 9.6 10/09/2023     No results found for: "INR", "PROTIME"  Lab Results   Component Value Date    HGBA1C 6.3 (H) 10/09/2023          Physical Exam    Airway    Mallampati score: II  TM Distance: >3 FB  Neck ROM: full     Dental   Comment: Several missing teeth; none loose per patient. Cardiovascular      Pulmonary      Other Findings        Anesthesia Plan  ASA Score- 3     Anesthesia Type- general with ASA Monitors. Additional Monitors:     Airway Plan: LMA. Plan Factors-    Chart reviewed. EKG reviewed. Existing labs reviewed. Patient summary reviewed. Patient is not a current smoker. Obstructive sleep apnea risk education given perioperatively. Induction- intravenous. Postoperative Plan-     Informed Consent- Anesthetic plan and risks discussed with patient. I personally reviewed this patient with the CRNA.  Discussed and agreed on the Anesthesia Plan with the CRNA. Marsha Pearce

## 2023-10-17 NOTE — INTERVAL H&P NOTE
H&P reviewed. After examining the patient I find no changes in the patients condition since the H&P had been written.     Vitals:    10/17/23 1120   BP: 108/71   Pulse: 73   Resp: 16   Temp: (!) 97 °F (36.1 °C)   SpO2: 93%

## 2023-10-17 NOTE — ANESTHESIA POSTPROCEDURE EVALUATION
Post-Op Assessment Note    CV Status:  Stable  Pain Score: 0    Pain management: adequate     Mental Status:  Arousable and sleepy   Hydration Status:  Stable and euvolemic   PONV Controlled:  Controlled   Airway Patency:  Patent and adequate      Post Op Vitals Reviewed: Yes      Staff: CRNA         No notable events documented.     /78 (10/17/23 1319)    Temp 98.3 °F (36.8 °C) (10/17/23 1319)    Pulse 96 (10/17/23 1319)   Resp 16 (10/17/23 1319)    SpO2 96 % (10/17/23 1319)

## 2023-10-17 NOTE — DISCHARGE INSTR - AVS FIRST PAGE
Keep dressings clean and dry for 2- 3 days. May remove dressings in three days. May shower if incisions dry. Please clean incisions with alcohol after showers. Weight bearing as tolerated. Apply band-aide if needed. Utilize naproxen or Aleve as her primary source of pain relief use the pain medicines prescribed if needed and sparingly. Perform knee extension or straining out and flexion or bending the knee multiple times throughout the day as for you can tolerate. Up to 30 repetitions at 1 time.

## 2023-10-17 NOTE — OP NOTE
OPERATIVE REPORT  PATIENT NAME: Danita Hernandez    :  1960  MRN: [id number]  Pt Location: [facility] OR ROOM 06    SURGERY DATE: 10/17/2023    Surgeon(s) and Role:     * Tyler Briceno DO - Primary  [facility] utilized during the case for assistance with positioning draping retraction closure and dressing application    Preop Diagnosis:  Other tear of medial meniscus, current injury, left knee, initial encounter [X25.832Z]  Pre-op testing [Z01.818]    Post-Op Diagnosis Codes:     * Other tear of medial meniscus, current injury, left knee, initial encounter [N07.218X]     * Pre-op testing [Z01.818]    Procedure(s):  Left - ARTHROSCOPY KNEE PARTIAL MEDIAL MENISECTOMY WITH ALL ASSOCIATED PROCEDURES    Specimen(s):  * No specimens in log *    Estimated Blood Loss:   Minimal    Drains:  * No LDAs found *    Anesthesia Type:   Choice    Operative Indications: Other tear of medial meniscus, current injury, left knee, initial encounter [S83.242A]  Pre-op testing [Z01.818]      Operative Findings:  Severe medial meniscus tear complex extending to the peripheral aspect with delamination and fraying there is also grade 2 and grade 3 changes in the medial femoral condyle. There is also grade 2 and 3 changes in the superior patella facet    Complications:   None    Procedure and Technique:  Patient was seen and examined in the preoperative area. The left lower extremity was marked, the consent an H&P had been reviewed. The patient was brought back to the operative suite. The patient was intubated sedated. The left lower extremity was prepped and draped in a sterile fashion. After proper timeout commenced and identified the left lower extremity as the operative site. Injection of quarter percent Marcaine with epinephrine was injected in both medial lateral portal site. Incision over the lateral portal was made with eleven blade. We performed a diagnostic arthroscopy.   We used the arthroscope and spinal needle to located our medial portal, and made incision with 11 blade and dilated with hemostat. We completed our diagnostic arthroscopy. We found Severe medial meniscus tear complex extending to the peripheral aspect with delamination and fraying there is also grade 2 and grade 3 changes in the medial femoral condyle. There is also grade 2 and 3 changes in the superior patella facet. We debrided our meniscus back to a stable by using electro cautery device, shaver, and biters. Performed a chondroplasty to the superior patella facet and the medial femoral condyle. We copiously irrigated the wound. We closed the incision with 3-0 nylon. Xeroform was applied to the incisions. 4 x 4's ABDs , web roll and an Ace wrap were applied to left lower extremity. The patient was taken back to PACU after anesthesia was reversed. I was present for all critical portions of the procedure. and A qualified resident physician was not available.     Patient Disposition:  PACU         SIGNATURE: Darryn Parker DO  DATE: October 17, 2023  TIME: 12:03 PM

## 2023-10-18 LAB — GLUCOSE SERPL-MCNC: 113 MG/DL (ref 65–140)

## 2023-10-20 ENCOUNTER — VBI (OUTPATIENT)
Dept: ADMINISTRATIVE | Facility: OTHER | Age: 63
End: 2023-10-20

## 2023-10-23 ENCOUNTER — RA CDI HCC (OUTPATIENT)
Dept: OTHER | Facility: HOSPITAL | Age: 63
End: 2023-10-23

## 2023-10-23 NOTE — PROGRESS NOTES
[address] [facility] coding opportunities          Chart Reviewed number of suggestions sent to Provider: 2   E11.22 and F11.20  Patients Insurance     Medicare Insurance: [facility] Complete

## 2023-10-26 NOTE — PROGRESS NOTES
Assessment/Plan:  Problem List Items Addressed This Visit        Digestive    Gastroesophageal reflux disease     Stable. Continue Pepcid 20mg BID PRN. Watch GERD diet. To consider GI referral for EGD if uncontrolled? Endocrine    Type 2 diabetes mellitus, without long-term current use of insulin ([address]) - Primary       Lab Results   Component Value Date    HGBA1C 6.3 (H) 10/09/2023     Stable. Continue Metformin. Patient will call his insurance Re:  GLP-1 agonist or SGLT-2 inhibitor in future? Patient declines DM education 9/12/23. Recommend lifestyle modifications. Goal blood sugars:  Fasting in AM - Less than 100. 2 hours after any meal - Less than 140. Relevant Medications    metFORMIN (GLUCOPHAGE) 850 mg tablet    Other Relevant Orders    Comprehensive metabolic panel    Hemoglobin A1C       Respiratory    JANEY (obstructive sleep apnea)     Patient intolerant of CPAP. He is interested in [facility]. Cardiovascular and Mediastinum    Essential hypertension     Stable. Check blood pressure outside of office. Recommend lifestyle modifications. Relevant Medications    lisinopril (ZESTRIL) 20 mg tablet    metoprolol tartrate (LOPRESSOR) 50 mg tablet    Other Relevant Orders    Comprehensive metabolic panel       Musculoskeletal and Integument    Primary osteoarthritis of both knees     Management per Ortho. You may use Tylenol (Acetaminophen) up to 3,000mg daily (in 24 hours) as needed for pain or fever. Genitourinary    BPH without obstruction/lower urinary tract symptoms     Pending Uro consult. Unstable. Continue Flomax 04mg QHS. Relevant Medications    tamsulosin (FLOMAX) 0.4 mg       Other    Current mild episode of major depressive disorder without prior episode ([address])     Stable s meds and counseling.            Relevant Orders    TSH, 3rd generation with Free T4 reflex    Class 3 severe obesity due to excess calories with serious comorbidity and body mass index (BMI) of 45.0 to 49.9 in adult (HCC)     Stable. Recommend lifestyle modifications. Patient previously on Ozempic, but D/C due to cost.  To consider Weight Management referral PRN? Relevant Orders    TSH, 3rd generation with Free T4 reflex    Mixed hyperlipidemia     Stable. Continue Zocor 40mg QHS. Recommend lifestyle modifications. Relevant Medications    simvastatin (ZOCOR) 40 mg tablet    Other Relevant Orders    Comprehensive metabolic panel    TSH, 3rd generation with Free T4 reflex    LDL cholesterol, direct    RLS (restless legs syndrome)     Stable on Requip 1 mg nightly. Relevant Medications    rOPINIRole (REQUIP) 1 mg tablet    Seasonal allergies     Stable on Claritin 10 mg daily. History of atrial fibrillation     Stable s/p ablation. Pending Cardio consult. Return in about 4 months (around 2/27/2024) for AWV / 4mo - DM2, HTN, HL, Dep, CKD, GERD, Afib, RLS, Labs. Future Appointments   Date Time Provider [address] [place] [address]   11/10/2023 11:15 AM Juma Rivera DO ORTHO AND Practice[facility]   11/27/2023  9:20 AM Ruben Ruiz MD [facility]   2/20/2024  9:00 AM Lyudmila Sequeira [place] [facility]   2/28/2024 11:40 AM Enedelia Azevedo DO [facility]        Subjective:     Adrienne Xavier is a 58 y.o. male who presents today for a follow-up on his chronic medical conditions. HPI:  Chief Complaint   Patient presents with   • Follow-up     6 week F/U DM2, HTN, HL, Dep, CKD, GERD, Afib, RLS, Labs. No new problems or concerns at this time. • HM     No additional covid boosters. Declines all flu vaccines. DM eye exam not yet schedule. -- Above per clinical staff and reviewed. --      HPI      Today:    Return in about 6 weeks (around 10/24/2023) for F/U DM2, HTN, HL, Dep, CKD, GERD, Afib, RLS, Labs. PTO c wife Priyanka Wilcox       Desires PSA and PINA? Last PSA 5/15/23. On ASA 81mg QD due to H/O Afib. Morbid Obesity - Not watching diet. No regular exercise due to knee pain. Does yard work. Previously on Ozempic, but stopped after a couple months in  as Rx was too expensive previously. DM2 - Dx 23 POCT HgA1C 6.6. On Metformin 850mg 1 pill BID (no higher dose previously). No Optho. No Podiatry. Declines DM Education 23. Checking BS 3 times per week. FBS:  106-144, average 135's. 2 hr PP:  106-144, average 135's. No low BS. HTN - On Metoprolol tartrate 50mg BID, Lisinopril 20mg QD. No BP check outside of office. Has wrist cuff at home. Hyperlipidemia -  On Zocor 40mg QHS. No higher dose or other statins previously. CKD Stage 3 - No Renal consult previously. Afib /Fam Hx Early CAD - Management per Cardio - pending consult c Dr. Karyle Smoke . On ASA 81mg QD long term, Metoprolol 50mg BID. Previously on Eliquis, which was D/C s/p ablation, failed cardioversion previously. JANEY - Management per Sleep Medicine? He is CPAP intolerant despite trying different masks. GERD - On Pepcid 20mg BID PRN - using QD, and D/C Omeprazole 20mg QD (after taking for years) x 6 weeks. No GI consult or EGD previously. No other GERD meds. Watching GERD diet. Depression - Stable s meds. Previously on unknown med D/C after 1-2 days due to weird nightmares. No counseling previously. Poor social supports. No SI/HI/AH/VH.           PHQ-2/9 Depression Screening    Little interest or pleasure in doing things: 1 - several days  Feeling down, depressed, or hopeless: 1 - several days  Trouble falling or staying asleep, or sleeping too much: 3 - nearly every day  Feeling tired or having little energy: 2 - more than half the days  Poor appetite or overeatin - several days  Feeling bad about yourself - or that you are a failure or have let yourself or your family down: 0 - not at all  Trouble concentrating on things, such as reading the newspaper or watching television: 1 - several days  Moving or speaking so slowly that other people could have noticed. Or the opposite - being so fidgety or restless that you have been moving around a lot more than usual: 0 - not at all  Thoughts that you would be better off dead, or of hurting yourself in some way: 0 - not at all  PHQ-9 Score: 9   PHQ-9 Interpretation: Mild depression        JAKE-7 Flowsheet Screening    Flowsheet Row Most Recent Value   Over the last 2 weeks, how often have you been bothered by any of the following problems? Feeling nervous, anxious, or on edge 0   Not being able to stop or control worrying 0   Worrying too much about different things 1   Trouble relaxing 1   Being so restless that it is hard to sit still 0   Becoming easily annoyed or irritable 0   Feeling afraid as if something awful might happen 0   JAKE-7 Total Score 2         RLS - Stable on Requp 1mg QHS. No higher dose or other meds previously. OA B/L Knees - Management per Ortho Dr. Robison Led.  Next appt 10/23. Declined PT. AR - On Claritin 10mg QD. Nocturia - On Flomax 0.4mg QHS. Does not see Uro. Nocturia x 2. Has urinary hesitancy. No prostate exam previously. From previous note:  Controlled Substance Review     [place] PDMP or [place]  reviewed: No red flags were identified; safe to proceed with prescription. Desires PSA and PINA? Last PSA 5/15/23. On ASA 81mg QD due to H/O Afib. Morbid Obesity - Not watching diet. No regular exercise due to knee pain. Does yard work. Previously on Ozempic, but stopped after a couple months in 2021 as Rx was too expensive previously. DM2 - Dx 5/16/23 POCT HgA1C 6.6. On Metformin 850mg 1 pill BID (no higher dose previously). No Optho. No Podiatry. Declines DM Education 9/12/23. HTN - On Metoprolol tartrate 50mg BID, Lisinopril 20mg QD. No BP check outside of office. Has wrist cuff at home. Hyperlipidemia -  On Zocor 40mg QHS.   No higher dose or other statins previously. CKD Stage 3 - No Renal consult previously. Afib /Fam Hx Early CAD - Management per Cardio - [facility].  On ASA 81mg QD long term, Metoprolol 50mg BID. Previously on Eliquis, which was D/C s/p ablation, failed cardioversion previously. JANEY - Management per Sleep Medicine? He is CPAP intolerant despite trying different masks. GERD - On Omeprazole 20mg QD for years. No GI consult or EGD previously. No other GERD meds. Watching GERD diet. Depression - Stable s meds. Previously on unknown med D/C after 1-2 days due to weird nightmares. No counseling previously. Poor social supports. No SI/HI/AH/VH. PHQ-2/9 Depression Screening       Little interest or pleasure in doing things: 1 - several days  Feeling down, depressed, or hopeless: 1 - several days  Trouble falling or staying asleep, or sleeping too much: 1 - several days  Feeling tired or having little energy: 1 - several days  Poor appetite or overeatin - several days  Feeling bad about yourself - or that you are a failure or have let yourself or your family down: 0 - not at all  Trouble concentrating on things, such as reading the newspaper or watching television: 0 - not at all  Moving or speaking so slowly that other people could have noticed. Or the opposite - being so fidgety or restless that you have been moving around a lot more than usual: 0 - not at all  Thoughts that you would be better off dead, or of hurting yourself in some way: 0 - not at all  PHQ-9 Score: 5   PHQ-9 Interpretation: Mild depression                    JAKE-7 Flowsheet Screening    Flowsheet Row Most Recent Value   Over the last 2 weeks, how often have you been bothered by any of the following problems?      Feeling nervous, anxious, or on edge 0   Not being able to stop or control worrying 0   Worrying too much about different things 0   Trouble relaxing 0   Being so restless that it is hard to sit still 0   Becoming easily annoyed or irritable 0   Feeling afraid as if something awful might happen 0   JAKE-7 Total Score 0          MDQ:  0, Asynchronous, No Problem     RLS - Stable on Requp 1mg QHS. No higher dose or other meds previously. OA B/L Knees - Management per Ortho Dr. Oneal Goodpasture. Next appt PRN. Declined PT. AR - On Claritin 10mg QD. Nocturia - On Flomax 0.4mg QHS. Does not see Uro. Nocturia x 2. Has urinary hesitancy. No prostate exam previously. Reviewed:  Labs 10/9/23, Ortho 10/5/23     No Uro. The following portions of the patient's history were reviewed and updated as appropriate: allergies, current medications, past family history, past medical history, past social history, past surgical history and problem list.      Review of Systems   Constitutional:  Positive for fatigue. Negative for appetite change, chills, diaphoresis and fever. Respiratory:  Negative for chest tightness and shortness of breath. Cardiovascular:  Negative for chest pain and palpitations. Gastrointestinal:  Negative for abdominal pain, blood in stool, diarrhea, nausea and vomiting. Genitourinary:  Negative for dysuria.         Current Outpatient Medications   Medication Sig Dispense Refill   • Accu-Chek Guide test strip      • Accu-Chek Softclix Lancets lancets      • Blood Glucose Monitoring Suppl (Accu-Chek Guide Me) w/Device KIT TEST ONCE DAILY AS DIRECTED 1 kit 0   • famotidine (PEPCID) 20 mg tablet TAKE 1 TABLET BY MOUTH TWICE A  tablet 1   • lisinopril (ZESTRIL) 20 mg tablet Take 1 tablet (20 mg total) by mouth daily 90 tablet 2   • loratadine (CLARITIN) 10 mg tablet TAKE 1 TABLET BY MOUTH EVERY DAY (Patient taking differently: Take 10 mg by mouth daily as needed for allergies) 90 tablet 1   • metFORMIN (GLUCOPHAGE) 850 mg tablet Take 1 tablet (850 mg total) by mouth daily with breakfast 180 tablet 2   • metoprolol tartrate (LOPRESSOR) 50 mg tablet Take 1 tablet (50 mg total) by mouth 2 (two) times a day 180 tablet 2   • Multiple Vitamin (multivitamin) capsule Take 1 capsule by mouth daily     • rOPINIRole (REQUIP) 1 mg tablet Take 1 tablet (1 mg total) by mouth daily at bedtime 90 tablet 2   • simvastatin (ZOCOR) 40 mg tablet Take 1 tablet (40 mg total) by mouth daily at bedtime 90 tablet 2   • tamsulosin (FLOMAX) 0.4 mg Take 1 capsule (0.4 mg total) by mouth daily with dinner 90 capsule 2   • cholecalciferol (VITAMIN D3) 25 mcg (1,000 units) tablet Take 1,000 Units by mouth daily (Patient not taking: Reported on 10/27/2023)       No current facility-administered medications for this visit. Objective:  /58   Pulse 69   Temp 97.8 °F (36.6 °C)   Resp 18   Ht 5' 9" (1.753 m)   Wt 134 kg (294 lb 12.8 oz)   SpO2 100%   BMI 43.53 kg/m²    Wt Readings from Last 3 Encounters:   10/27/23 134 kg (294 lb 12.8 oz)   10/06/23 134 kg (295 lb)   10/11/23 134 kg (296 lb)      BP Readings from Last 3 Encounters:   10/27/23 106/58   10/17/23 127/70   10/11/23 112/74          Physical Exam  Vitals and nursing note reviewed. Constitutional:       Appearance: Normal appearance. He is well-developed. He is obese. HENT:      Head: Normocephalic and atraumatic. Eyes:      Conjunctiva/sclera: Conjunctivae normal.   Neck:      Thyroid: No thyromegaly. Cardiovascular:      Rate and Rhythm: Normal rate and regular rhythm. Pulses: Normal pulses. Heart sounds: Normal heart sounds. Pulmonary:      Effort: Pulmonary effort is normal.      Breath sounds: Normal breath sounds. Abdominal:      General: Bowel sounds are normal. There is no distension. Palpations: Abdomen is soft. There is no mass. Tenderness: There is no abdominal tenderness. There is no guarding or rebound. Musculoskeletal:         General: No swelling or tenderness. Cervical back: Neck supple. Right lower leg: No edema. Left lower leg: No edema.       Comments: Walking with straight cane   Lymphadenopathy:      Cervical: No cervical adenopathy. Neurological:      General: No focal deficit present. Mental Status: He is alert and oriented to person, place, and time. Mental status is at baseline. Psychiatric:         Mood and Affect: Mood normal.         Behavior: Behavior normal.         Thought Content: Thought content normal.         Judgment: Judgment normal.         Lab Results:      Lab Results   Component Value Date    WBC 5.39 10/09/2023    HGB 14.5 10/09/2023    HCT 47.5 10/09/2023     10/09/2023    TRIG 165 (H) 05/15/2023    HDL 37 (L) 05/15/2023    LDLDIRECT 108 (H) 10/09/2023    ALT 21 10/09/2023    AST 20 10/09/2023    K 4.2 10/09/2023     10/09/2023    CREATININE 1.23 10/09/2023    BUN 18 10/09/2023    CO2 29 10/09/2023    PSA 0.7 05/15/2023    GLUF 105 (H) 10/09/2023    HGBA1C 6.3 (H) 10/09/2023     No results found for: "URICACID"  Invalid input(s): "BASENAME" Vitamin D    MRI knee left  wo contrast    Result Date: 10/2/2023  Narrative: MRI LEFT KNEE INDICATION:   O47.397F: Other tear of medial meniscus, current injury, left knee, initial encounter. COMPARISON: Prior MRI study dated 5/12/2021. TECHNIQUE:    Multiplanar/multisequence MR of the left knee was performed. Imaging performed on 1.5T MRI FINDINGS: SUBCUTANEOUS TISSUES: There is minimal anterior subcutaneous edema. JOINT EFFUSION: There is a small joint effusion. BAKER'S CYST: Small to moderate sized Baker's cyst. MENISCI: There is now a complex tear of the body and posterior horn of the medial meniscus with horizontal, longitudinal, and radial components. There is mild extrusion of the medial meniscus. The lateral meniscus is intact. CRUCIATE LIGAMENTS: Intact. EXTENSOR APPARATUS: Intact. There is distal quadriceps tendon enthesopathy. COLLATERAL LIGAMENTS: Intact. ARTICULAR SURFACES: There is mild chondral thinning along the weightbearing portion of the medial femoral tibial compartment. There is mild chondral thinning along the patella. BONES: Normal. MUSCULATURE:  Intact. Impression: Complex tear body and posterior horn of the medial meniscus with mild medial extrusion. Mild chondral thinning weightbearing portion of the medial femoral tibial compartment.  Small joint effusion and small to moderate sized Baker's cyst. Workstation performed: [id number]        POCT Labs

## 2023-10-27 ENCOUNTER — OFFICE VISIT (OUTPATIENT)
Dept: OBGYN CLINIC | Facility: CLINIC | Age: 63
End: 2023-10-27

## 2023-10-27 ENCOUNTER — OFFICE VISIT (OUTPATIENT)
Dept: FAMILY MEDICINE CLINIC | Facility: CLINIC | Age: 63
End: 2023-10-27
Payer: COMMERCIAL

## 2023-10-27 VITALS
BODY MASS INDEX: 43.66 KG/M2 | TEMPERATURE: 97.8 F | HEART RATE: 69 BPM | RESPIRATION RATE: 18 BRPM | SYSTOLIC BLOOD PRESSURE: 106 MMHG | DIASTOLIC BLOOD PRESSURE: 58 MMHG | WEIGHT: 294.8 LBS | OXYGEN SATURATION: 100 % | HEIGHT: 69 IN

## 2023-10-27 DIAGNOSIS — E78.2 MIXED HYPERLIPIDEMIA: ICD-10-CM

## 2023-10-27 DIAGNOSIS — J30.2 SEASONAL ALLERGIES: ICD-10-CM

## 2023-10-27 DIAGNOSIS — Z87.828 STATUS POST ARTHROSCOPIC PARTIAL MEDIAL MENISCECTOMY OF LEFT KNEE: Primary | ICD-10-CM

## 2023-10-27 DIAGNOSIS — K21.9 GASTROESOPHAGEAL REFLUX DISEASE, UNSPECIFIED WHETHER ESOPHAGITIS PRESENT: ICD-10-CM

## 2023-10-27 DIAGNOSIS — Z86.79 HISTORY OF ATRIAL FIBRILLATION: ICD-10-CM

## 2023-10-27 DIAGNOSIS — N40.0 BPH WITHOUT OBSTRUCTION/LOWER URINARY TRACT SYMPTOMS: ICD-10-CM

## 2023-10-27 DIAGNOSIS — I10 ESSENTIAL HYPERTENSION: ICD-10-CM

## 2023-10-27 DIAGNOSIS — G25.81 RLS (RESTLESS LEGS SYNDROME): ICD-10-CM

## 2023-10-27 DIAGNOSIS — Z98.890 STATUS POST ARTHROSCOPIC PARTIAL MEDIAL MENISCECTOMY OF LEFT KNEE: Primary | ICD-10-CM

## 2023-10-27 DIAGNOSIS — G47.33 OSA (OBSTRUCTIVE SLEEP APNEA): ICD-10-CM

## 2023-10-27 DIAGNOSIS — E11.9 TYPE 2 DIABETES MELLITUS WITHOUT COMPLICATION, WITHOUT LONG-TERM CURRENT USE OF INSULIN (HCC): Primary | ICD-10-CM

## 2023-10-27 DIAGNOSIS — M17.0 PRIMARY OSTEOARTHRITIS OF BOTH KNEES: ICD-10-CM

## 2023-10-27 DIAGNOSIS — F32.0 CURRENT MILD EPISODE OF MAJOR DEPRESSIVE DISORDER WITHOUT PRIOR EPISODE (HCC): ICD-10-CM

## 2023-10-27 DIAGNOSIS — E66.01 CLASS 3 SEVERE OBESITY DUE TO EXCESS CALORIES WITH SERIOUS COMORBIDITY AND BODY MASS INDEX (BMI) OF 45.0 TO 49.9 IN ADULT (HCC): ICD-10-CM

## 2023-10-27 PROBLEM — N18.31 STAGE 3A CHRONIC KIDNEY DISEASE (HCC): Status: RESOLVED | Noted: 2022-02-16 | Resolved: 2023-10-27

## 2023-10-27 PROCEDURE — 99024 POSTOP FOLLOW-UP VISIT: CPT | Performed by: ORTHOPAEDIC SURGERY

## 2023-10-27 PROCEDURE — 99214 OFFICE O/P EST MOD 30 MIN: CPT | Performed by: FAMILY MEDICINE

## 2023-10-27 RX ORDER — MULTIVITAMIN
1 CAPSULE ORAL DAILY
COMMUNITY

## 2023-10-27 RX ORDER — ROPINIROLE 1 MG/1
1 TABLET, FILM COATED ORAL
Qty: 90 TABLET | Refills: 2 | Status: SHIPPED | OUTPATIENT
Start: 2023-10-27

## 2023-10-27 RX ORDER — VITAMIN B COMPLEX
1000 TABLET ORAL DAILY
COMMUNITY

## 2023-10-27 RX ORDER — TAMSULOSIN HYDROCHLORIDE 0.4 MG/1
0.4 CAPSULE ORAL
Qty: 90 CAPSULE | Refills: 2 | Status: SHIPPED | OUTPATIENT
Start: 2023-10-27

## 2023-10-27 RX ORDER — METOPROLOL TARTRATE 50 MG/1
50 TABLET, FILM COATED ORAL 2 TIMES DAILY
Qty: 180 TABLET | Refills: 2 | Status: SHIPPED | OUTPATIENT
Start: 2023-10-27

## 2023-10-27 RX ORDER — SIMVASTATIN 40 MG
40 TABLET ORAL
Qty: 90 TABLET | Refills: 2 | Status: SHIPPED | OUTPATIENT
Start: 2023-10-27

## 2023-10-27 RX ORDER — LISINOPRIL 20 MG/1
20 TABLET ORAL DAILY
Qty: 90 TABLET | Refills: 2 | Status: SHIPPED | OUTPATIENT
Start: 2023-10-27

## 2023-10-27 NOTE — ASSESSMENT & PLAN NOTE
Management per Ortho. You may use Tylenol (Acetaminophen) up to 3,000mg daily (in 24 hours) as needed for pain or fever.

## 2023-10-27 NOTE — PROGRESS NOTES
Assessment:   Status Post  Arthroscopy Knee Partial Medial Menisectomy Chondroplasty - Left on 10/17/2023 left knee osteoarthritis    Plan:   Weight bearing: As tolerated  Pain control. Instructed in straight leg raises and quad sets continue with range of motion exercises  Discussed that we will need to consider treatment of his osteoarthritis in the future as need arises    Follow Up:  2  week(s)    To Do Next Visit:  Begin eccentric strengthening      CHIEF COMPLAINT:  Chief Complaint   Patient presents with   • Left Knee - Post-op         SUBJECTIVE:  Anil Post is a 58y.o. year old male who presents for follow up after Arthroscopy Knee Partial Medial Menisectomy Chondroplasty - Left. Today patient has improvements in pain and symptoms still some soreness in the medial aspect the knee. He is ambulating with use of a cane.       PHYSICAL EXAMINATION:    MUSCULOSKELETAL EXAMINATION: Left knee  General: Alert and oriented x 3, WNWD, NAD  Incision: Clean, dry, intact, healing well  No jayson-incisional erythema  no drainage or purulence  No fluctuance or swelling   No effusion  Range of Motion: As expected  Neurovascular status: Sensation intact to light touch L1-S1   Motor intact L1-S1  Pulses: Intact  Done today: Sutures out and Steri strips applied      STUDIES REVIEWED:  Intraoperative photos were reviewed with the patient      PROCEDURES PERFORMED:  Procedures  No Procedures performed today

## 2023-10-27 NOTE — PATIENT INSTRUCTIONS
Low vitamin D - Recommend start multivitamin and over-the-counter vitamin D3 1000 - 3000 International Units daily. Call Insurance to Ask About Diabetes Med Coverage -      GLP-1 Agonists - Pill - Rybelsus, etc., Injectable - Ozempic, Victoza, Byetta, etc.  SGLT-2 Inhibitor - Pill - Jardiance, etc.        You can also ask about coverage and pricing for continuous glucose monitor.

## 2023-10-27 NOTE — ASSESSMENT & PLAN NOTE
Stable. Continue Pepcid 20mg BID PRN. Watch GERD diet. To consider GI referral for EGD if uncontrolled?

## 2023-10-27 NOTE — ASSESSMENT & PLAN NOTE
Lab Results   Component Value Date    HGBA1C 6.3 (H) 10/09/2023     Stable. Continue Metformin. Patient will call his insurance Re:  GLP-1 agonist or SGLT-2 inhibitor in future? Patient declines DM education 9/12/23. Recommend lifestyle modifications. Goal blood sugars:  Fasting in AM - Less than 100. 2 hours after any meal - Less than 140.

## 2023-10-27 NOTE — ASSESSMENT & PLAN NOTE
Stable. Recommend lifestyle modifications. Patient previously on Ozempic, but D/C due to cost.  To consider Weight Management referral PRN?

## 2023-11-10 ENCOUNTER — OFFICE VISIT (OUTPATIENT)
Dept: OBGYN CLINIC | Facility: CLINIC | Age: 63
End: 2023-11-10

## 2023-11-10 VITALS
HEIGHT: 69 IN | WEIGHT: 294 LBS | BODY MASS INDEX: 43.55 KG/M2 | DIASTOLIC BLOOD PRESSURE: 72 MMHG | HEART RATE: 62 BPM | SYSTOLIC BLOOD PRESSURE: 111 MMHG

## 2023-11-10 DIAGNOSIS — Z98.890 STATUS POST ARTHROSCOPIC PARTIAL MEDIAL MENISCECTOMY OF LEFT KNEE: Primary | ICD-10-CM

## 2023-11-10 DIAGNOSIS — Z87.828 STATUS POST ARTHROSCOPIC PARTIAL MEDIAL MENISCECTOMY OF LEFT KNEE: Primary | ICD-10-CM

## 2023-11-10 PROCEDURE — 99024 POSTOP FOLLOW-UP VISIT: CPT | Performed by: ORTHOPAEDIC SURGERY

## 2023-11-10 NOTE — PROGRESS NOTES
Assessment:   Status Post  Arthroscopy Knee Partial Medial Menisectomy Chondroplasty - Left on 10/17/2023 left knee osteoarthritis    Plan:   Weight bearing: As tolerated  Pain control. Instructed in eccentric strengthening  And may follow-up on as-needed basis we will treat him if needed in the future for any arthritic flare or pain    Follow Up:  PRN    To Do Next Visit:  Begin eccentric strengthening      CHIEF COMPLAINT:  Chief Complaint   Patient presents with   • Follow-up     Patient is doing well not having nay pain at this time arthroscopy left knee 10/17/23         SUBJECTIVE:  Damian Yi is a 58y.o. year old male who presents for follow up after Arthroscopy Knee Partial Medial Menisectomy Chondroplasty - Left. Today patient has no complaints regards to left knee. He reports he is doing the range of motion exercises not so much the straight leg raises and quad sets. Reports that he is doing well from pain standpoint is eager to begin walking again.       PHYSICAL EXAMINATION:    MUSCULOSKELETAL EXAMINATION: Left knee  General: Alert and oriented x 3, WNWD, NAD  Incision: Well-healed  No fluctuance or swelling   No effusion  Range of Motion: As expected  Neurovascular status: Sensation intact to light touch L1-S1   Motor intact L1-S1  Pulses: Intact        STUDIES REVIEWED:  None      PROCEDURES PERFORMED:  Procedures  No Procedures performed today

## 2023-11-24 DIAGNOSIS — E11.9 TYPE 2 DIABETES MELLITUS WITHOUT COMPLICATION, WITHOUT LONG-TERM CURRENT USE OF INSULIN (HCC): ICD-10-CM

## 2023-11-27 ENCOUNTER — CONSULT (OUTPATIENT)
Dept: CARDIOLOGY CLINIC | Facility: CLINIC | Age: 63
End: 2023-11-27
Payer: COMMERCIAL

## 2023-11-27 VITALS
SYSTOLIC BLOOD PRESSURE: 124 MMHG | BODY MASS INDEX: 43.45 KG/M2 | WEIGHT: 293.4 LBS | HEART RATE: 76 BPM | HEIGHT: 69 IN | OXYGEN SATURATION: 95 % | DIASTOLIC BLOOD PRESSURE: 82 MMHG

## 2023-11-27 DIAGNOSIS — I48.0 PAROXYSMAL ATRIAL FIBRILLATION (HCC): Primary | ICD-10-CM

## 2023-11-27 DIAGNOSIS — Z86.79 HISTORY OF ATRIAL FIBRILLATION: ICD-10-CM

## 2023-11-27 DIAGNOSIS — I10 ESSENTIAL HYPERTENSION: ICD-10-CM

## 2023-11-27 DIAGNOSIS — Z82.49 FAMILY HISTORY OF EARLY CAD: ICD-10-CM

## 2023-11-27 PROCEDURE — 93000 ELECTROCARDIOGRAM COMPLETE: CPT | Performed by: INTERNAL MEDICINE

## 2023-11-27 PROCEDURE — 99204 OFFICE O/P NEW MOD 45 MIN: CPT | Performed by: INTERNAL MEDICINE

## 2023-11-27 RX ORDER — BLOOD-GLUCOSE METER
EACH MISCELLANEOUS DAILY
Qty: 100 KIT | Refills: 0 | Status: SHIPPED | OUTPATIENT
Start: 2023-11-27
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
## 2023-12-06 ENCOUNTER — TELEPHONE (OUTPATIENT)
Age: 63
End: 2023-12-06

## 2023-12-06 NOTE — TELEPHONE ENCOUNTER
Jesús Zhong from [employer] was calling in regards to a fax they sent over on 11/21 for the [facility]. I didn't see anything in the pt chart in regards to that fax, Jesús Zhong will be faxing over that form again today.

## 2023-12-11 NOTE — TELEPHONE ENCOUNTER
Message referring to form from [employer]. Form scanned to media on 12/06/2023. Form printed to be refaxed.

## 2023-12-21 ENCOUNTER — OFFICE VISIT (OUTPATIENT)
Dept: UROLOGY | Facility: CLINIC | Age: 63
End: 2023-12-21
Payer: COMMERCIAL

## 2023-12-21 VITALS
DIASTOLIC BLOOD PRESSURE: 80 MMHG | HEART RATE: 62 BPM | OXYGEN SATURATION: 96 % | BODY MASS INDEX: 43.4 KG/M2 | HEIGHT: 69 IN | SYSTOLIC BLOOD PRESSURE: 120 MMHG | WEIGHT: 293 LBS

## 2023-12-21 DIAGNOSIS — R39.11 URINARY HESITANCY: Primary | ICD-10-CM

## 2023-12-21 DIAGNOSIS — R35.1 NOCTURIA: ICD-10-CM

## 2023-12-21 DIAGNOSIS — Z12.5 SCREENING FOR PROSTATE CANCER: ICD-10-CM

## 2023-12-21 LAB
BACTERIA UR QL AUTO: ABNORMAL /HPF
BILIRUB UR QL STRIP: NEGATIVE
CAOX CRY URNS QL MICRO: ABNORMAL /HPF
CLARITY UR: ABNORMAL
COLOR UR: YELLOW
GLUCOSE UR STRIP-MCNC: NEGATIVE MG/DL
HGB UR QL STRIP.AUTO: NEGATIVE
HYALINE CASTS #/AREA URNS LPF: ABNORMAL /LPF
KETONES UR STRIP-MCNC: NEGATIVE MG/DL
LEUKOCYTE ESTERASE UR QL STRIP: NEGATIVE
MUCOUS THREADS UR QL AUTO: ABNORMAL
NITRITE UR QL STRIP: NEGATIVE
NON-SQ EPI CELLS URNS QL MICRO: ABNORMAL /HPF
PH UR STRIP.AUTO: 5.5 [PH]
POST-VOID RESIDUAL VOLUME, ML POC: 69 ML
PROT UR STRIP-MCNC: ABNORMAL MG/DL
RBC #/AREA URNS AUTO: ABNORMAL /HPF
SL AMB  POCT GLUCOSE, UA: NORMAL
SL AMB LEUKOCYTE ESTERASE,UA: NORMAL
SL AMB POCT BILIRUBIN,UA: NORMAL
SL AMB POCT BLOOD,UA: NORMAL
SL AMB POCT CLARITY,UA: CLEAR
SL AMB POCT COLOR,UA: NORMAL
SL AMB POCT KETONES,UA: NORMAL
SL AMB POCT NITRITE,UA: NORMAL
SL AMB POCT PH,UA: 5
SL AMB POCT SPECIFIC GRAVITY,UA: 1030
SL AMB POCT URINE PROTEIN: NORMAL
SL AMB POCT UROBILINOGEN: 1
SP GR UR STRIP.AUTO: 1.02 (ref 1–1.03)
UROBILINOGEN UR STRIP-ACNC: 2 MG/DL
WBC #/AREA URNS AUTO: ABNORMAL /HPF

## 2023-12-21 PROCEDURE — 51798 US URINE CAPACITY MEASURE: CPT | Performed by: PHYSICIAN ASSISTANT

## 2023-12-21 PROCEDURE — 81002 URINALYSIS NONAUTO W/O SCOPE: CPT | Performed by: PHYSICIAN ASSISTANT

## 2023-12-21 PROCEDURE — 99202 OFFICE O/P NEW SF 15 MIN: CPT | Performed by: PHYSICIAN ASSISTANT

## 2023-12-21 PROCEDURE — 87086 URINE CULTURE/COLONY COUNT: CPT | Performed by: PHYSICIAN ASSISTANT

## 2023-12-21 PROCEDURE — 81001 URINALYSIS AUTO W/SCOPE: CPT | Performed by: PHYSICIAN ASSISTANT

## 2023-12-21 NOTE — PROGRESS NOTES
1. Urinary hesitancy  Ambulatory Referral to Urology    POCT urine dip    POCT Measure PVR    Urine culture    Urinalysis with microscopic      2. Nocturia  Ambulatory Referral to Urology    Urine culture    Urinalysis with microscopic      3. Screening for prostate cancer  PSA, Total Screen    Urine culture    Urinalysis with microscopic          Assessment and plan:       BPH with LUTS  -continue tamsulosin  -urine will be submitted for testing    2. Prostate cancer screening  -PSA 0.7 (5/15/23)  -normal PINA       Anel Wiggins PA-C      Chief Complaint     Chief Complaint   Patient presents with    Kidney Cyst     NEW PT          History of Present Illness     Tobi Hoffmann is a 63 y.o. male presenting today for consultation.    US kidney and bladder (2/6/23) with a simple left renal cyst, 2cm.     On tamsulosin 0.4mg QD.     Medical comorbidities include HERD, type 2 diabetes, HTN, arthritis, HLD, depression, obesity.     Last PSA 0.7 (5/15/23)      Urine dip trace leukocytes, nitrite negative, blood positive.   PVR 69mL    AUA SYMPTOM SCORE      Flowsheet Row Most Recent Value   AUA SYMPTOM SCORE    How often have you had a sensation of not emptying your bladder completely after you finished urinating? 2 (P)    How often have you had to urinate again less than two hours after you finished urinating? 1 (P)    How often have you found you stopped and started again several times when you urinate? 2 (P)    How often have you found it difficult to postpone urination? 4 (P)    How often have you had a weak urinary stream? 3 (P)    How often have you had to push or strain to begin urination? 2 (P)    How many times did you most typically get up to urinate from the time you went to bed at night until the time you got up in the morning? 2 (P)    Quality of Life: If you were to spend the rest of your life with your urinary condition just the way it is now, how would you feel about that? 3 (P)    AUA SYMPTOM  SCORE 16 (P)               Laboratory     Lab Results   Component Value Date    CREATININE 1.23 10/09/2023       Lab Results   Component Value Date    PSA 0.7 05/15/2023    PSA 1.2 05/11/2022    PSA 1.2 02/12/2021       Review of Systems     Review of Systems   Constitutional:  Negative for fever.   Cardiovascular:  Negative for chest pain.   Gastrointestinal:  Negative for abdominal pain.   Genitourinary:  Negative for dysuria.   Musculoskeletal:  Positive for back pain.   Neurological:  Positive for weakness. Negative for numbness and headaches.         AUA SYMPTOM SCORE      Flowsheet Row Most Recent Value   AUA SYMPTOM SCORE    How often have you had a sensation of not emptying your bladder completely after you finished urinating? 2 (P)    How often have you had to urinate again less than two hours after you finished urinating? 1 (P)    How often have you found you stopped and started again several times when you urinate? 2 (P)    How often have you found it difficult to postpone urination? 4 (P)    How often have you had a weak urinary stream? 3 (P)    How often have you had to push or strain to begin urination? 2 (P)    How many times did you most typically get up to urinate from the time you went to bed at night until the time you got up in the morning? 2 (P)    Quality of Life: If you were to spend the rest of your life with your urinary condition just the way it is now, how would you feel about that? 3 (P)    AUA SYMPTOM SCORE 16 (P)             Allergies     No Known Allergies    Physical Exam     Physical Exam  Constitutional:       General: He is not in acute distress.     Appearance: Normal appearance. He is normal weight. He is not ill-appearing, toxic-appearing or diaphoretic.   HENT:      Head: Normocephalic and atraumatic.   Eyes:      General:         Right eye: No discharge.         Left eye: No discharge.      Conjunctiva/sclera: Conjunctivae normal.   Pulmonary:      Effort: Pulmonary effort is  "normal. No respiratory distress.   Musculoskeletal:         General: No swelling or tenderness. Normal range of motion.   Skin:     General: Skin is warm and dry.      Coloration: Skin is not jaundiced or pale.   Neurological:      General: No focal deficit present.      Mental Status: He is alert and oriented to person, place, and time.   Psychiatric:         Mood and Affect: Mood normal.         Behavior: Behavior normal.         Thought Content: Thought content normal.           Vital Signs     Vitals:    12/21/23 0931   BP: 120/80   BP Location: Left arm   Patient Position: Sitting   Cuff Size: Standard   Pulse: 62   SpO2: 96%   Weight: 133 kg (293 lb)   Height: 5' 9\" (1.753 m)         Current Medications       Current Outpatient Medications:     Accu-Chek Guide test strip, , Disp: , Rfl:     Accu-Chek Softclix Lancets lancets, , Disp: , Rfl:     Blood Glucose Monitoring Suppl (Accu-Chek Guide Me) w/Device KIT, TEST ONCE DAILY AS DIRECTED, Disp: 100 kit, Rfl: 0    famotidine (PEPCID) 20 mg tablet, TAKE 1 TABLET BY MOUTH TWICE A DAY, Disp: 180 tablet, Rfl: 1    lisinopril (ZESTRIL) 20 mg tablet, Take 1 tablet (20 mg total) by mouth daily, Disp: 90 tablet, Rfl: 2    loratadine (CLARITIN) 10 mg tablet, TAKE 1 TABLET BY MOUTH EVERY DAY (Patient taking differently: Take 10 mg by mouth daily as needed for allergies), Disp: 90 tablet, Rfl: 1    metFORMIN (GLUCOPHAGE) 850 mg tablet, Take 1 tablet (850 mg total) by mouth daily with breakfast, Disp: 180 tablet, Rfl: 2    metoprolol tartrate (LOPRESSOR) 50 mg tablet, Take 1 tablet (50 mg total) by mouth 2 (two) times a day, Disp: 180 tablet, Rfl: 2    rOPINIRole (REQUIP) 1 mg tablet, Take 1 tablet (1 mg total) by mouth daily at bedtime, Disp: 90 tablet, Rfl: 2    simvastatin (ZOCOR) 40 mg tablet, Take 1 tablet (40 mg total) by mouth daily at bedtime, Disp: 90 tablet, Rfl: 2    tamsulosin (FLOMAX) 0.4 mg, Take 1 capsule (0.4 mg total) by mouth daily with dinner, Disp: 90 " capsule, Rfl: 2    cholecalciferol (VITAMIN D3) 25 mcg (1,000 units) tablet, Take 1,000 Units by mouth daily (Patient not taking: Reported on 10/27/2023), Disp: , Rfl:     Multiple Vitamin (multivitamin) capsule, Take 1 capsule by mouth daily (Patient not taking: Reported on 11/27/2023), Disp: , Rfl:       Active Problems     Patient Active Problem List   Diagnosis    Essential hypertension    Current mild episode of major depressive disorder without prior episode ([facility])    Class 3 severe obesity due to excess calories with serious comorbidity and body mass index (BMI) of 45.0 to 49.9 in adult ([facility])    Gastroesophageal reflux disease    Complex tear of medial meniscus of right knee as current injury    MURRAY (dyspnea on exertion)    Mixed hyperlipidemia    Primary osteoarthritis of both knees    JANEY (obstructive sleep apnea)    Seasonal allergies    Type 2 diabetes mellitus, without long-term current use of insulin ([facility])    History of atrial fibrillation    RLS (restless legs syndrome)    Disorder of patellofemoral joint    Other tear of medial meniscus, current injury, left knee, initial encounter    BPH without obstruction/lower urinary tract symptoms    Status post arthroscopic partial medial meniscectomy of left knee         Past Medical History     Past Medical History:   Diagnosis Date    Chronic kidney disease 04/20    Depression 04/20    GERD (gastroesophageal reflux disease)     History of atrial fibrillation     s/p ablation    Hyperlipidemia     ldl 123 from 179 from 138, -. zocor     Hypertension     Irregular heart beat     Afib    Morbid obesity ([facility])     Nocturia     Osteoarthritis     Restless legs     Seasonal allergies     Sleep apnea     no cpap    Type 2 diabetes mellitus, without long-term current use of insulin ([facility]) 09/11/2023    Visual impairment 1970    Not wearing glasses         Surgical History     Past Surgical History:   Procedure Laterality Date    CARDIAC ELECTROPHYSIOLOGY  MAPPING AND ABLATION  2019    CARDIAC SURGERY      Ablation    COLONOSCOPY  01/01/2012    no polyps    COLONOSCOPY      PALATE / UVULA BIOPSY / EXCISION      JANEY    CA ARTHROSCOPY KNEE W/MENISCUS RPR MEDIAL&LATERAL Left 10/17/2023    Procedure: ARTHROSCOPY KNEE PARTIAL MEDIAL MENISECTOMY CHONDROPLASTY;  Surgeon: Xavi Johnson DO;  Location: [facility] MAIN OR;  Service: Orthopedics    CA ARTHRS KNE SURG W/MENISCECTOMY MED/LAT W/SHVG Right 08/05/2021    Procedure: KNEE ARTHROSCOPIC PARTIAL MEDIAL MENISCECTOMY, CHONDROPLASTY;  Surgeon: Sarabjit Gómez MD;  Location: [facility] MAIN OR;  Service: Orthopedics    SHOULDER SURGERY Bilateral     s/p 4 surgery - 2 on each side.         Family History     Family History   Problem Relation Age of Onset    Ovarian cancer Mother 68    Coronary artery disease Father 35    Heart attack Father 35    Diabetes Sister     Obesity Sister     Hypertension Sister     No Known Problems Sister     Pancreatic cancer Brother     Heart attack Brother 30    Coronary artery disease Brother 30    Heart attack Brother 41    Coronary artery disease Brother 41    Coronary artery disease Brother 40    Obesity Brother     Obesity Brother     Diabetes Brother     No Known Problems Brother     No Known Problems Son     No Known Problems Son          Social History     Social History       Radiology       Answers submitted by the patient for this visit:  Back Pain Questionnaire (Submitted on 12/20/2023)  Chief Complaint: Back pain  Chronicity: chronic  Onset: more than 1 year ago  Frequency: daily  Progression since onset: waxing and waning  Pain location: lumbar spine  Pain quality: stabbing  Radiates to: right thigh  Pain - numeric: 5/10  Pain is: worse during the day  Aggravated by: bending, standing, stress, twisting  Stiffness is present: all day  bladder incontinence: Yes  bowel incontinence: Yes  leg pain: No  paresis: No  paresthesias: No  pelvic pain: Yes  perianal numbness: No  tingling: No  weight  loss: Yes  Risk factors: lack of exercise, obesity, poor posture, sedentary lifestyle

## 2023-12-22 LAB — BACTERIA UR CULT: ABNORMAL

## 2024-01-11 ENCOUNTER — HOSPITAL ENCOUNTER (OUTPATIENT)
Dept: NON INVASIVE DIAGNOSTICS | Facility: CLINIC | Age: 64
Discharge: HOME | End: 2024-01-11
Payer: COMMERCIAL

## 2024-01-11 VITALS
BODY MASS INDEX: 43.4 KG/M2 | HEART RATE: 68 BPM | DIASTOLIC BLOOD PRESSURE: 80 MMHG | HEIGHT: 69 IN | SYSTOLIC BLOOD PRESSURE: 120 MMHG | WEIGHT: 293 LBS

## 2024-01-11 DIAGNOSIS — I10 ESSENTIAL HYPERTENSION: ICD-10-CM

## 2024-01-11 DIAGNOSIS — I48.0 PAROXYSMAL ATRIAL FIBRILLATION (HCC): ICD-10-CM

## 2024-01-11 LAB
AORTIC ROOT: 3.6 CM
AORTIC VALVE MEAN VELOCITY: 8.7 M/S
APICAL FOUR CHAMBER EJECTION FRACTION: 54 %
ASCENDING AORTA: 3.8 CM
AV AREA BY CONTINUOUS VTI: 2.8 CM2
AV AREA PEAK VELOCITY: 2.9 CM2
AV LVOT MEAN GRADIENT: 2 MMHG
AV LVOT PEAK GRADIENT: 3 MMHG
AV MEAN GRADIENT: 3 MMHG
AV PEAK GRADIENT: 6 MMHG
AV VALVE AREA: 2.76 CM2
AV VELOCITY RATIO: 0.75
BSA FOR ECHO PROCEDURE: 2.43 M2
DOP CALC AO PEAK VEL: 1.22 M/S
DOP CALC AO VTI: 26.69 CM
DOP CALC LVOT AREA: 3.8 CM2
DOP CALC LVOT CARDIAC INDEX: 1.83 L/MIN/M2
DOP CALC LVOT CARDIAC OUTPUT: 4.44 L/MIN
DOP CALC LVOT DIAMETER: 2.2 CM
DOP CALC LVOT PEAK VEL VTI: 19.41 CM
DOP CALC LVOT PEAK VEL: 0.92 M/S
DOP CALC LVOT STROKE INDEX: 30 ML/M2
DOP CALC LVOT STROKE VOLUME: 73.75
E WAVE DECELERATION TIME: 259 MS
E/A RATIO: 0.9
FRACTIONAL SHORTENING: 28 (ref 28–44)
INTERVENTRICULAR SEPTUM IN DIASTOLE (PARASTERNAL SHORT AXIS VIEW): 1.4 CM
INTERVENTRICULAR SEPTUM: 1.4 CM (ref 0.6–1.1)
LAAS-AP2: 19.3 CM2
LAAS-AP4: 20.7 CM2
LEFT ATRIUM SIZE: 3.1 CM
LEFT ATRIUM VOLUME (MOD BIPLANE): 58 ML
LEFT ATRIUM VOLUME INDEX (MOD BIPLANE): 23.9 ML/M2
LEFT INTERNAL DIMENSION IN SYSTOLE: 2.8 CM (ref 2.1–4)
LEFT VENTRICULAR INTERNAL DIMENSION IN DIASTOLE: 3.9 CM (ref 3.5–6)
LEFT VENTRICULAR POSTERIOR WALL IN END DIASTOLE: 1.4 CM
LEFT VENTRICULAR STROKE VOLUME: 39 ML
LVSV (TEICH): 39 ML
MV E'TISSUE VEL-LAT: 12 CM/S
MV E'TISSUE VEL-SEP: 8 CM/S
MV PEAK A VEL: 0.62 M/S
MV PEAK E VEL: 56 CM/S
PA SYSTOLIC PRESSURE: 25 MMHG
RIGHT ATRIUM AREA SYSTOLE A4C: 17.1 CM2
RIGHT VENTRICLE ID DIMENSION: 3.9 CM
SINOTUBULAR JUNCTION: 3 CM
SL CV LEFT ATRIUM LENGTH A2C: 5.6 CM
SL CV LV EF: 55
SL CV PED ECHO LEFT VENTRICLE DIASTOLIC VOLUME (MOD BIPLANE) 2D: 68 ML
SL CV PED ECHO LEFT VENTRICLE SYSTOLIC VOLUME (MOD BIPLANE) 2D: 29 ML
STJ: 3 CM
TR MAX PG: 24 MMHG
TR PEAK VELOCITY: 2.5 M/S
TRICUSPID ANNULAR PLANE SYSTOLIC EXCURSION: 1.8 CM
TRICUSPID VALVE PEAK REGURGITATION VELOCITY: 2.45 M/S

## 2024-01-11 PROCEDURE — 93306 TTE W/DOPPLER COMPLETE: CPT | Performed by: INTERNAL MEDICINE

## 2024-01-11 PROCEDURE — C8929 TTE W OR WO FOL WCON,DOPPLER: HCPCS

## 2024-01-11 RX ADMIN — PERFLUTREN 1 ML/MIN: 6.52 INJECTION, SUSPENSION INTRAVENOUS at 10:00

## 2024-01-19 ENCOUNTER — VBI (OUTPATIENT)
Dept: ADMINISTRATIVE | Facility: OTHER | Age: 64
End: 2024-01-19

## 2024-01-30 DIAGNOSIS — E11.9 TYPE 2 DIABETES MELLITUS WITHOUT COMPLICATION, WITHOUT LONG-TERM CURRENT USE OF INSULIN (HCC): ICD-10-CM

## 2024-02-15 ENCOUNTER — TELEPHONE (OUTPATIENT)
Dept: GENETICS | Facility: CLINIC | Age: 64
End: 2024-02-15

## 2024-02-15 ENCOUNTER — DOCUMENTATION (OUTPATIENT)
Dept: GENETICS | Facility: CLINIC | Age: 64
End: 2024-02-15

## 2024-02-20 ENCOUNTER — CLINICAL SUPPORT (OUTPATIENT)
Dept: GENETICS | Facility: CLINIC | Age: 64
End: 2024-02-20

## 2024-02-20 ENCOUNTER — LAB (OUTPATIENT)
Dept: LAB | Facility: MEDICAL CENTER | Age: 64
End: 2024-02-20
Payer: COMMERCIAL

## 2024-02-20 ENCOUNTER — DOCUMENTATION (OUTPATIENT)
Dept: GENETICS | Facility: CLINIC | Age: 64
End: 2024-02-20

## 2024-02-20 DIAGNOSIS — Z80.41 FAMILY HISTORY OF MALIGNANT NEOPLASM OF OVARY: ICD-10-CM

## 2024-02-20 DIAGNOSIS — E11.9 TYPE 2 DIABETES MELLITUS WITHOUT COMPLICATION, WITHOUT LONG-TERM CURRENT USE OF INSULIN (HCC): ICD-10-CM

## 2024-02-20 DIAGNOSIS — Z80.41 FAMILY HISTORY OF OVARIAN CANCER: Primary | ICD-10-CM

## 2024-02-20 DIAGNOSIS — F32.0 CURRENT MILD EPISODE OF MAJOR DEPRESSIVE DISORDER WITHOUT PRIOR EPISODE (HCC): ICD-10-CM

## 2024-02-20 DIAGNOSIS — Z80.0 FAMILY HISTORY OF PANCREATIC CANCER: ICD-10-CM

## 2024-02-20 DIAGNOSIS — Z80.0 FAMILY HISTORY OF MALIGNANT NEOPLASM OF GASTROINTESTINAL TRACT: ICD-10-CM

## 2024-02-20 DIAGNOSIS — Z82.49 FAMILY HISTORY OF EARLY CAD: ICD-10-CM

## 2024-02-20 DIAGNOSIS — E78.2 MIXED HYPERLIPIDEMIA: ICD-10-CM

## 2024-02-20 DIAGNOSIS — E66.01 CLASS 3 SEVERE OBESITY DUE TO EXCESS CALORIES WITH SERIOUS COMORBIDITY AND BODY MASS INDEX (BMI) OF 45.0 TO 49.9 IN ADULT (HCC): ICD-10-CM

## 2024-02-20 DIAGNOSIS — I10 ESSENTIAL HYPERTENSION: ICD-10-CM

## 2024-02-20 LAB
ALBUMIN SERPL BCP-MCNC: 3.7 G/DL (ref 3.5–5)
ALP SERPL-CCNC: 45 U/L (ref 34–104)
ALT SERPL W P-5'-P-CCNC: 28 U/L (ref 7–52)
ANION GAP SERPL CALCULATED.3IONS-SCNC: 9 MMOL/L
AST SERPL W P-5'-P-CCNC: 23 U/L (ref 13–39)
BILIRUB SERPL-MCNC: 0.61 MG/DL (ref 0.2–1)
BUN SERPL-MCNC: 19 MG/DL (ref 5–25)
CALCIUM SERPL-MCNC: 10.2 MG/DL (ref 8.4–10.2)
CHLORIDE SERPL-SCNC: 103 MMOL/L (ref 96–108)
CO2 SERPL-SCNC: 26 MMOL/L (ref 21–32)
CREAT SERPL-MCNC: 1.35 MG/DL (ref 0.6–1.3)
EST. AVERAGE GLUCOSE BLD GHB EST-MCNC: 126 MG/DL
GFR SERPL CREATININE-BSD FRML MDRD: 55 ML/MIN/1.73SQ M
GLUCOSE P FAST SERPL-MCNC: 92 MG/DL (ref 65–99)
HBA1C MFR BLD: 6 %
LDLC SERPL DIRECT ASSAY-MCNC: 179 MG/DL (ref 0–100)
POTASSIUM SERPL-SCNC: 4.3 MMOL/L (ref 3.5–5.3)
PROT SERPL-MCNC: 7.8 G/DL (ref 6.4–8.4)
SODIUM SERPL-SCNC: 138 MMOL/L (ref 135–147)
TSH SERPL DL<=0.05 MIU/L-ACNC: 0.62 UIU/ML (ref 0.45–4.5)

## 2024-02-20 PROCEDURE — 36415 COLL VENOUS BLD VENIPUNCTURE: CPT

## 2024-02-20 PROCEDURE — 83721 ASSAY OF BLOOD LIPOPROTEIN: CPT

## 2024-02-20 PROCEDURE — 80053 COMPREHEN METABOLIC PANEL: CPT

## 2024-02-20 PROCEDURE — 83036 HEMOGLOBIN GLYCOSYLATED A1C: CPT

## 2024-02-20 PROCEDURE — 84443 ASSAY THYROID STIM HORMONE: CPT

## 2024-02-20 NOTE — PROGRESS NOTES
Pre-Test Genetic Counseling Consult Note    Patient Name: Tobi Hoffmann   /Age: 1960/63 y.o.  Referring Provider: Britney Monson DO     Date of Service: 2024  Genetic Counselor: Mary Carmen Davis MS, [facility] [place]pretation Services: None  Location: In-person consult at [place]  Length of Visit: 30 Minutes    Tobi was referred to the [facility] Risk and Genetic Assessment Program due to his family history of pancreatic and ovarian cancer . he presents today to discuss the possibility of a hereditary cancer syndrome, options for genetic testing, and implications for him and his family.        Cancer History and Treatment:   Personal History:   Oncology History    No history exists.     Screening Hx:   Colon:  Colonoscopy: negative cologuard     Skin:  No current screening    Prostate:  Prostate screening: wnl    Other screening: none    Medical and Surgical History  Pertinent surgical history:   Past Surgical History:   Procedure Laterality Date    CARDIAC ELECTROPHYSIOLOGY MAPPING AND ABLATION  2019    CARDIAC SURGERY      Ablation    COLONOSCOPY  2012    no polyps    COLONOSCOPY      PALATE / UVULA BIOPSY / EXCISION      JANEY    TN ARTHROSCOPY KNEE W/MENISCUS RPR MEDIAL&LATERAL Left 10/17/2023    Procedure: ARTHROSCOPY KNEE PARTIAL MEDIAL MENISECTOMY CHONDROPLASTY;  Surgeon: Xavi Johnson DO;  Location: [facility] MAIN OR;  Service: Orthopedics    TN ARTHRS KNE SURG W/MENISCECTOMY MED/LAT W/SHVG Right 2021    Procedure: KNEE ARTHROSCOPIC PARTIAL MEDIAL MENISCECTOMY, CHONDROPLASTY;  Surgeon: Sarabjit Gómez MD;  Location: [facility] MAIN OR;  Service: Orthopedics    SHOULDER SURGERY Bilateral     s/p 4 surgery - 2 on each side.      Pertinent medical history:  Past Medical History:   Diagnosis Date    Chronic kidney disease     Depression     GERD (gastroesophageal reflux disease)     History of atrial fibrillation     s/p ablation    Hyperlipidemia     ldl 123 from 179  "from 138, -. zocor     Hypertension     Irregular heart beat     Afib    Morbid obesity (HCC)     Nocturia     Osteoarthritis     Restless legs     Seasonal allergies     Sleep apnea     no cpap    Type 2 diabetes mellitus, without long-term current use of insulin (HCC) 09/11/2023    Visual impairment 1970    Not wearing glasses         Other History:  Height:   Ht Readings from Last 1 Encounters:   01/11/24 5' 9\" (1.753 m)     Weight:   Wt Readings from Last 1 Encounters:   01/11/24 133 kg (293 lb)       Relevant Family History   Patient reports no Ashkenazi Hindu ancestry.     Brother (d.65) history of pancreatic cancer (dx 65)  No other reported history of cancer in 8 other siblings, 2 passed in their 30s from cardiac disease    Maternal Family History:  Mother (d.72) history of ovarian cancer (dx 70)  No other information on maternal aunts, uncles, cousins, or grandparents    Paternal Family History:   Father passed in 30s from cardiac disease   No other information on maternal aunts, uncles, cousins, or grandparents    Please refer to the scanned pedigree in the Media Tab for a complete family history     *All history is reported as provided by the patient; records are not available for review, except where indicated.     Assessment:  We discussed sporadic, familial and hereditary cancer.  We also discussed the many factors that influence our risk for cancer such as age, environmental exposures, lifestyle choices and family history.      We reviewed the indications suggestive of a hereditary predisposition to cancer.    Tobi is unaffected, but is considering genetic testing due to a family history of pancreatic and ovarian cancer. Although Tobi's mother and brother is the most informative person to undergo genetic testing, Tobi can consider genetic testing due to the following:   Patient does not have cancer but is the most informative person to test because their mother and brother are " .    Genetic testing is indicated for Tobi based on the following criteria: Meets NCCN V2.2024 Testing Criteria for Ovarian Cancer Susceptibility Genes: family history of a first-degree relative diagnosed with ovarian cancer  Meets NCCN V2.2024 Testing Criteria for Pancreatic Cancer Susceptibility Genes: family history of a first-degree relative diagnosed with pancreatic cancer    The risks, benefits, and limitations of genetic testing were reviewed with the patient, as well as genetic discrimination laws, and possible test results (positive, negative, variants of uncertain significance) and their clinical implications. If positive for a mutation, options for managing cancer risk including increased surveillance, chemoprevention, and in some cases prophylactic surgery were discussed. Tobi was informed that if a hereditary cancer syndrome was identified in him, first degree relatives (parents, siblings, and children) have a chance of also inheriting the condition. Genetic testing would allow for predictive genetic testing in other relatives, who may also be at risk depending on their degree of relation.     Billing:  Most individuals pay <$100 for hereditary cancer genetic testing. If insurance covers the cost of the testing, individuals may still pay out of pocket secondary to co-pays, co-insurance, or deductibles. If the cost of the testing exceeds $100, the lab will reach out to the patient via phone or e-mail. The patient will then have the option to proceed with the testing, cancel the testing, or elect the self-pay option of $250. Tobi verbalized understanding.     Plan: Patient decided to proceed with testing and provided consent.    Summary:     Sample Collection:  The patient's blood sample was drawn in the office on  by medical assistant Ibrahima Echavarria    Genetic Testing Preformed: CustomNext: Cancer® +RNAinsight® (59 genes): APC, NADIR, AXIN2, BAP1, BARD1, BMPR1A, BRCA1, BRCA2, BRIP1, CDH1,  CDK4, CDKN1B, CDKN2A, CHEK2, CTNNA1, DICER1, EGLN1, EPCAM, FH, FLCN, GREM1, HOXB13, KIF1B, KIT, MAX, MEN1, MET, MITF, MLH1, MSH2, MSH3, MSH6, MUTYH, NF1, NTHL1, PALB2, PDGFRA PMS2, POLD1, POLE, POT1, PTEN, RAD51C, RAD51D, RB1, RET, SDHA, SDHAF2, SDHB, SDHC, SDHD, SMAD4, SMARCA4, STK11, [id number], TP53, TSC1, TSC2, VHL       Result Call Information:  In the event that we need to reach Tobi via telephone:  I confirmed the patient's mobile number on file as the best number to call with results  I confirmed with the patient that we can leave a voicemail on the provided numbers    Results take approximately 2-3 weeks to complete once test is started.    Tobi will be notified via [employer] once results are available.      Additional recommendations for surveillance/medical management will be made pending genetic test results.

## 2024-02-20 NOTE — LETTER
2024     Britney Monson DO  [address]  [address]  [place] [address]    Patient: Tobi Hoffmann  YOB: 1960  Date of Visit: 2024      Dear Dr. Monson:    Thank you for referring Tobi Hoffmann to me for evaluation. Below are my notes for this consultation.    If you have questions, please do not hesitate to call me. I look forward to following your patient along with you.         Sincerely,        Mary Carmen Davis        CC: No Recipients        Pre-Test Genetic Counseling Consult Note    Patient Name: Tobi Hoffmann   /Age: 1960/63 y.o.  Referring Provider: Britney Monson DO     Date of Service: 2024  Genetic Counselor: Mary Carmen Davis MS, [facility]  Interpretation Services: None  Location: In-person consult at [place]  Length of Visit: 30 Minutes    Tobi was referred to the [facility] Cancer Risk and Genetic Assessment Program due to his family history of pancreatic and ovarian cancer . he presents today to discuss the possibility of a hereditary cancer syndrome, options for genetic testing, and implications for him and his family.        Cancer History and Treatment:   Personal History:   Oncology History    No history exists.     Screening Hx:   Colon:  Colonoscopy: negative cologuard     Skin:  No current screening    Prostate:  Prostate screening: wnl    Other screening: none    Medical and Surgical History  Pertinent surgical history:   Past Surgical History:   Procedure Laterality Date   • CARDIAC ELECTROPHYSIOLOGY MAPPING AND ABLATION     • CARDIAC SURGERY      Ablation   • COLONOSCOPY  2012    no polyps   • COLONOSCOPY     • PALATE / UVULA BIOPSY / EXCISION      JANEY   • [place] ARTHROSCOPY KNEE W/MENISCUS RPR MEDIAL&LATERAL Left 10/17/2023    Procedure: ARTHROSCOPY KNEE PARTIAL MEDIAL MENISECTOMY CHONDROPLASTY;  Surgeon: Xavi Johnson DO;  Location: [facility] MAIN OR;  Service: Orthopedics   • [place] ARTHRS KNE SURG W/MENISCECTOMY  "MED/LAT W/SHVG Right 08/05/2021    Procedure: KNEE ARTHROSCOPIC PARTIAL MEDIAL MENISCECTOMY, CHONDROPLASTY;  Surgeon: Sarabjit Gómez MD;  Location: [facility] MAIN OR;  Service: Orthopedics   • SHOULDER SURGERY Bilateral     s/p 4 surgery - 2 on each side.      Pertinent medical history:  Past Medical History:   Diagnosis Date   • Chronic kidney disease 04/20   • Depression 04/20   • GERD (gastroesophageal reflux disease)    • History of atrial fibrillation     s/p ablation   • Hyperlipidemia     ldl 123 from 179 from 138, -. zocor    • Hypertension    • Irregular heart beat     Afib   • Morbid obesity (HCC)    • Nocturia    • Osteoarthritis    • Restless legs    • Seasonal allergies    • Sleep apnea     no cpap   • Type 2 diabetes mellitus, without long-term current use of insulin (HCC) 09/11/2023   • Visual impairment 1970    Not wearing glasses         Other History:  Height:   Ht Readings from Last 1 Encounters:   01/11/24 5' 9\" (1.753 m)     Weight:   Wt Readings from Last 1 Encounters:   01/11/24 133 kg (293 lb)       Relevant Family History   Patient reports no Ashkenazi Sikhism ancestry.     Brother (d.65) history of pancreatic cancer (dx 65)  No other reported history of cancer in 8 other siblings, 2 passed in their 30s from cardiac disease    Maternal Family History:  Mother (d.72) history of ovarian cancer (dx 70)  No other information on maternal aunts, uncles, cousins, or grandparents    Paternal Family History:   Father passed in 30s from cardiac disease   No other information on maternal aunts, uncles, cousins, or grandparents    Please refer to the scanned pedigree in the Media Tab for a complete family history     *All history is reported as provided by the patient; records are not available for review, except where indicated.     Assessment:  We discussed sporadic, familial and hereditary cancer.  We also discussed the many factors that influence our risk for cancer such as age, environmental " exposures, lifestyle choices and family history.      We reviewed the indications suggestive of a hereditary predisposition to cancer.    Tobi is unaffected, but is considering genetic testing due to a family history of pancreatic and ovarian cancer. Although Tobi's mother and brother is the most informative person to undergo genetic testing, Tobi can consider genetic testing due to the following:   Patient does not have cancer but is the most informative person to test because their mother and brother are .    Genetic testing is indicated for Tobi based on the following criteria: Meets NCCN V2.2024 Testing Criteria for Ovarian Cancer Susceptibility Genes: family history of a first-degree relative diagnosed with ovarian cancer  Meets NCCN V2.2024 Testing Criteria for Pancreatic Cancer Susceptibility Genes: family history of a first-degree relative diagnosed with pancreatic cancer    The risks, benefits, and limitations of genetic testing were reviewed with the patient, as well as genetic discrimination laws, and possible test results (positive, negative, variants of uncertain significance) and their clinical implications. If positive for a mutation, options for managing cancer risk including increased surveillance, chemoprevention, and in some cases prophylactic surgery were discussed. Tobi was informed that if a hereditary cancer syndrome was identified in him, first degree relatives (parents, siblings, and children) have a chance of also inheriting the condition. Genetic testing would allow for predictive genetic testing in other relatives, who may also be at risk depending on their degree of relation.     Billing:  Most individuals pay <$100 for hereditary cancer genetic testing. If insurance covers the cost of the testing, individuals may still pay out of pocket secondary to co-pays, co-insurance, or deductibles. If the cost of the testing exceeds $100, the lab will reach out to the patient via  phone or e-mail. The patient will then have the option to proceed with the testing, cancel the testing, or elect the self-pay option of $250. Tobi verbalized understanding.     Plan: Patient decided to proceed with testing and provided consent.    Summary:     Sample Collection:  The patient's blood sample was drawn in the office on 2/20 by medical assistant Ibrahima Echavarria    Genetic Testing Preformed: CustomNext: Cancer® +RNAinsight® (59 genes): APC, NADIR, AXIN2, BAP1, BARD1, BMPR1A, BRCA1, BRCA2, BRIP1, CDH1, CDK4, CDKN1B, CDKN2A, CHEK2, CTNNA1, DICER1, EGLN1, EPCAM, FH, FLCN, GREM1, HOXB13, KIF1B, KIT, MAX, MEN1, MET, MITF, MLH1, MSH2, MSH3, MSH6, MUTYH, NF1, NTHL1, PALB2, PDGFRA PMS2, POLD1, POLE, POT1, PTEN, RAD51C, RAD51D, RB1, RET, SDHA, SDHAF2, SDHB, SDHC, SDHD, SMAD4, SMARCA4, STK11, [id number], TP53, TSC1, TSC2, VHL       Result Call Information:  In the event that we need to reach Tobi via telephone:  I confirmed the patient's mobile number on file as the best number to call with results  I confirmed with the patient that we can leave a voicemail on the provided numbers    Results take approximately 2-3 weeks to complete once test is started.    Tobi will be notified via [employer] once results are available.      Additional recommendations for surveillance/medical management will be made pending genetic test results.

## 2024-02-21 NOTE — RESULT ENCOUNTER NOTE
Unstable labs - will review with patient at upcoming appointment.    Hyperlipidemia - Worsening.  LDL not at goal for DM2.  To consider D/C Zocor 40mg QHS and start Lipitor 20mg QHS?

## 2024-02-21 NOTE — RESULT ENCOUNTER NOTE
Stable labs - will review with patient at upcoming appointment.    DM2 - To consider GLP-1A or SGLT2I?

## 2024-03-14 ENCOUNTER — TELEPHONE (OUTPATIENT)
Dept: GENETICS | Facility: CLINIC | Age: 64
End: 2024-03-14

## 2024-03-14 NOTE — TELEPHONE ENCOUNTER
Post-Test Genetic Counseling Consult Note    Today I left a voicemail for Tobi reviewing the results of his genetic test for hereditary cancer. Tobi met previously with Mary Carmen [facility] on 2/20/24 for pre-test counseling.  I encouraged Tobi to call [phone] to discuss this result further. A copy of this consult note and genetic test result will be shared with the patient.      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 The significance of the PALB2 variant is currently not known and therefore this test result cannot be used to help determine Tobi cancer risks.      It is possible that the variant was seen in only a handful of individuals, or there may be conflicting or incomplete information in the medical literature about the variant and its association with hereditary cancer.     Risk Based on Family History:  The significance of this variant is currently not known and therefore this test result cannot be used to help determine Tobi cancer risks. Rather his personal medical history and family history of cancer are the most important factors used to estimate his risk for developing certain cancers and to direct his medical management.      Tobi's  risk of pancreatic cancer is increased, based on the reported family history. As compared with the general population risk of approximately 1-2%, empiric data suggest that Pur[place] risk to develop pancreatic cancer is approximately 4-6% given one first-degree relative with pancreatic cancer (PMID: 32369885).     Risks and Testing for Family Members:  Genetic testing for this variant is not recommended for relatives who wish to determine their cancer risks for purposes of determining medical management. The presence or absence of this variant in a relative is not clinically meaningful unless the variant is reclassified in the future.     The laboratory will continue to accumulate information on this variant and will reclassify it as either a positive or negative genetic test result when they are confident that they have adequate information. As updated information is obtained, we will notify Tobi with the updated information. It is important to note that the majority of variants of uncertain significance are reclassified as likely benign or benign as additional information about the variant becomes available.     Despite this result, Tobi's first-degree relatives may be at increased risk for the cancers based on the family history. We recommend they discuss screening and management recommendations with their healthcare providers.    If Tobi has any affected family members with a cancer diagnosis, especially at a young age, they may still consider genetic testing. Relatives who wish to pursue genetic testing can reach out to the [facility] [phone] to schedule an appointment or visit [email or web address] to identify a local genetic counselor.     Additional Information:  A healthy lifestyle will improve overall health and reduce risk for illness. Eating a healthy diet and exercising for 4 hours per week is recommended. Both diet and exercise have been shown to help maintain a healthy weight.  Moderate to heavy alcohol use can increase the risk for some cancers. Smoking cigarettes can also increase risk for breast, lung, prostate, pancreatic and other cancers.      Plan:   There are no additional recommendations based on Tobi's result. he should continue cancer screening and medical management as clinically indicated and as determined appropriate by his healthcare providers.    VUS Result: Tobi was strongly encouraged to contact us regarding any changes in his personal or family history of cancer as these changes could alter our recommendation regarding genetic testing and/or cancer screening. Tobi was also encouraged to follow up with us on an annual basis as variant classifications are subject to change.

## 2024-03-18 ENCOUNTER — OFFICE VISIT (OUTPATIENT)
Dept: FAMILY MEDICINE CLINIC | Facility: CLINIC | Age: 64
End: 2024-03-18
Payer: COMMERCIAL

## 2024-03-18 VITALS
TEMPERATURE: 99 F | OXYGEN SATURATION: 94 % | HEART RATE: 70 BPM | BODY MASS INDEX: 43.31 KG/M2 | DIASTOLIC BLOOD PRESSURE: 78 MMHG | WEIGHT: 292.4 LBS | HEIGHT: 69 IN | SYSTOLIC BLOOD PRESSURE: 112 MMHG | RESPIRATION RATE: 18 BRPM

## 2024-03-18 DIAGNOSIS — N40.0 BPH WITHOUT OBSTRUCTION/LOWER URINARY TRACT SYMPTOMS: ICD-10-CM

## 2024-03-18 DIAGNOSIS — J30.2 SEASONAL ALLERGIES: ICD-10-CM

## 2024-03-18 DIAGNOSIS — E78.2 MIXED HYPERLIPIDEMIA: ICD-10-CM

## 2024-03-18 DIAGNOSIS — F32.0 CURRENT MILD EPISODE OF MAJOR DEPRESSIVE DISORDER WITHOUT PRIOR EPISODE (HCC): ICD-10-CM

## 2024-03-18 DIAGNOSIS — G47.33 OSA (OBSTRUCTIVE SLEEP APNEA): ICD-10-CM

## 2024-03-18 DIAGNOSIS — E11.9 TYPE 2 DIABETES MELLITUS WITHOUT COMPLICATION, WITHOUT LONG-TERM CURRENT USE OF INSULIN (HCC): ICD-10-CM

## 2024-03-18 DIAGNOSIS — Z23 ENCOUNTER FOR IMMUNIZATION: ICD-10-CM

## 2024-03-18 DIAGNOSIS — K21.9 GASTROESOPHAGEAL REFLUX DISEASE, UNSPECIFIED WHETHER ESOPHAGITIS PRESENT: ICD-10-CM

## 2024-03-18 DIAGNOSIS — Z86.79 HISTORY OF ATRIAL FIBRILLATION: ICD-10-CM

## 2024-03-18 DIAGNOSIS — E66.01 MORBID OBESITY WITH BMI OF 40.0-44.9, ADULT (HCC): ICD-10-CM

## 2024-03-18 DIAGNOSIS — G25.81 RLS (RESTLESS LEGS SYNDROME): ICD-10-CM

## 2024-03-18 DIAGNOSIS — I10 ESSENTIAL HYPERTENSION: ICD-10-CM

## 2024-03-18 DIAGNOSIS — E66.01 CLASS 3 SEVERE OBESITY DUE TO EXCESS CALORIES WITH SERIOUS COMORBIDITY AND BODY MASS INDEX (BMI) OF 40.0 TO 44.9 IN ADULT (HCC): ICD-10-CM

## 2024-03-18 DIAGNOSIS — Z00.00 MEDICARE ANNUAL WELLNESS VISIT, SUBSEQUENT: Primary | ICD-10-CM

## 2024-03-18 DIAGNOSIS — M17.0 PRIMARY OSTEOARTHRITIS OF BOTH KNEES: ICD-10-CM

## 2024-03-18 PROCEDURE — G0439 PPPS, SUBSEQ VISIT: HCPCS | Performed by: FAMILY MEDICINE

## 2024-03-18 PROCEDURE — G0442 ANNUAL ALCOHOL SCREEN 15 MIN: HCPCS | Performed by: FAMILY MEDICINE

## 2024-03-18 PROCEDURE — 99214 OFFICE O/P EST MOD 30 MIN: CPT | Performed by: FAMILY MEDICINE

## 2024-03-18 RX ORDER — TAMSULOSIN HYDROCHLORIDE 0.4 MG/1
0.4 CAPSULE ORAL
Start: 2024-03-18

## 2024-03-18 RX ORDER — ZOSTER VACCINE RECOMBINANT, ADJUVANTED 50 MCG/0.5
0.5 KIT INTRAMUSCULAR ONCE
Qty: 1 EACH | Refills: 1 | Status: SHIPPED | OUTPATIENT
Start: 2024-03-18 | End: 2024-03-18

## 2024-03-18 RX ORDER — BLOOD-GLUCOSE SENSOR
1 EACH MISCELLANEOUS
Qty: 6 EACH | Refills: 3 | Status: SHIPPED | OUTPATIENT
Start: 2024-03-18

## 2024-03-18 RX ORDER — BLOOD-GLUCOSE,RECEIVER,CONT
1 EACH MISCELLANEOUS ONCE
Qty: 1 EACH | Refills: 0 | Status: SHIPPED | OUTPATIENT
Start: 2024-03-18 | End: 2024-03-18

## 2024-03-18 RX ORDER — ATORVASTATIN CALCIUM 20 MG/1
20 TABLET, FILM COATED ORAL EVERY EVENING
Qty: 30 TABLET | Refills: 8 | Status: SHIPPED | OUTPATIENT
Start: 2024-03-18 | End: 2024-09-14

## 2024-03-18 NOTE — PROGRESS NOTES
Assessment/Plan:  Problem List Items Addressed This Visit        Cardiovascular and Mediastinum    Essential hypertension     Stable.  Check blood pressure outside of office.  Recommend lifestyle modifications.         Relevant Orders    CBC and differential    Comprehensive metabolic panel       Respiratory    JANEY (obstructive sleep apnea)     Patient intolerant of CPAP.  He is interested in [employer].              Digestive    Gastroesophageal reflux disease     Stable. Continue Pepcid 20mg BID PRN.  Watch GERD diet.  To consider GI referral for EGD if uncontrolled?              Endocrine    Type 2 diabetes mellitus, without long-term current use of insulin ([facility])       Lab Results   Component Value Date    HGBA1C 6.0 (H) 02/20/2024     Stable.  Continue Metformin.  Start Rybelsus 7mg QD.  Patient declines DM education 9/12/23.  Recommend lifestyle modifications.    Goal blood sugars:  Fasting in AM - Less than 100. 2 hours after any meal - Less than 140.          Relevant Medications    semaglutide (Rybelsus) 3 MG tablet    semaglutide (Rybelsus) 7 MG tablet (Start on 4/17/2024)    atorvastatin (LIPITOR) 20 mg tablet    Continuous Blood Gluc Sensor (FreeStyle Thai 3 Sensor) MISC    Other Relevant Orders    Comprehensive metabolic panel    Comprehensive metabolic panel    Comprehensive metabolic panel    Hemoglobin A1C       Musculoskeletal and Integument    Primary osteoarthritis of both knees     Management per Ortho.      You may use Tylenol (Acetaminophen) up to 3,000mg daily (in 24 hours) as needed for pain or fever.            Genitourinary    BPH without obstruction/lower urinary tract symptoms     Management per Uro.  Continue Flomax 0.4mg QHS.           Relevant Medications    tamsulosin (FLOMAX) 0.4 mg       Behavioral Health    Current mild episode of major depressive disorder without prior episode (HCC)     Stable s meds and counseling.           Relevant Orders    TSH, 3rd generation with Free T4 reflex        Neurology/Sleep    RLS (restless legs syndrome)     Stable on Requip 1 mg nightly.            Other    Class 3 severe obesity due to excess calories with serious comorbidity and body mass index (BMI) of 40.0 to 44.9 in adult ([facility])     Stable.  Recommend lifestyle modifications.  Patient previously on Ozempic, but D/C due to cost.  To consider Weight Management referral PRN?         Relevant Orders    TSH, 3rd generation with Free T4 reflex    Mixed hyperlipidemia     LDL not at goal for DM2.  D/C Zocor 40mg QHS.  Start Lipitor 20mg QHS.  Recommend lifestyle modifications.         Relevant Medications    atorvastatin (LIPITOR) 20 mg tablet    Other Relevant Orders    Lipid panel    TSH, 3rd generation with Free T4 reflex    LDL cholesterol, direct    Seasonal allergies     Stable on Claritin 10 mg daily.         History of atrial fibrillation     Management per Cardio.  Stable s/p ablation.        Other Visit Diagnoses     Medicare annual wellness visit, subsequent    -  Primary    Morbid obesity with BMI of 40.0-44.9, adult ([facility])        Encounter for immunization               Return in about 4 months (around 7/18/2024) for 4mo - DM2, HTN, HL, Dep, CKD, GERD, Afib, RLS, Labs.      Future Appointments   Date Time Provider Department Center   3/27/2024  2:15 PM Rao Chacko MD [facility]   6/21/2024 11:30 AM Manjinder Meehan PA-C [facility]   7/16/2024  3:20 PM Britney Monson DO  [facility]        Subjective:     Tobi is a 63 y.o. male who presents today for a follow-up on his chronic medical conditions.        HPI:  Chief Complaint   Patient presents with   • Medicare Wellness Visit     AWV / 4mo - DM2, HTN, HL, Dep, CKD, GERD, Afib, RLS, Labs. No new problems or concerns at this time.    •      Has not scheduled or completed DM Eye. Rx shingrix pending. No additional covid boosters.      -- Above per clinical staff and reviewed. --      Diabetes  He presents  for his follow-up diabetic visit. He has type 2 diabetes mellitus. There are no hypoglycemic associated symptoms. Pertinent negatives for diabetes include no chest pain and no fatigue. Risk factors for coronary artery disease include diabetes mellitus, hypertension, male sex, obesity and dyslipidemia. Current diabetic treatment includes diet. His weight is stable. He is following a generally unhealthy diet. He rarely participates in exercise. (Not checking BS at home.  ) An ACE inhibitor/angiotensin II receptor blocker is being taken. He does not see a podiatrist.Eye exam is not current.         Today:      Return in about 4 months (around 2/27/2024) for AWV / 4mo - DM2, HTN, HL, Dep, CKD, GERD, Afib, RLS, Labs     4mo OV          Morbid Obesity - Not watching diet.  No regular exercise due to knee pain.  Does yard work.  Previously on Ozempic, but stopped after a couple months in 2021 as Rx was too expensive previously.       DM2 - On Metformin 850mg 1 pill BID (no higher dose previously).  No Optho.  No Podiatry.  Declines DM Education 9/12/23.  No low BS.          HTN - On Metoprolol tartrate 50mg BID, Lisinopril 20mg QD.  No BP check outside of office.  Has wrist cuff at home.       Hyperlipidemia -  On Zocor 40mg QHS.  No higher dose or other statins previously.       CKD Stage 3 - No Renal consult previously.       Afib /Fam Hx Early CAD - Management per Cardio Dr. Spencer - Next appt 11/24.  On Metoprolol 50mg BID. Previously on Eliquis, which was D/C s/p ablation, failed cardioversion previously.         JANEY - Management per Sleep Medicine - Pending consult c Dr. Chacko 3/24.  He is CPAP intolerant despite trying different masks.       GERD - On Pepcid 20mg BID PRN - using QD, and D/C Omeprazole 20mg QD (after taking for years) x 6 weeks.  No GI consult or EGD previously.  No other GERD meds.  Watching GERD diet.     Depression - Stable s meds.  Previously on unknown med D/C after 1-2 days due to weird  nightmares.  No counseling previously.  Poor social supports.  No SI/HI/AH/VH.         PHQ-2/9 Depression Screening    Little interest or pleasure in doing things: 0 - not at all  Feeling down, depressed, or hopeless: 0 - not at all  Trouble falling or staying asleep, or sleeping too much: 1 - several days  Feeling tired or having little energy: 1 - several days  Poor appetite or overeatin - several days  Feeling bad about yourself - or that you are a failure or have let yourself or your family down: 0 - not at all  Trouble concentrating on things, such as reading the newspaper or watching television: 0 - not at all  Moving or speaking so slowly that other people could have noticed. Or the opposite - being so fidgety or restless that you have been moving around a lot more than usual: 0 - not at all  Thoughts that you would be better off dead, or of hurting yourself in some way: 0 - not at all  PHQ-9 Score: 3  PHQ-9 Interpretation: No or Minimal depression       JAKE-7 Flowsheet Screening    Flowsheet Row Most Recent Value   Over the last 2 weeks, how often have you been bothered by any of the following problems?    Feeling nervous, anxious, or on edge 0   Not being able to stop or control worrying 0   Worrying too much about different things 0   Trouble relaxing 0   Being so restless that it is hard to sit still 0   Becoming easily annoyed or irritable 0   Feeling afraid as if something awful might happen 0   JAKE-7 Total Score 0          RLS - Stable on Requp 1mg QHS.  No higher dose or other meds previously.         OA B/L Knees - Management per Ortho Dr. Johnson.  Next appt PRN.  Declined PT.       AR - On Claritin 10mg QD.       Nocturia - Management per Uro Yasmin Mlahotra PA-C - Next appt .  On Flomax 0.4mg QHS.  Does not see Uro.  Nocturia x 2.  Has urinary hesitancy.         Reviewed:  Labs 24, Ortho 11/10/23, Uro 23, Cardio 23     Sees Uro - Uro Yasmin Malhotra PA-C - Next appt .      The following portions of the patient's history were reviewed and updated as appropriate: allergies, current medications, past family history, past medical history, past social history, past surgical history and problem list.      Review of Systems   Constitutional:  Negative for appetite change, chills, diaphoresis, fatigue and fever.   Respiratory:  Negative for chest tightness and shortness of breath.    Cardiovascular:  Negative for chest pain and palpitations.   Gastrointestinal:  Negative for abdominal pain, blood in stool, diarrhea, nausea and vomiting.   Genitourinary:  Negative for dysuria.   Musculoskeletal:  Positive for arthralgias.        Current Outpatient Medications   Medication Sig Dispense Refill   • Accu-Chek Guide test strip      • Accu-Chek Softclix Lancets lancets      • atorvastatin (LIPITOR) 20 mg tablet Take 1 tablet (20 mg total) by mouth every evening 30 tablet 8   • Blood Glucose Monitoring Suppl (Accu-Chek Guide Me) w/Device KIT TEST ONCE DAILY AS DIRECTED 100 kit 0   • Continuous Blood Gluc Sensor (FreeStyle Thai 3 Sensor) [facility] Use 1 each every 14 (fourteen) days 6 each 3   • famotidine (PEPCID) 20 mg tablet TAKE 1 TABLET BY MOUTH TWICE A  tablet 1   • lisinopril (ZESTRIL) 20 mg tablet Take 1 tablet (20 mg total) by mouth daily 90 tablet 2   • loratadine (CLARITIN) 10 mg tablet TAKE 1 TABLET BY MOUTH EVERY DAY (Patient taking differently: Take 10 mg by mouth daily as needed for allergies) 90 tablet 1   • metFORMIN (GLUCOPHAGE) 850 mg tablet TAKE 1 TABLET BY MOUTH 2 TIMES A DAY WITH MEALS. 180 tablet 1   • metoprolol tartrate (LOPRESSOR) 50 mg tablet Take 1 tablet (50 mg total) by mouth 2 (two) times a day 180 tablet 2   • Multiple Vitamin (multivitamin) capsule Take 1 capsule by mouth daily     • rOPINIRole (REQUIP) 1 mg tablet Take 1 tablet (1 mg total) by mouth daily at bedtime 90 tablet 2   • semaglutide (Rybelsus) 3 MG tablet Take 1 tablet (3 mg total) by mouth daily  "Take on an empty stomach with 4 oz water, and wait 30 minutes before eating 30 tablet 0   • [START ON 4/17/2024] semaglutide (Rybelsus) 7 MG tablet Take 1 tablet (7 mg total) by mouth daily Take on an empty stomach with 4 oz water, and wait 30 minutes before eating Do not start before April 17, 2024. 30 tablet 0   • tamsulosin (FLOMAX) 0.4 mg Take 1 capsule (0.4 mg total) by mouth daily with dinner Rx per Uro     • cholecalciferol (VITAMIN D3) 25 mcg (1,000 units) tablet Take 1,000 Units by mouth daily (Patient not taking: Reported on 10/27/2023)       No current facility-administered medications for this visit.       Objective:  /78   Pulse 70   Temp 99 °F (37.2 °C)   Resp 18   Ht 5' 9\" (1.753 m)   Wt 133 kg (292 lb 6.4 oz)   SpO2 94%   BMI 43.18 kg/m²    Wt Readings from Last 3 Encounters:   03/18/24 133 kg (292 lb 6.4 oz)   01/11/24 133 kg (293 lb)   12/21/23 133 kg (293 lb)      BP Readings from Last 3 Encounters:   03/18/24 112/78   01/11/24 120/80   12/21/23 120/80          Physical Exam  Vitals and nursing note reviewed.   Constitutional:       Appearance: Normal appearance. He is well-developed. He is obese.   HENT:      Head: Normocephalic and atraumatic.   Eyes:      Conjunctiva/sclera: Conjunctivae normal.   Neck:      Thyroid: No thyromegaly.      Vascular: No carotid bruit.   Cardiovascular:      Rate and Rhythm: Normal rate and regular rhythm.      Pulses: Normal pulses.      Heart sounds: Normal heart sounds.   Pulmonary:      Effort: Pulmonary effort is normal.      Breath sounds: Normal breath sounds.   Abdominal:      General: Bowel sounds are normal. There is no distension.      Palpations: Abdomen is soft. There is no mass.      Tenderness: There is no abdominal tenderness. There is no right CVA tenderness, left CVA tenderness, guarding or rebound.   Musculoskeletal:         General: No swelling or tenderness.      Cervical back: Neck supple.      Right lower leg: No edema.      Left " "lower leg: No edema.   Lymphadenopathy:      Cervical: No cervical adenopathy.   Neurological:      General: No focal deficit present.      Mental Status: He is alert and oriented to person, place, and time.   Psychiatric:         Mood and Affect: Mood normal.         Behavior: Behavior normal.         Thought Content: Thought content normal.         Judgment: Judgment normal.         Lab Results:      Lab Results   Component Value Date    WBC 5.39 10/09/2023    HGB 14.5 10/09/2023    HCT 47.5 10/09/2023     10/09/2023    TRIG 165 (H) 05/15/2023    HDL 37 (L) 05/15/2023    LDLDIRECT 179 (H) 02/20/2024    ALT 28 02/20/2024    AST 23 02/20/2024    K 4.3 02/20/2024     02/20/2024    CREATININE 1.35 (H) 02/20/2024    BUN 19 02/20/2024    CO2 26 02/20/2024    PSA 0.7 05/15/2023    GLUF 92 02/20/2024    HGBA1C 6.0 (H) 02/20/2024     No results found for: \"URICACID\"  Invalid input(s): \"BASENAME\" Vitamin D    No results found.     POCT Labs      BMI Counseling: Body mass index is 43.18 kg/m². The BMI is above normal. Exercise recommendations include exercising 3-5 times per week.     Depression Screening and Follow-up Plan: Patient was screened for depression during today's encounter. They screened negative with a PHQ-9 score of 3.                      BMI Counseling: Body mass index is 43.18 kg/m². The BMI is above normal. Nutrition recommendations include 3-5 servings of fruits/vegetables daily. Exercise recommendations include exercising 3-5 times per week.  "

## 2024-03-18 NOTE — PROGRESS NOTES
Assessment and Plan:     Problem List Items Addressed This Visit        Cardiovascular and Mediastinum    Essential hypertension     Stable.  Check blood pressure outside of office.  Recommend lifestyle modifications.         Relevant Orders    CBC and differential    Comprehensive metabolic panel       Respiratory    JANEY (obstructive sleep apnea)     Patient intolerant of CPAP.  He is interested in [employer].              Digestive    Gastroesophageal reflux disease     Stable. Continue Pepcid 20mg BID PRN.  Watch GERD diet.  To consider GI referral for EGD if uncontrolled?              Endocrine    Type 2 diabetes mellitus, without long-term current use of insulin ([facility])       Lab Results   Component Value Date    HGBA1C 6.0 (H) 02/20/2024     Stable.  Continue Metformin.  Start Rybelsus 7mg QD.  Patient declines DM education 9/12/23.  Recommend lifestyle modifications.    Goal blood sugars:  Fasting in AM - Less than 100. 2 hours after any meal - Less than 140.          Relevant Medications    semaglutide (Rybelsus) 3 MG tablet    semaglutide (Rybelsus) 7 MG tablet (Start on 4/17/2024)    atorvastatin (LIPITOR) 20 mg tablet    Continuous Blood Gluc Sensor (PicocentStyle Thai 3 Sensor) MISC    Other Relevant Orders    Comprehensive metabolic panel    Comprehensive metabolic panel    Comprehensive metabolic panel    Hemoglobin A1C       Musculoskeletal and Integument    Primary osteoarthritis of both knees     Management per Ortho.      You may use Tylenol (Acetaminophen) up to 3,000mg daily (in 24 hours) as needed for pain or fever.            Genitourinary    BPH without obstruction/lower urinary tract symptoms     Management per Uro.  Continue Flomax 0.4mg QHS.           Relevant Medications    tamsulosin (FLOMAX) 0.4 mg       Behavioral Health    Current mild episode of major depressive disorder without prior episode (HCC)     Stable s meds and counseling.           Relevant Orders    TSH, 3rd generation with Free  T4 reflex       Neurology/Sleep    RLS (restless legs syndrome)     Stable on Requip 1 mg nightly.            Other    Class 3 severe obesity due to excess calories with serious comorbidity and body mass index (BMI) of 40.0 to 44.9 in adult ([facility])     Stable.  Recommend lifestyle modifications.  Patient previously on Ozempic, but D/C due to cost.  To consider Weight Management referral PRN?         Relevant Orders    TSH, 3rd generation with Free T4 reflex    Mixed hyperlipidemia     LDL not at goal for DM2.  D/C Zocor 40mg QHS.  Start Lipitor 20mg QHS.  Recommend lifestyle modifications.         Relevant Medications    atorvastatin (LIPITOR) 20 mg tablet    Other Relevant Orders    Lipid panel    TSH, 3rd generation with Free T4 reflex    LDL cholesterol, direct    Seasonal allergies     Stable on Claritin 10 mg daily.         History of atrial fibrillation     Management per Cardio.  Stable s/p ablation.        Other Visit Diagnoses     Medicare annual wellness visit, subsequent    -  Primary    Morbid obesity with BMI of 40.0-44.9, adult ([facility])        Encounter for immunization               Preventive health issues were discussed with patient, and age appropriate screening tests were ordered as noted in patient's After Visit Summary.  Personalized health advice and appropriate referrals for health education or preventive services given if needed, as noted in patient's After Visit Summary.     History of Present Illness:     Patient presents for a Medicare Wellness Visit    HPI   Patient Care Team:  Britney Monson DO as PCP - General (Family Medicine)  Mauricio Merino MD as PCP - PCP-[facility] ([place])     Review of Systems:     Review of Systems    See other note.         Problem List:     Patient Active Problem List   Diagnosis   • Essential hypertension   • Current mild episode of major depressive disorder without prior episode ([facility])   • Class 3 severe obesity due to excess calories with serious  comorbidity and body mass index (BMI) of 40.0 to 44.9 in adult ([facility])   • Gastroesophageal reflux disease   • Complex tear of medial meniscus of right knee as current injury   • MURRAY (dyspnea on exertion)   • Mixed hyperlipidemia   • Primary osteoarthritis of both knees   • JANEY (obstructive sleep apnea)   • Seasonal allergies   • Type 2 diabetes mellitus, without long-term current use of insulin ([facility])   • History of atrial fibrillation   • RLS (restless legs syndrome)   • Disorder of patellofemoral joint   • Other tear of medial meniscus, current injury, left knee, initial encounter   • BPH without obstruction/lower urinary tract symptoms   • Status post arthroscopic partial medial meniscectomy of left knee      Past Medical and Surgical History:     Past Medical History:   Diagnosis Date   • Chronic kidney disease 04/20   • Depression 04/20   • GERD (gastroesophageal reflux disease)    • History of atrial fibrillation     s/p ablation   • Hyperlipidemia     ldl 123 from 179 from 138, -. zocor    • Hypertension    • Irregular heart beat     Afib   • Morbid obesity ([facility])    • Nocturia    • Osteoarthritis    • Restless legs    • Seasonal allergies    • Sleep apnea     no cpap   • Type 2 diabetes mellitus, without long-term current use of insulin ([facility]) 09/11/2023   • Visual impairment 1970    Not wearing glasses     Past Surgical History:   Procedure Laterality Date   • CARDIAC ELECTROPHYSIOLOGY MAPPING AND ABLATION  2019   • CARDIAC SURGERY      Ablation   • COLONOSCOPY  01/01/2012    no polyps   • COLONOSCOPY     • PALATE / UVULA BIOPSY / EXCISION      JANEY   • TX ARTHROSCOPY KNEE W/MENISCUS RPR MEDIAL&LATERAL Left 10/17/2023    Procedure: ARTHROSCOPY KNEE PARTIAL MEDIAL MENISECTOMY CHONDROPLASTY;  Surgeon: Xavi Johnson DO;  [facility];  Service: Orthopedics   • TX ARTHRS KNE SURG W/MENISCECTOMY MED/LAT W/SHVG Right 08/05/2021    Procedure: KNEE ARTHROSCOPIC PARTIAL MEDIAL MENISCECTOMY,  CHONDROPLASTY;  Surgeon: Sarabjit Gómez MD;  Location[facility] MA[facility] OR;  Service: Orthopedics   • SHOULDER SURGERY Bilateral     s/p 4 surgery - 2 on each side.      Family History:     Family History   Problem Relation Age of Onset   • Ovarian cancer Mother 68   • Coronary artery disease Father 35   • Heart attack Father 35   • Diabetes Sister    • Obesity Sister    • Hypertension Sister    • No Known Problems Sister    • Pancreatic cancer Brother    • Heart attack Brother 30   • Coronary artery disease Brother 30   • Heart attack Brother 41   • Coronary artery disease Brother 41   • Coronary artery disease Brother 40   • Obesity Brother    • Obesity Brother    • Diabetes Brother    • Cardiomyopathy Brother 62        Type unknown, has Pacemaker   • No Known Problems Brother    • No Known Problems Son    • No Known Problems Son       Social History:     Social History     Socioeconomic History   • Marital status: /Civil Union     Spouse name: None   • Number of children: 2   • Years of education: None   • Highest education level: None   Occupational History   • Occupation: Retired -  / Photo    Tobacco Use   • Smoking status: Never   • Smokeless tobacco: Never   Vaping Use   • Vaping status: Never Used   Substance and Sexual Activity   • Alcohol use: Yes     Alcohol/week: 2.0 standard drinks of alcohol     Types: 2 Standard drinks or equivalent per week   • Drug use: Never   • Sexual activity: Not Currently     Partners: Female   Other Topics Concern   • None   Social History Narrative        Lives with wife and grandson    2 Children - 2 Sons    Retired -  / Photo      Social Determinants of Health     Financial Resource Strain: Low Risk  (2/22/2024)    Overall Financial Resource Strain (CARDIA)    • Difficulty of Paying Living Expenses: Not very hard   Food Insecurity: No Food Insecurity (3/17/2024)    Hunger Vital Sign    • Worried About Running Out of Food  in the Last Year: Never true    • Ran Out of Food in the Last Year: Never true   Transportation Needs: No Transportation Needs (3/17/2024)    PRAPARE - Transportation    • Lack of Transportation (Medical): No    • Lack of Transportation (Non-Medical): No   Physical Activity: Inactive (5/13/2021)    Exercise Vital Sign    • Days of Exercise per Week: 0 days    • Minutes of Exercise per Session: 0 min   Stress: No Stress Concern Present (5/13/2021)    [employer] - Occupational Stress Questionnaire    • Feeling of Stress : Not at all   Social Connections: Not on file   Intimate Partner Violence: Not on file   Housing Stability: High Risk (3/17/2024)    Housing Stability Vital Sign    • Unable to Pay for Housing in the Last Year: Yes    • Number of Places Lived in the Last Year: 1    • Unstable Housing in the Last Year: No      Medications and Allergies:     Current Outpatient Medications   Medication Sig Dispense Refill   • Accu-Chek Guide test strip      • Accu-Chek Softclix Lancets lancets      • atorvastatin (LIPITOR) 20 mg tablet Take 1 tablet (20 mg total) by mouth every evening 30 tablet 8   • Blood Glucose Monitoring Suppl (Accu-Chek Guide Me) w/Device KIT TEST ONCE DAILY AS DIRECTED 100 kit 0   • Continuous Blood Gluc Sensor (FreeStyle Thai 3 Sensor) [facility] Use 1 each every 14 (fourteen) days 6 each 3   • famotidine (PEPCID) 20 mg tablet TAKE 1 TABLET BY MOUTH TWICE A  tablet 1   • lisinopril (ZESTRIL) 20 mg tablet Take 1 tablet (20 mg total) by mouth daily 90 tablet 2   • loratadine (CLARITIN) 10 mg tablet TAKE 1 TABLET BY MOUTH EVERY DAY (Patient taking differently: Take 10 mg by mouth daily as needed for allergies) 90 tablet 1   • metFORMIN (GLUCOPHAGE) 850 mg tablet TAKE 1 TABLET BY MOUTH 2 TIMES A DAY WITH MEALS. 180 tablet 1   • metoprolol tartrate (LOPRESSOR) 50 mg tablet Take 1 tablet (50 mg total) by mouth 2 (two) times a day 180 tablet 2   • Multiple Vitamin  (multivitamin) capsule Take 1 capsule by mouth daily     • rOPINIRole (REQUIP) 1 mg tablet Take 1 tablet (1 mg total) by mouth daily at bedtime 90 tablet 2   • semaglutide (Rybelsus) 3 MG tablet Take 1 tablet (3 mg total) by mouth daily Take on an empty stomach with 4 oz water, and wait 30 minutes before eating 30 tablet 0   • [START ON 4/17/2024] semaglutide (Rybelsus) 7 MG tablet Take 1 tablet (7 mg total) by mouth daily Take on an empty stomach with 4 oz water, and wait 30 minutes before eating Do not start before April 17, 2024. 30 tablet 0   • tamsulosin (FLOMAX) 0.4 mg Take 1 capsule (0.4 mg total) by mouth daily with dinner Rx per Uro     • cholecalciferol (VITAMIN D3) 25 mcg (1,000 units) tablet Take 1,000 Units by mouth daily (Patient not taking: Reported on 10/27/2023)       No current facility-administered medications for this visit.     No Known Allergies   Immunizations:     Immunization History   Administered Date(s) Administered   • COVID-19 PFIZER VACCINE 0.3 ML IM 03/17/2021, 04/12/2021, 12/20/2021   • Pneumococcal Conjugate Vaccine 20-valent (Pcv20), Polysace 09/12/2023      Health Maintenance:         Topic Date Due   • Colorectal Cancer Screening  03/02/2026   • HIV Screening  Completed   • Hepatitis C Screening  Completed     There are no preventive care reminders to display for this patient.     Medicare Screening Tests and Risk Assessments:     Tobi is here for his Subsequent Wellness visit.     Health Risk Assessment:   Patient rates overall health as fair. Patient feels that their physical health rating is slightly worse. Patient is very satisfied with their life. Eyesight was rated as slightly worse. Hearing was rated as slightly worse. Patient feels that their emotional and mental health rating is slightly better. Patients states they are sometimes angry. Patient states they are often unusually tired/fatigued. Pain experienced in the last 7 days has been some. Patient's pain rating has  been 6/10. Patient states that he has experienced weight loss or gain in last 6 months.     Depression Screening:   PHQ-9 Score: 3      Fall Risk Screening:   In the past year, patient has experienced: no history of falling in past year      Home Safety:  Patient has trouble with stairs inside or outside of their home. Patient has working smoke alarms and has no working carbon monoxide detector. Home safety hazards include: none. Declines PT consult    Nutrition:   Current diet is Regular.     Medications:   Patient is currently taking over-the-counter supplements. OTC medications include: see medication list. Patient is able to manage medications.     Activities of Daily Living (ADLs)/Instrumental Activities of Daily Living (IADLs):   Walk and transfer into and out of bed and chair?: Yes  Dress and groom yourself?: Yes    Bathe or shower yourself?: Yes    Feed yourself? Yes  Do your laundry/housekeeping?: Yes  Manage your money, pay your bills and track your expenses?: Yes  Make your own meals?: Yes    Do your own shopping?: Yes    Previous Hospitalizations:   Any hospitalizations or ED visits within the last 12 months?: Yes    How many hospitalizations have you had in the last year?: 1-2    Advance Care Planning:   Living will: No    Durable POA for healthcare: No    Advanced directive: No      Cognitive Screening:   Provider or family/friend/caregiver concerned regarding cognition?: No    PREVENTIVE SCREENINGS      Cardiovascular Screening:    General: Screening Not Indicated and History Lipid Disorder      Diabetes Screening:     General: Screening Not Indicated and History Diabetes      Colorectal Cancer Screening:     General: Screening Current      Prostate Cancer Screening:    General: Screening Current      Osteoporosis Screening:    General: Screening Not Indicated      Abdominal Aortic Aneurysm (AAA) Screening:        General: Screening Not Indicated      Lung Cancer Screening:     General: Screening Not  "Indicated      Hepatitis C Screening:    General: Screening Current    Screening, Brief Intervention, and Referral to Treatment (SBIRT)    Screening  Typical number of drinks in a day: 0  Typical number of drinks in a week: 0  Interpretation: Low risk drinking behavior.    AUDIT-C Screenin) How often did you have a drink containing alcohol in the past year? monthly or less  2) How many drinks did you have on a typical day when you were drinking in the past year? 0  3) How often did you have 6 or more drinks on one occasion in the past year? never    AUDIT-C Score: 1  Interpretation: Score 0-3 (male): Negative screen for alcohol misuse    Single Item Drug Screening:  How often have you used an illegal drug (including marijuana) or a prescription medication for non-medical reasons in the past year? never    Single Item Drug Screen Score: 0  Interpretation: Negative screen for possible drug use disorder    Brief Intervention  Alcohol & drug use screenings were reviewed. No concerns regarding substance use disorder identified.     Time Spent  Time spent screening/evaluating the patient for alcohol misuse: 5 minutes.     Other Counseling Topics:   Calcium and vitamin D intake and regular weightbearing exercise.     No results found.     Physical Exam:     /78   Pulse 70   Temp 99 °F (37.2 °C)   Resp 18   Ht 5' 9\" (1.753 m)   Wt 133 kg (292 lb 6.4 oz)   SpO2 94%   BMI 43.18 kg/m²     Physical Exam     See other note.        Britney Monson, DO  "

## 2024-03-18 NOTE — PATIENT INSTRUCTIONS
[facility]ise [facility] med sinus rinse kit, Mucinex, Claritin/Zyrtec/Allegra/Xyzal, Flonase / Nasacort nasal spray.  Avoid decongestants if you have high blood pressure.      Medicare Preventive Visit Patient Instructions  Thank you for completing your Welcome to Medicare Visit or Medicare Annual Wellness Visit today. Your next wellness visit will be due in one year (3/19/2025).  The screening/preventive services that you may require over the next 5-10 years are detailed below. Some tests may not apply to you based off risk factors and/or age. Screening tests ordered at today's visit but not completed yet may show as past due. Also, please note that scanned in results may not display below.  Preventive Screenings:  Service Recommendations Previous Testing/Comments   Colorectal Cancer Screening  Colonoscopy    Fecal Occult Blood Test (FOBT)/Fecal Immunochemical Test (FIT)  Fecal DNA/Cologuard Test  Flexible Sigmoidoscopy Age: 45-75 years old   Colonoscopy: every 10 years (May be performed more frequently if at higher risk)  OR  FOBT/FIT: every 1 year  OR  Cologuard: every 3 years  OR  Sigmoidoscopy: every 5 years  Screening may be recommended earlier than age 45 if at higher risk for colorectal cancer. Also, an individualized decision between you and your healthcare provider will decide whether screening between the ages of 76-85 would be appropriate. Colonoscopy: Not on file  FOBT/FIT: Not on file  Cologuard: 03/02/2023  Sigmoidoscopy: Not on file    Screening Current     Prostate Cancer Screening Individualized decision between patient and health care provider in men between ages of 55-69   Medicare will cover every 12 months beginning on the day after your 50th birthday PSA: 0.7 ng/mL     Screening Current     Hepatitis C Screening Once for adults born between 1945 and 1965  More frequently in patients at high risk for Hepatitis C Hep C Antibody: 10/09/2023    Screening Current   Diabetes Screening 1-2 times per year if  you're at risk for diabetes or have pre-diabetes Fasting glucose: 92 mg/dL (2/20/2024)  A1C: 6.0 % (2/20/2024)  Screening Not Indicated  History Diabetes   Cholesterol Screening Once every 5 years if you don't have a lipid disorder. May order more often based on risk factors. Lipid panel: 05/15/2023  Screening Not Indicated  History Lipid Disorder      Other Preventive Screenings Covered by Medicare:  Abdominal Aortic Aneurysm (AAA) Screening: covered once if your at risk. You're considered to be at risk if you have a family history of AAA or a male between the age of 65-75 who smoking at least 100 cigarettes in your lifetime.  Lung Cancer Screening: covers low dose CT scan once per year if you meet all of the following conditions: (1) Age 55-77; (2) No signs or symptoms of lung cancer; (3) Current smoker or have quit smoking within the last 15 years; (4) You have a tobacco smoking history of at least 20 pack years (packs per day x number of years you smoked); (5) You get a written order from a healthcare provider.  Glaucoma Screening: covered annually if you're considered high risk: (1) You have diabetes OR (2) Family history of glaucoma OR (3)  aged 50 and older OR (4)  American aged 65 and older  Osteoporosis Screening: covered every 2 years if you meet one of the following conditions: (1) Have a vertebral abnormality; (2) On glucocorticoid therapy for more than 3 months; (3) Have primary hyperparathyroidism; (4) On osteoporosis medications and need to assess response to drug therapy.  HIV Screening: covered annually if you're between the age of 15-65. Also covered annually if you are younger than 15 and older than 65 with risk factors for HIV infection. For pregnant patients, it is covered up to 3 times per pregnancy.    Immunizations:  Immunization Recommendations   Influenza Vaccine Annual influenza vaccination during flu season is recommended for all persons aged >= 6 months who do not  have contraindications   Pneumococcal Vaccine   * Pneumococcal conjugate vaccine = PCV13 (Prevnar 13), PCV15 (Vaxneuvance), PCV20 (Prevnar 20)  * Pneumococcal polysaccharide vaccine = PPSV23 (Pneumovax) Adults 19-65 yo with certain risk factors or if 65+ yo  If never received any pneumonia vaccine: recommend Prevnar 20 (PCV20)  Give PCV20 if previously received 1 dose of PCV13 or PPSV23   Hepatitis B Vaccine 3 dose series if at intermediate or high risk (ex: diabetes, end stage renal disease, liver disease)   Respiratory syncytial virus (RSV) Vaccine - COVERED BY MEDICARE PART D  * RSVPreF3 (Arexvy) CDC recommends that adults 60 years of age and older may receive a single dose of RSV vaccine using shared clinical decision-making (SCDM)   Tetanus (Td) Vaccine - COST NOT COVERED BY MEDICARE PART B Following completion of primary series, a booster dose should be given every 10 years to maintain immunity against tetanus. Td may also be given as tetanus wound prophylaxis.   Tdap Vaccine - COST NOT COVERED BY MEDICARE PART B Recommended at least once for all adults. For pregnant patients, recommended with each pregnancy.   Shingles Vaccine (Shingrix) - COST NOT COVERED BY MEDICARE PART B  2 shot series recommended in those 19 years and older who have or will have weakened immune systems or those 50 years and older     Health Maintenance Due:      Topic Date Due   • Colorectal Cancer Screening  03/02/2026   • HIV Screening  Completed   • Hepatitis C Screening  Completed     Immunizations Due:  There are no preventive care reminders to display for this patient.    Advance Directives   What are advance directives?  Advance directives are legal documents that state your wishes and plans for medical care. These plans are made ahead of time in case you lose your ability to make decisions for yourself. Advance directives can apply to any medical decision, such as the treatments you want, and if you want to donate organs.   What  are the types of advance directives?  There are many types of advance directives, and each state has rules about how to use them. You may choose a combination of any of the following:  Living will:  This is a written record of the treatment you want. You can also choose which treatments you do not want, which to limit, and which to stop at a certain time. This includes surgery, medicine, IV fluid, and tube feedings.   Durable power of  for healthcare (DPAHC):  This is a written record that states who you want to make healthcare choices for you when you are unable to make them for yourself. This person, called a proxy, is usually a family member or a friend. You may choose more than 1 proxy.  Do not resuscitate (DNR) order:  A DNR order is used in case your heart stops beating or you stop breathing. It is a request not to have certain forms of treatment, such as CPR. A DNR order may be included in other types of advance directives.  Medical directive:  This covers the care that you want if you are in a coma, near death, or unable to make decisions for yourself. You can list the treatments you want for each condition. Treatment may include pain medicine, surgery, blood transfusions, dialysis, IV or tube feedings, and a ventilator (breathing machine).  Values history:  This document has questions about your views, beliefs, and how you feel and think about life. This information can help others choose the care that you would choose.  Why are advance directives important?  An advance directive helps you control your care. Although spoken wishes may be used, it is better to have your wishes written down. Spoken wishes can be misunderstood, or not followed. Treatments may be given even if you do not want them. An advance directive may make it easier for your family to make difficult choices about your care.   Weight Management   Why it is important to manage your weight:  Being overweight increases your risk of  health conditions such as heart disease, high blood pressure, type 2 diabetes, and certain types of cancer. It can also increase your risk for osteoarthritis, sleep apnea, and other respiratory problems. Aim for a slow, steady weight loss. Even a small amount of weight loss can lower your risk of health problems.  How to lose weight safely:  A safe and healthy way to lose weight is to eat fewer calories and get regular exercise. You can lose up about 1 pound a week by decreasing the number of calories you eat by 500 calories each day.   Healthy meal plan for weight management:  A healthy meal plan includes a variety of foods, contains fewer calories, and helps you stay healthy. A healthy meal plan includes the following:  Eat whole-grain foods more often.  A healthy meal plan should contain fiber. Fiber is the part of grains, fruits, and vegetables that is not broken down by your body. Whole-grain foods are healthy and provide extra fiber in your diet. Some examples of whole-grain foods are whole-wheat breads and pastas, oatmeal, brown rice, and bulgur.  Eat a variety of vegetables every day.  Include dark, leafy greens such as spinach, kale, aron greens, and mustard greens. Eat yellow and orange vegetables such as carrots, sweet potatoes, and winter squash.   Eat a variety of fruits every day.  Choose fresh or canned fruit (canned in its own juice or light syrup) instead of juice. Fruit juice has very little or no fiber.  Eat low-fat dairy foods.  Drink fat-free (skim) milk or 1% milk. Eat fat-free yogurt and low-fat cottage cheese. Try low-fat cheeses such as mozzarella and other reduced-fat cheeses.  Choose meat and other protein foods that are low in fat.  Choose beans or other legumes such as split peas or lentils. Choose fish, skinless poultry (chicken or turkey), or lean cuts of red meat (beef or pork). Before you cook meat or poultry, cut off any visible fat.   Use less fat and oil.  Try baking foods  instead of frying them. Add less fat, such as margarine, sour cream, regular salad dressing and mayonnaise to foods. Eat fewer high-fat foods. Some examples of high-fat foods include french fries, doughnuts, ice cream, and cakes.  Eat fewer sweets.  Limit foods and drinks that are high in sugar. This includes candy, cookies, regular soda, and sweetened drinks.  Exercise:  Exercise at least 30 minutes per day on most days of the week. Some examples of exercise include walking, biking, dancing, and swimming. You can also fit in more physical activity by taking the stairs instead of the elevator or parking farther away from stores. Ask your healthcare provider about the best exercise plan for you.      © Copyright [employer] 2018 Information is for End User's use only and may not be sold, redistributed or otherwise used for commercial purposes. All illustrations and images included in CareNotes® are the copyrighted property of [employer]. or [employer]    Obesity   AMBULATORY CARE:   Obesity  means your body mass index (BMI) is greater than 30. Your healthcare provider will use your age, height, and weight to measure your BMI.  The risks of obesity include  many health problems, including injuries or physical disability.  Diabetes (high blood sugar level)    High blood pressure or high cholesterol    Heart disease or heart failure    Stroke    Gallbladder or liver disease    Cancer of the colon, breast, prostate, liver, or kidney    Sleep apnea    Arthritis or gout    Screening  is done to check for health conditions before you have signs or symptoms. If you are 35 to 70 years old, your blood sugar level may be checked every 3 years for signs of prediabetes or diabetes. Your healthcare provider will check your blood pressure at each visit. High blood pressure can lead to a stroke or other problems. Your provider may check for signs of heart disease, cancer, or other health problems.  Seek care  immediately if:   You have a severe headache, confusion, or difficulty speaking.    You have weakness on one side of your body.    You have chest pain, sweating, or shortness of breath.    Call your doctor if:   You have symptoms of gallbladder or liver disease, such as pain in your upper abdomen.    You have knee or hip pain and discomfort while walking.    You have symptoms of diabetes, such as intense hunger and thirst, and frequent urination.    You have symptoms of sleep apnea, such as snoring or daytime sleepiness.    You have questions or concerns about your condition or care.    Treatment for obesity  focuses on helping you lose weight to improve your health. Even a small decrease in BMI can reduce the risk for many health problems. Your healthcare provider will help you set a weight-loss goal.  Lifestyle changes  are the first step in treating obesity. These include making healthy food choices and getting regular physical activity. Your healthcare provider may suggest a weight-loss program that involves coaching, education, and therapy.    Medicine  may help you lose weight when it is used with a healthy foods and physical activity.    Surgery  can help you lose weight if you have obesity along with other health problems. Several types of weight-loss surgery are available. Ask your healthcare provider for more information.    Tips for safe weight loss:   Set small, realistic goals.  An example of a small goal is to walk for 20 minutes 5 days a week. Anther goal is to lose 5% of your body weight.    Ask for support.  Tell friends, family members, and coworkers about your goals. Ask someone to lose weight with you. You may also want to join a weight-loss support group.    Identify foods or triggers that may cause you to overeat.  Remove tempting high-calorie foods from your home and workplace. Place a bowl of fresh fruit on your kitchen counter. If stress causes you to eat, find other ways to cope with  stress. A counselor or therapist may be able to help you.    Track your daily calories and activity.  Write down what you eat and drink. Also write down how many minutes of physical activity you do each day.     Track your weekly weight.  Weigh yourself in the morning, before you eat or drink anything but after you use the bathroom. Use the same scale, in the same place, and in similar clothing each time. Only weigh yourself 1 to 2 times each week, or as directed. You may become discouraged if you weigh yourself every day.    Eating changes:  You will need to eat 500 to 1,000 fewer calories each day than you currently eat to lose 1 to 2 pounds a week. The following changes will help you cut calories:  Eat smaller portions.  Use small plates, no larger than 9 inches in diameter. Fill your plate half full of fruits and vegetables. Measure your food using measuring cups until you know what a serving size looks like.         Eat 3 meals and 1 or 2 snacks each day.  Plan your meals in advance. Cook and eat at home most of the time. Eat slowly. Do not skip meals. Skipping meals can lead to overeating later in the day. This can make it harder for you to lose weight. Talk with a dietitian to help you make a meal plan and schedule that is right for you.    Eat fruits and vegetables at every meal.  They are low in calories and high in fiber, which makes you feel full. Do not add butter, margarine, or cream sauce to vegetables. Use herbs to season steamed vegetables.    Eat less fat and fewer fried foods.  Eat more baked or grilled chicken and fish. These protein sources are lower in calories and fat than red meat. Limit fast food. Dress your salads with olive oil and vinegar instead of bottled dressing.    Limit the amount of sugar you eat.  Do not drink sugary beverages. Limit alcohol.       Activity changes:  Physical activity is good for your body in many ways. It helps you burn calories and build strong muscles. It  decreases stress and depression, and improves your mood. It can also help you sleep better. Talk to your healthcare provider before you begin an exercise program.  Exercise for at least 30 minutes 5 days a week.  Start slowly. Set aside time each day for physical activity that you enjoy and that is convenient for you. It is best to do both weight training and an activity that increases your heart rate, such as walking, bicycling, or swimming.            Find ways to be more active.  Do yard work and housecleaning. Walk up the stairs instead of using elevators. Spend your leisure time going to events that require walking, such as outdoor festivals or fairs. This extra physical activity can help you lose weight and keep it off.       Follow up with your doctor as directed:  You may need to meet with a dietitian. Write down your questions so you remember to ask them during your visits.  © Copyright [employer] 2023 Information is for End User's use only and may not be sold, redistributed or otherwise used for commercial purposes.  The above information is an  only. It is not intended as medical advice for individual conditions or treatments. Talk to your doctor, nurse or pharmacist before following any medical regimen to see if it is safe and effective for you.    Medicare Preventive Visit Patient Instructions  Thank you for completing your Welcome to Medicare Visit or Medicare Annual Wellness Visit today. Your next wellness visit will be due in one year (3/19/2025).  The screening/preventive services that you may require over the next 5-10 years are detailed below. Some tests may not apply to you based off risk factors and/or age. Screening tests ordered at today's visit but not completed yet may show as past due. Also, please note that scanned in results may not display below.  Preventive Screenings:  Service Recommendations Previous Testing/Comments   Colorectal Cancer Screening  Colonoscopy    Fecal  Occult Blood Test (FOBT)/Fecal Immunochemical Test (FIT)  Fecal DNA/Cologuard Test  Flexible Sigmoidoscopy Age: 45-75 years old   Colonoscopy: every 10 years (May be performed more frequently if at higher risk)  OR  FOBT/FIT: every 1 year  OR  Cologuard: every 3 years  OR  Sigmoidoscopy: every 5 years  Screening may be recommended earlier than age 45 if at higher risk for colorectal cancer. Also, an individualized decision between you and your healthcare provider will decide whether screening between the ages of 76-85 would be appropriate. Colonoscopy: Not on file  FOBT/FIT: Not on file  Cologuard: 03/02/2023  Sigmoidoscopy: Not on file    Screening Current     Prostate Cancer Screening Individualized decision between patient and health care provider in men between ages of 55-69   Medicare will cover every 12 months beginning on the day after your 50th birthday PSA: 0.7 ng/mL     Screening Current     Hepatitis C Screening Once for adults born between 1945 and 1965  More frequently in patients at high risk for Hepatitis C Hep C Antibody: 10/09/2023    Screening Current   Diabetes Screening 1-2 times per year if you're at risk for diabetes or have pre-diabetes Fasting glucose: 92 mg/dL (2/20/2024)  A1C: 6.0 % (2/20/2024)  Screening Not Indicated  History Diabetes   Cholesterol Screening Once every 5 years if you don't have a lipid disorder. May order more often based on risk factors. Lipid panel: 05/15/2023  Screening Not Indicated  History Lipid Disorder      Other Preventive Screenings Covered by Medicare:  Abdominal Aortic Aneurysm (AAA) Screening: covered once if your at risk. You're considered to be at risk if you have a family history of AAA or a male between the age of 65-75 who smoking at least 100 cigarettes in your lifetime.  Lung Cancer Screening: covers low dose CT scan once per year if you meet all of the following conditions: (1) Age 55-77; (2) No signs or symptoms of lung cancer; (3) Current smoker or  have quit smoking within the last 15 years; (4) You have a tobacco smoking history of at least 20 pack years (packs per day x number of years you smoked); (5) You get a written order from a healthcare provider.  Glaucoma Screening: covered annually if you're considered high risk: (1) You have diabetes OR (2) Family history of glaucoma OR (3)  aged 50 and older OR (4)  American aged 65 and older  Osteoporosis Screening: covered every 2 years if you meet one of the following conditions: (1) Have a vertebral abnormality; (2) On glucocorticoid therapy for more than 3 months; (3) Have primary hyperparathyroidism; (4) On osteoporosis medications and need to assess response to drug therapy.  HIV Screening: covered annually if you're between the age of 15-65. Also covered annually if you are younger than 15 and older than 65 with risk factors for HIV infection. For pregnant patients, it is covered up to 3 times per pregnancy.    Immunizations:  Immunization Recommendations   Influenza Vaccine Annual influenza vaccination during flu season is recommended for all persons aged >= 6 months who do not have contraindications   Pneumococcal Vaccine   * Pneumococcal conjugate vaccine = PCV13 (Prevnar 13), PCV15 (Vaxneuvance), PCV20 (Prevnar 20)  * Pneumococcal polysaccharide vaccine = PPSV23 (Pneumovax) Adults 19-63 yo with certain risk factors or if 65+ yo  If never received any pneumonia vaccine: recommend Prevnar 20 (PCV20)  Give PCV20 if previously received 1 dose of PCV13 or PPSV23   Hepatitis B Vaccine 3 dose series if at intermediate or high risk (ex: diabetes, end stage renal disease, liver disease)   Respiratory syncytial virus (RSV) Vaccine - COVERED BY MEDICARE PART D  * RSVPreF3 (Arexvy) CDC recommends that adults 60 years of age and older may receive a single dose of RSV vaccine using shared clinical decision-making (SCDM)   Tetanus (Td) Vaccine - COST NOT COVERED BY MEDICARE PART B Following  completion of primary series, a booster dose should be given every 10 years to maintain immunity against tetanus. Td may also be given as tetanus wound prophylaxis.   Tdap Vaccine - COST NOT COVERED BY MEDICARE PART B Recommended at least once for all adults. For pregnant patients, recommended with each pregnancy.   Shingles Vaccine (Shingrix) - COST NOT COVERED BY MEDICARE PART B  2 shot series recommended in those 19 years and older who have or will have weakened immune systems or those 50 years and older     Health Maintenance Due:      Topic Date Due   • Colorectal Cancer Screening  03/02/2026   • HIV Screening  Completed   • Hepatitis C Screening  Completed     Immunizations Due:  There are no preventive care reminders to display for this patient.  Advance Directives   What are advance directives?  Advance directives are legal documents that state your wishes and plans for medical care. These plans are made ahead of time in case you lose your ability to make decisions for yourself. Advance directives can apply to any medical decision, such as the treatments you want, and if you want to donate organs.   What are the types of advance directives?  There are many types of advance directives, and each state has rules about how to use them. You may choose a combination of any of the following:  Living will:  This is a written record of the treatment you want. You can also choose which treatments you do not want, which to limit, and which to stop at a certain time. This includes surgery, medicine, IV fluid, and tube feedings.   Durable power of  for healthcare (DPAHC):  This is a written record that states who you want to make healthcare choices for you when you are unable to make them for yourself. This person, called a proxy, is usually a family member or a friend. You may choose more than 1 proxy.  Do not resuscitate (DNR) order:  A DNR order is used in case your heart stops beating or you stop breathing.  It is a request not to have certain forms of treatment, such as CPR. A DNR order may be included in other types of advance directives.  Medical directive:  This covers the care that you want if you are in a coma, near death, or unable to make decisions for yourself. You can list the treatments you want for each condition. Treatment may include pain medicine, surgery, blood transfusions, dialysis, IV or tube feedings, and a ventilator (breathing machine).  Values history:  This document has questions about your views, beliefs, and how you feel and think about life. This information can help others choose the care that you would choose.  Why are advance directives important?  An advance directive helps you control your care. Although spoken wishes may be used, it is better to have your wishes written down. Spoken wishes can be misunderstood, or not followed. Treatments may be given even if you do not want them. An advance directive may make it easier for your family to make difficult choices about your care.   Weight Management   Why it is important to manage your weight:  Being overweight increases your risk of health conditions such as heart disease, high blood pressure, type 2 diabetes, and certain types of cancer. It can also increase your risk for osteoarthritis, sleep apnea, and other respiratory problems. Aim for a slow, steady weight loss. Even a small amount of weight loss can lower your risk of health problems.  How to lose weight safely:  A safe and healthy way to lose weight is to eat fewer calories and get regular exercise. You can lose up about 1 pound a week by decreasing the number of calories you eat by 500 calories each day.   Healthy meal plan for weight management:  A healthy meal plan includes a variety of foods, contains fewer calories, and helps you stay healthy. A healthy meal plan includes the following:  Eat whole-grain foods more often.  A healthy meal plan should contain fiber. Fiber is the  part of grains, fruits, and vegetables that is not broken down by your body. Whole-grain foods are healthy and provide extra fiber in your diet. Some examples of whole-grain foods are whole-wheat breads and pastas, oatmeal, brown rice, and bulgur.  Eat a variety of vegetables every day.  Include dark, leafy greens such as spinach, kale, aron greens, and mustard greens. Eat yellow and orange vegetables such as carrots, sweet potatoes, and winter squash.   Eat a variety of fruits every day.  Choose fresh or canned fruit (canned in its own juice or light syrup) instead of juice. Fruit juice has very little or no fiber.  Eat low-fat dairy foods.  Drink fat-free (skim) milk or 1% milk. Eat fat-free yogurt and low-fat cottage cheese. Try low-fat cheeses such as mozzarella and other reduced-fat cheeses.  Choose meat and other protein foods that are low in fat.  Choose beans or other legumes such as split peas or lentils. Choose fish, skinless poultry (chicken or turkey), or lean cuts of red meat (beef or pork). Before you cook meat or poultry, cut off any visible fat.   Use less fat and oil.  Try baking foods instead of frying them. Add less fat, such as margarine, sour cream, regular salad dressing and mayonnaise to foods. Eat fewer high-fat foods. Some examples of high-fat foods include french fries, doughnuts, ice cream, and cakes.  Eat fewer sweets.  Limit foods and drinks that are high in sugar. This includes candy, cookies, regular soda, and sweetened drinks.  Exercise:  Exercise at least 30 minutes per day on most days of the week. Some examples of exercise include walking, biking, dancing, and swimming. You can also fit in more physical activity by taking the stairs instead of the elevator or parking farther away from stores. Ask your healthcare provider about the best exercise plan for you.      © Copyright [employer] 2018 Information is for End User's use only and may not be sold, redistributed or  otherwise used for commercial purposes. All illustrations and images included in CareNotes® are the copyrighted property of [employer] Inc[employer] or [employer]

## 2024-03-23 NOTE — ASSESSMENT & PLAN NOTE
LDL not at goal for DM2.  D/C Zocor 40mg QHS.  Start Lipitor 20mg QHS.  Recommend lifestyle modifications.

## 2024-03-23 NOTE — ASSESSMENT & PLAN NOTE
Lab Results   Component Value Date    HGBA1C 6.0 (H) 02/20/2024     Stable.  Continue Metformin.  Start Rybelsus 7mg QD.  Patient declines DM education 9/12/23.  Recommend lifestyle modifications.    Goal blood sugars:  Fasting in AM - Less than 100. 2 hours after any meal - Less than 140.

## 2024-04-04 ENCOUNTER — OFFICE VISIT (OUTPATIENT)
Dept: SLEEP CENTER | Facility: CLINIC | Age: 64
End: 2024-04-04
Payer: COMMERCIAL

## 2024-04-04 VITALS
BODY MASS INDEX: 42.95 KG/M2 | WEIGHT: 290 LBS | DIASTOLIC BLOOD PRESSURE: 72 MMHG | SYSTOLIC BLOOD PRESSURE: 144 MMHG | HEART RATE: 65 BPM | HEIGHT: 69 IN

## 2024-04-04 DIAGNOSIS — G47.33 OSA (OBSTRUCTIVE SLEEP APNEA): ICD-10-CM

## 2024-04-04 DIAGNOSIS — G47.10 HYPERSOMNIA: Primary | ICD-10-CM

## 2024-04-04 DIAGNOSIS — E83.10 DISORDER OF IRON METABOLISM: ICD-10-CM

## 2024-04-04 DIAGNOSIS — G25.81 RESTLESS LEG SYNDROME: ICD-10-CM

## 2024-04-04 PROCEDURE — G2211 COMPLEX E/M VISIT ADD ON: HCPCS | Performed by: PSYCHIATRY & NEUROLOGY

## 2024-04-04 PROCEDURE — 99204 OFFICE O/P NEW MOD 45 MIN: CPT | Performed by: PSYCHIATRY & NEUROLOGY

## 2024-04-04 RX ORDER — BLOOD-GLUCOSE,RECEIVER,CONT
EACH MISCELLANEOUS
COMMUNITY
Start: 2024-03-19

## 2024-04-04 NOTE — PATIENT INSTRUCTIONS

## 2024-04-04 NOTE — PROGRESS NOTES
"Assessment/Plan:    1. Hypersomnia    2. JANEY (obstructive sleep apnea)  -     Split Study; Future    3. Restless leg syndrome  -     Iron Panel (Includes Ferritin, Iron Sat%, Iron, and TIBC); Future    4. Disorder of iron metabolism  -     Iron Panel (Includes Ferritin, Iron Sat%, Iron, and TIBC); Future        We discussed that certainly treating severe obstructive sleep apnea is important, his last test was almost 4 years ago so a repeat assessment is needed.  I recommend a split-night sleep study so we can reassess for obstructive sleep apnea and try to optimize CPAP treatment.  Unfortunately, he is not a candidate for a hypoglossal nerve stimulator/inspire device at the present time as his body mass index is above the range to qualify.  Many insurances require a BMI less than 35 although there are some that except a BMI less than 40.  In light of this, it would be best to focus on treatment with CPAP at the present time.  It is possible disease severity has worsened since that test a few years ago as his weight has increased.  He is open to use of CPAP, notes that he has observed a CPAP machine that is very quiet so he would have interest in this.    Certainly, weight loss is extremely important, we discussed that with significant weight loss sleep apnea can improve or resolve.    With regard to restless leg syndrome, I would recommend checking an iron panel to assess for iron deficiency as in restless legs we prefer ferritin above 75 ng/mL.  Long-term, ropinirole is not completely ideal as it confers risk for augmentation of restless leg symptoms.  He has not used gabapentin, that would be a consideration in the future.    Subjective:      Patient ID: Tobi Hoffmann is a 63 y.o. male.    HPI  Cc- Interest in Inspire  Symptoms of concern- stop breathing, gasping for air, struggle to catch breath, snoring.  Feels sleepy in the day, can't doze on the couch \"anytime\".      This is a 63-year-old male who " presents in initial consultation, referred by family medicine in May 2023.  Notes describe that the patient has been intolerant of CPAP and has interest in inspire.  He is also describes have a history of restless leg syndrome, had been prescribed ropinirole in the past.  A copy of a home sleep study was reviewed, test from March 2020.  That test describes severe obstructive sleep apnea with a respiratory event index of 42, lowest saturation 54%.  338 obstructive apneas were reported and 29 hypopneas.  Weight around that time of testing was 274 pounds.  I also reviewed a report of a CPAP titration in 2005, that report references a baseline AHI of 52 on a prior diagnostic test, that test is not available.  On the CPAP titration best results were seen at CPAP 14 cm H2O. Has also tried BiPAP in the past       On disability, lives with wife and grandchild (age 15)     Past medical history includes hypertension, history of depression, gastroesophageal reflux disease, type 2 diabetes, history of atrial fibrillation    He goes to bed around  12, a few nights a week it is hard to fall asleep.  He wakes at 10 to 11 AM, sleeps 9 or more hours a night. Wakes 3 times a night, hard to return to sleep.     He is rarely refreshed upon awakening and usually feels sleepy during the day.  Takes naps often.      He has loud snoring, breathing pauses, choking, gasping, and snorting in sleep.    He denies sleepwalking or dream enactment.    He describes symptoms of restless legs and leg kicks during sleep..  Takes ropinirole 1 mg at bedtime.  Does not have much RLS.  No dizziness, no compulsive gambling, no nausea, no hx low iron levels.  Sometimes on couch, ankles bother him and he has to keep twitching them, occurs 3-4 days a week.      Caffeine- soda , 3 cans a day, coke zero.  Stops at 8-10 pm  Alcohol- rare        Hulett Sleepiness Scale  Sitting and reading: Moderate chance of dozing  Watching TV: High chance of dozing  Sitting,  "inactive in a public place (e.g. a theatre or a meeting): Slight chance of dozing  As a passenger in a car for an hour without a break: Would never doze  Lying down to rest in the afternoon when circumstances permit: High chance of dozing  Sitting and talking to someone: Would never doze  Sitting quietly after a lunch without alcohol: Slight chance of dozing  In a car, while stopped for a few minutes in traffic: Would never doze  Total score: 10      Review of Systems  (as above unless noted)    Objective:      Visit Vitals  /72 (BP Location: Left arm, Patient Position: Sitting, Cuff Size: Large)   Pulse 65   Ht 5' 9\" (1.753 m)   Wt 132 kg (290 lb)   BMI 42.83 kg/m²   Smoking Status Never   BSA 2.42 m²             Physical Exam  Constitutional:       Appearance: Normal appearance.   HENT:      Head: Normocephalic and atraumatic.      Mouth/Throat:      Mouth: Mucous membranes are moist.   Eyes:      Extraocular Movements: Extraocular movements intact.      Pupils: Pupils are equal, round, and reactive to light.   Cardiovascular:      Rate and Rhythm: Normal rate.      Pulses: Normal pulses.      Heart sounds: Normal heart sounds.   Pulmonary:      Effort: Pulmonary effort is normal.      Breath sounds: Normal breath sounds.   Musculoskeletal:      Right lower leg: No edema.      Left lower leg: No edema.   Neurological:      Mental Status: He is alert.   Psychiatric:         Mood and Affect: Mood normal.         Behavior: Behavior normal.         Thought Content: Thought content normal.         Judgment: Judgment normal.           18 in neck circumference  Mallampati 4  S.p uppp, has scarring of the soft palate, does not have significant tonsillar tissue    Data reviewed, prior sleep test including outside sleep study from [facility].  CO2 level 26 mmol/L from February does not raise suspicion for obesity hypoventilation syndrome.  He has an elevated creatinine which seems stable over the past few " years.

## 2024-04-12 DIAGNOSIS — E11.9 TYPE 2 DIABETES MELLITUS WITHOUT COMPLICATION, WITHOUT LONG-TERM CURRENT USE OF INSULIN (HCC): ICD-10-CM

## 2024-04-12 RX ORDER — ATORVASTATIN CALCIUM 20 MG/1
20 TABLET, FILM COATED ORAL EVERY EVENING
Qty: 90 TABLET | Refills: 1 | Status: SHIPPED | OUTPATIENT
Start: 2024-04-12

## 2024-04-16 ENCOUNTER — APPOINTMENT (OUTPATIENT)
Dept: LAB | Age: 64
End: 2024-04-16
Payer: COMMERCIAL

## 2024-04-16 DIAGNOSIS — E11.9 TYPE 2 DIABETES MELLITUS WITHOUT COMPLICATION, WITHOUT LONG-TERM CURRENT USE OF INSULIN (HCC): ICD-10-CM

## 2024-04-16 DIAGNOSIS — E83.10 DISORDER OF IRON METABOLISM: ICD-10-CM

## 2024-04-16 DIAGNOSIS — E78.2 MIXED HYPERLIPIDEMIA: ICD-10-CM

## 2024-04-16 DIAGNOSIS — G25.81 RESTLESS LEG SYNDROME: ICD-10-CM

## 2024-04-16 DIAGNOSIS — I10 ESSENTIAL HYPERTENSION: ICD-10-CM

## 2024-04-16 DIAGNOSIS — F32.0 CURRENT MILD EPISODE OF MAJOR DEPRESSIVE DISORDER WITHOUT PRIOR EPISODE (HCC): ICD-10-CM

## 2024-04-16 DIAGNOSIS — E66.01 CLASS 3 SEVERE OBESITY DUE TO EXCESS CALORIES WITH SERIOUS COMORBIDITY AND BODY MASS INDEX (BMI) OF 40.0 TO 44.9 IN ADULT (HCC): ICD-10-CM

## 2024-04-16 LAB
ALBUMIN SERPL BCP-MCNC: 3.6 G/DL (ref 3.5–5)
ALP SERPL-CCNC: 51 U/L (ref 34–104)
ALT SERPL W P-5'-P-CCNC: 21 U/L (ref 7–52)
ANION GAP SERPL CALCULATED.3IONS-SCNC: 7 MMOL/L (ref 4–13)
AST SERPL W P-5'-P-CCNC: 23 U/L (ref 13–39)
BILIRUB SERPL-MCNC: 0.76 MG/DL (ref 0.2–1)
BUN SERPL-MCNC: 17 MG/DL (ref 5–25)
CALCIUM SERPL-MCNC: 9.1 MG/DL (ref 8.4–10.2)
CHLORIDE SERPL-SCNC: 103 MMOL/L (ref 96–108)
CO2 SERPL-SCNC: 28 MMOL/L (ref 21–32)
CREAT SERPL-MCNC: 1.25 MG/DL (ref 0.6–1.3)
FERRITIN SERPL-MCNC: 78 NG/ML (ref 24–336)
GFR SERPL CREATININE-BSD FRML MDRD: 60 ML/MIN/1.73SQ M
GLUCOSE P FAST SERPL-MCNC: 97 MG/DL (ref 65–99)
IRON SATN MFR SERPL: 29 % (ref 15–50)
IRON SERPL-MCNC: 92 UG/DL (ref 50–212)
POTASSIUM SERPL-SCNC: 4.3 MMOL/L (ref 3.5–5.3)
PROT SERPL-MCNC: 7.4 G/DL (ref 6.4–8.4)
SODIUM SERPL-SCNC: 138 MMOL/L (ref 135–147)
TIBC SERPL-MCNC: 322 UG/DL (ref 250–450)
UIBC SERPL-MCNC: 230 UG/DL (ref 155–355)

## 2024-04-16 PROCEDURE — 82728 ASSAY OF FERRITIN: CPT

## 2024-04-16 PROCEDURE — 83540 ASSAY OF IRON: CPT

## 2024-04-16 PROCEDURE — 83550 IRON BINDING TEST: CPT

## 2024-04-16 PROCEDURE — 80053 COMPREHEN METABOLIC PANEL: CPT

## 2024-04-16 PROCEDURE — 36415 COLL VENOUS BLD VENIPUNCTURE: CPT

## 2024-04-17 ENCOUNTER — TELEPHONE (OUTPATIENT)
Age: 64
End: 2024-04-17

## 2024-04-17 NOTE — TELEPHONE ENCOUNTER
"Per result note:    \"Stable labs - please call patient.     Start Rybelsus 7mg daily and check CMP in 1 month.  Nurse to see orders for CMP.       Message sent to patient via [facility] patient portal.\"    It is unclear if patient was advised to repeat labs in 1 month. Called patient, left message for patient to call the office back.  "

## 2024-04-17 NOTE — TELEPHONE ENCOUNTER
Patient called office back for lab results as requested. PCP comments shared by RN. Patient aware of Medication change to semaglutide (Rybelsus) 7 MG tablet.

## 2024-05-08 DIAGNOSIS — K21.9 GASTROESOPHAGEAL REFLUX DISEASE, UNSPECIFIED WHETHER ESOPHAGITIS PRESENT: ICD-10-CM

## 2024-05-08 RX ORDER — FAMOTIDINE 20 MG/1
20 TABLET, FILM COATED ORAL 2 TIMES DAILY
Qty: 180 TABLET | Refills: 1 | Status: SHIPPED | OUTPATIENT
Start: 2024-05-08

## 2024-06-20 NOTE — PROGRESS NOTES
UROLOGY PROGRESS NOTE   Patient Identifiers: Tobi Hoffmann (MRN [id number])  Date of Service: 6/20/2024    Subjective:   63-year-old man history of BPH.  He has been on tamsulosin.  His last PSA was 0.7.  Current PSA is pending.  Last PSA 0.7.    No new voiding complaints.    Reason for visit: BPH follow-up    Objective:     VITALS:    There were no vitals filed for this visit.        LABS:  Lab Results   Component Value Date    HGB 14.5 10/09/2023    HCT 47.5 10/09/2023    WBC 5.39 10/09/2023     10/09/2023   ]    Lab Results   Component Value Date    K 4.3 04/16/2024     04/16/2024    CO2 28 04/16/2024    BUN 17 04/16/2024    CREATININE 1.25 04/16/2024    CALCIUM 9.1 04/16/2024   ]        INPATIENT MEDS:    Current Outpatient Medications:     Accu-Chek Guide test strip, , Disp: , Rfl:     Accu-Chek Softclix Lancets lancets, , Disp: , Rfl:     atorvastatin (LIPITOR) 20 mg tablet, TAKE 1 TABLET BY MOUTH EVERY DAY IN THE EVENING, Disp: 90 tablet, Rfl: 1    Blood Glucose Monitoring Suppl (Accu-Chek Guide Me) w/Device KIT, TEST ONCE DAILY AS DIRECTED, Disp: 100 kit, Rfl: 0    cholecalciferol (VITAMIN D3) 25 mcg (1,000 units) tablet, Take 1,000 Units by mouth daily (Patient not taking: Reported on 10/27/2023), Disp: , Rfl:     Continuous Blood Gluc  (FreeStyle Thai 3 [place]anson) ADELIA, USE 1 EACH 1 (ONE) TIME FOR 1 DOSE, Disp: , Rfl:     Continuous Blood Gluc Sensor (FreeStyle Thai 3 Sensor) MISC, Use 1 each every 14 (fourteen) days, Disp: 6 each, Rfl: 3    famotidine (PEPCID) 20 mg tablet, TAKE 1 TABLET BY MOUTH TWICE A DAY, Disp: 180 tablet, Rfl: 1    lisinopril (ZESTRIL) 20 mg tablet, Take 1 tablet (20 mg total) by mouth daily, Disp: 90 tablet, Rfl: 2    loratadine (CLARITIN) 10 mg tablet, TAKE 1 TABLET BY MOUTH EVERY DAY (Patient taking differently: Take 10 mg by mouth daily as needed for allergies), Disp: 90 tablet, Rfl: 1    metFORMIN (GLUCOPHAGE) 850 mg tablet, TAKE 1 TABLET BY MOUTH 2  TIMES A DAY WITH MEALS., Disp: 180 tablet, Rfl: 1    metoprolol tartrate (LOPRESSOR) 50 mg tablet, Take 1 tablet (50 mg total) by mouth 2 (two) times a day, Disp: 180 tablet, Rfl: 2    Multiple Vitamin (multivitamin) capsule, Take 1 capsule by mouth daily, Disp: , Rfl:     rOPINIRole (REQUIP) 1 mg tablet, Take 1 tablet (1 mg total) by mouth daily at bedtime, Disp: 90 tablet, Rfl: 2    semaglutide (Rybelsus) 7 MG tablet, Take 1 tablet (7 mg total) by mouth daily Take on an empty stomach with 4 oz water, and wait 30 minutes before eating Do not start before April 17, 2024., Disp: 30 tablet, Rfl: 0    tamsulosin (FLOMAX) 0.4 mg, Take 1 capsule (0.4 mg total) by mouth daily with dinner Rx per Uro, Disp: , Rfl:       Physical Exam:   There were no vitals taken for this visit.  GEN: no acute distress    RESP: breathing comfortably with no accessory muscle use    ABD: soft, non-tender, non-distended   INCISION:    EXT: no significant peripheral edema   (Male): Penis circumcised, phallus normal, meatus patent.  Testicles descended into scrotum bilaterally without masses nor tenderness.  No inguinal hernias bilaterally.  PINA: Prostate is not enlarged at 30 grams. The prostate is not boggy. The prostate is not tender.  No nodules noted      RADIOLOGY:   none     Assessment:   #1.  BPH    Plan:   -Continue tamsulosin  -Follow-up 1 year with PSA prior to visit  -  -

## 2024-06-21 ENCOUNTER — OFFICE VISIT (OUTPATIENT)
Dept: UROLOGY | Facility: CLINIC | Age: 64
End: 2024-06-21
Payer: COMMERCIAL

## 2024-06-21 VITALS
BODY MASS INDEX: 41.18 KG/M2 | HEIGHT: 69 IN | WEIGHT: 278 LBS | DIASTOLIC BLOOD PRESSURE: 78 MMHG | HEART RATE: 67 BPM | SYSTOLIC BLOOD PRESSURE: 132 MMHG | OXYGEN SATURATION: 96 %

## 2024-06-21 DIAGNOSIS — N40.1 BENIGN PROSTATIC HYPERPLASIA WITH URINARY FREQUENCY: Primary | ICD-10-CM

## 2024-06-21 DIAGNOSIS — R35.0 BENIGN PROSTATIC HYPERPLASIA WITH URINARY FREQUENCY: Primary | ICD-10-CM

## 2024-06-21 PROCEDURE — 99213 OFFICE O/P EST LOW 20 MIN: CPT | Performed by: PHYSICIAN ASSISTANT

## 2024-07-10 ENCOUNTER — TELEPHONE (OUTPATIENT)
Dept: ADMINISTRATIVE | Facility: OTHER | Age: 64
End: 2024-07-10

## 2024-07-10 NOTE — TELEPHONE ENCOUNTER
07/10/24 12:16 PM    Patient contacted to bring Advance Directive, POLST, or Living Will document to next scheduled pcp visit.[facility] Department left message.    Thank you.  Roxana Polk  [facility]

## 2024-07-13 ENCOUNTER — HOSPITAL ENCOUNTER (EMERGENCY)
Facility: HOSPITAL | Age: 64
Discharge: HOME | End: 2024-07-13
Attending: EMERGENCY MEDICINE
Payer: COMMERCIAL

## 2024-07-13 VITALS
HEART RATE: 76 BPM | DIASTOLIC BLOOD PRESSURE: 86 MMHG | SYSTOLIC BLOOD PRESSURE: 147 MMHG | TEMPERATURE: 98 F | OXYGEN SATURATION: 100 % | RESPIRATION RATE: 20 BRPM

## 2024-07-13 DIAGNOSIS — T30.0 BURN: Primary | ICD-10-CM

## 2024-07-13 PROCEDURE — 96372 THER/PROPH/DIAG INJ SC/IM: CPT

## 2024-07-13 PROCEDURE — 99283 EMERGENCY DEPT VISIT LOW MDM: CPT

## 2024-07-13 PROCEDURE — 90715 TDAP VACCINE 7 YRS/> IM: CPT

## 2024-07-13 PROCEDURE — 90471 IMMUNIZATION ADMIN: CPT

## 2024-07-13 RX ORDER — GINSENG 100 MG
1 CAPSULE ORAL ONCE
Status: COMPLETED | OUTPATIENT
Start: 2024-07-13 | End: 2024-07-13

## 2024-07-13 RX ORDER — KETOROLAC TROMETHAMINE 30 MG/ML
15 INJECTION, SOLUTION INTRAMUSCULAR; INTRAVENOUS ONCE
Status: COMPLETED | OUTPATIENT
Start: 2024-07-13 | End: 2024-07-13

## 2024-07-13 RX ADMIN — KETOROLAC TROMETHAMINE 15 MG: 30 INJECTION, SOLUTION INTRAMUSCULAR; INTRAVENOUS at 12:58

## 2024-07-13 RX ADMIN — BACITRACIN 1 LARGE APPLICATION: 500 OINTMENT TOPICAL at 13:03

## 2024-07-13 RX ADMIN — TETANUS TOXOID, REDUCED DIPHTHERIA TOXOID AND ACELLULAR PERTUSSIS VACCINE, ADSORBED 0.5 ML: 5; 2.5; 8; 8; 2.5 SUSPENSION INTRAMUSCULAR at 13:01
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
## 2024-07-14 DIAGNOSIS — J30.2 SEASONAL ALLERGIES: ICD-10-CM

## 2024-07-15 ENCOUNTER — APPOINTMENT (OUTPATIENT)
Dept: LAB | Age: 64
End: 2024-07-15
Payer: COMMERCIAL

## 2024-07-15 DIAGNOSIS — N40.1 BENIGN PROSTATIC HYPERPLASIA WITH URINARY FREQUENCY: ICD-10-CM

## 2024-07-15 DIAGNOSIS — R35.0 BENIGN PROSTATIC HYPERPLASIA WITH URINARY FREQUENCY: ICD-10-CM

## 2024-07-15 DIAGNOSIS — Z12.5 SCREENING FOR PROSTATE CANCER: ICD-10-CM

## 2024-07-15 DIAGNOSIS — E11.9 TYPE 2 DIABETES MELLITUS WITHOUT COMPLICATION, WITHOUT LONG-TERM CURRENT USE OF INSULIN (HCC): ICD-10-CM

## 2024-07-15 LAB
ALBUMIN SERPL BCG-MCNC: 3.4 G/DL (ref 3.5–5)
ALP SERPL-CCNC: 55 U/L (ref 34–104)
ALT SERPL W P-5'-P-CCNC: 21 U/L (ref 7–52)
ANION GAP SERPL CALCULATED.3IONS-SCNC: 6 MMOL/L (ref 4–13)
AST SERPL W P-5'-P-CCNC: 20 U/L (ref 13–39)
BASOPHILS # BLD AUTO: 0.05 THOUSANDS/ÂΜL (ref 0–0.1)
BASOPHILS NFR BLD AUTO: 1 % (ref 0–1)
BILIRUB SERPL-MCNC: 0.71 MG/DL (ref 0.2–1)
BUN SERPL-MCNC: 17 MG/DL (ref 5–25)
CALCIUM ALBUM COR SERPL-MCNC: 9.3 MG/DL (ref 8.3–10.1)
CALCIUM SERPL-MCNC: 8.8 MG/DL (ref 8.4–10.2)
CHLORIDE SERPL-SCNC: 106 MMOL/L (ref 96–108)
CHOLEST SERPL-MCNC: 138 MG/DL
CO2 SERPL-SCNC: 27 MMOL/L (ref 21–32)
CREAT SERPL-MCNC: 1.25 MG/DL (ref 0.6–1.3)
EOSINOPHIL # BLD AUTO: 0.13 THOUSAND/ÂΜL (ref 0–0.61)
EOSINOPHIL NFR BLD AUTO: 2 % (ref 0–6)
ERYTHROCYTE [DISTWIDTH] IN BLOOD BY AUTOMATED COUNT: 14.5 % (ref 11.6–15.1)
EST. AVERAGE GLUCOSE BLD GHB EST-MCNC: 131 MG/DL
GFR SERPL CREATININE-BSD FRML MDRD: 60 ML/MIN/1.73SQ M
GLUCOSE P FAST SERPL-MCNC: 94 MG/DL (ref 65–99)
HBA1C MFR BLD: 6.2 %
HCT VFR BLD AUTO: 42 % (ref 36.5–49.3)
HDLC SERPL-MCNC: 36 MG/DL
HGB BLD-MCNC: 13.3 G/DL (ref 12–17)
IMM GRANULOCYTES # BLD AUTO: 0.02 THOUSAND/UL (ref 0–0.2)
IMM GRANULOCYTES NFR BLD AUTO: 0 % (ref 0–2)
LDLC SERPL CALC-MCNC: 79 MG/DL (ref 0–100)
LDLC SERPL DIRECT ASSAY-MCNC: 100 MG/DL (ref 0–100)
LYMPHOCYTES # BLD AUTO: 2.36 THOUSANDS/ÂΜL (ref 0.6–4.47)
LYMPHOCYTES NFR BLD AUTO: 40 % (ref 14–44)
MCH RBC QN AUTO: 29.2 PG (ref 26.8–34.3)
MCHC RBC AUTO-ENTMCNC: 31.7 G/DL (ref 31.4–37.4)
MCV RBC AUTO: 92 FL (ref 82–98)
MONOCYTES # BLD AUTO: 0.39 THOUSAND/ÂΜL (ref 0.17–1.22)
MONOCYTES NFR BLD AUTO: 7 % (ref 4–12)
NEUTROPHILS # BLD AUTO: 3.01 THOUSANDS/ÂΜL (ref 1.85–7.62)
NEUTS SEG NFR BLD AUTO: 50 % (ref 43–75)
NONHDLC SERPL-MCNC: 102 MG/DL
NRBC BLD AUTO-RTO: 0 /100 WBCS
PLATELET # BLD AUTO: 206 THOUSANDS/UL (ref 149–390)
PMV BLD AUTO: 9.9 FL (ref 8.9–12.7)
POTASSIUM SERPL-SCNC: 4.1 MMOL/L (ref 3.5–5.3)
PROT SERPL-MCNC: 7 G/DL (ref 6.4–8.4)
PSA SERPL-MCNC: 1.09 NG/ML (ref 0–4)
PSA SERPL-MCNC: 1.16 NG/ML (ref 0–4)
RBC # BLD AUTO: 4.56 MILLION/UL (ref 3.88–5.62)
SODIUM SERPL-SCNC: 139 MMOL/L (ref 135–147)
TRIGL SERPL-MCNC: 115 MG/DL
TSH SERPL DL<=0.05 MIU/L-ACNC: 0.91 UIU/ML (ref 0.45–4.5)
WBC # BLD AUTO: 5.96 THOUSAND/UL (ref 4.31–10.16)

## 2024-07-15 PROCEDURE — G0103 PSA SCREENING: HCPCS

## 2024-07-16 ENCOUNTER — OFFICE VISIT (OUTPATIENT)
Dept: FAMILY MEDICINE CLINIC | Facility: CLINIC | Age: 64
End: 2024-07-16
Payer: COMMERCIAL

## 2024-07-16 VITALS
HEART RATE: 76 BPM | TEMPERATURE: 98.4 F | WEIGHT: 280 LBS | HEIGHT: 69 IN | OXYGEN SATURATION: 96 % | SYSTOLIC BLOOD PRESSURE: 132 MMHG | RESPIRATION RATE: 18 BRPM | DIASTOLIC BLOOD PRESSURE: 76 MMHG | BODY MASS INDEX: 41.47 KG/M2

## 2024-07-16 DIAGNOSIS — G25.81 RLS (RESTLESS LEGS SYNDROME): ICD-10-CM

## 2024-07-16 DIAGNOSIS — J30.2 SEASONAL ALLERGIES: ICD-10-CM

## 2024-07-16 DIAGNOSIS — K21.9 GASTROESOPHAGEAL REFLUX DISEASE, UNSPECIFIED WHETHER ESOPHAGITIS PRESENT: ICD-10-CM

## 2024-07-16 DIAGNOSIS — E78.2 MIXED HYPERLIPIDEMIA: ICD-10-CM

## 2024-07-16 DIAGNOSIS — T30.0 BURN: Primary | ICD-10-CM

## 2024-07-16 DIAGNOSIS — N40.0 BPH WITHOUT OBSTRUCTION/LOWER URINARY TRACT SYMPTOMS: ICD-10-CM

## 2024-07-16 DIAGNOSIS — I10 ESSENTIAL HYPERTENSION: ICD-10-CM

## 2024-07-16 DIAGNOSIS — E66.01 CLASS 3 SEVERE OBESITY DUE TO EXCESS CALORIES WITH SERIOUS COMORBIDITY AND BODY MASS INDEX (BMI) OF 40.0 TO 44.9 IN ADULT (HCC): ICD-10-CM

## 2024-07-16 DIAGNOSIS — M17.0 PRIMARY OSTEOARTHRITIS OF BOTH KNEES: ICD-10-CM

## 2024-07-16 DIAGNOSIS — F32.0 CURRENT MILD EPISODE OF MAJOR DEPRESSIVE DISORDER WITHOUT PRIOR EPISODE (HCC): ICD-10-CM

## 2024-07-16 DIAGNOSIS — E11.9 TYPE 2 DIABETES MELLITUS WITHOUT COMPLICATION, WITHOUT LONG-TERM CURRENT USE OF INSULIN (HCC): ICD-10-CM

## 2024-07-16 DIAGNOSIS — M54.50 LOW BACK PAIN, UNSPECIFIED BACK PAIN LATERALITY, UNSPECIFIED CHRONICITY, UNSPECIFIED WHETHER SCIATICA PRESENT: ICD-10-CM

## 2024-07-16 DIAGNOSIS — G47.33 OSA (OBSTRUCTIVE SLEEP APNEA): ICD-10-CM

## 2024-07-16 DIAGNOSIS — R79.89 LOW VITAMIN D LEVEL: ICD-10-CM

## 2024-07-16 DIAGNOSIS — Z86.79 HISTORY OF ATRIAL FIBRILLATION: ICD-10-CM

## 2024-07-16 PROCEDURE — G2211 COMPLEX E/M VISIT ADD ON: HCPCS | Performed by: FAMILY MEDICINE

## 2024-07-16 PROCEDURE — 99215 OFFICE O/P EST HI 40 MIN: CPT | Performed by: FAMILY MEDICINE

## 2024-07-16 RX ORDER — ATORVASTATIN CALCIUM 20 MG/1
20 TABLET, FILM COATED ORAL EVERY EVENING
Qty: 90 TABLET | Refills: 2 | Status: SHIPPED | OUTPATIENT
Start: 2024-07-16

## 2024-07-16 RX ORDER — FAMOTIDINE 20 MG/1
20 TABLET, FILM COATED ORAL 2 TIMES DAILY
Qty: 180 TABLET | Refills: 2 | Status: SHIPPED | OUTPATIENT
Start: 2024-07-16

## 2024-07-16 RX ORDER — METOPROLOL TARTRATE 50 MG/1
50 TABLET, FILM COATED ORAL 2 TIMES DAILY
Qty: 180 TABLET | Refills: 2 | Status: SHIPPED | OUTPATIENT
Start: 2024-07-16

## 2024-07-16 RX ORDER — ROPINIROLE 1 MG/1
1 TABLET, FILM COATED ORAL
Qty: 90 TABLET | Refills: 2 | Status: SHIPPED | OUTPATIENT
Start: 2024-07-16

## 2024-07-16 RX ORDER — LORATADINE 10 MG/1
10 TABLET ORAL DAILY PRN
Qty: 90 TABLET | Refills: 2 | Status: SHIPPED | OUTPATIENT
Start: 2024-07-16

## 2024-07-16 RX ORDER — LISINOPRIL 20 MG/1
20 TABLET ORAL DAILY
Qty: 90 TABLET | Refills: 2 | Status: SHIPPED | OUTPATIENT
Start: 2024-07-16

## 2024-07-16 NOTE — PROGRESS NOTES
Diabetic Foot Exam    Patient's shoes and socks removed.    Right Foot/Ankle   Right Foot Inspection  Skin Exam: skin normal and skin intact. No dry skin, no warmth, no callus, no erythema, no maceration, no abnormal color, no pre-ulcer, no ulcer and no callus.     Toe Exam: ROM and strength within normal limits.     Sensory   Monofilament testing: diminished    Vascular  Capillary refills: elevated  The right DP pulse is 2+.     Left Foot/Ankle  Left Foot Inspection  Skin Exam: skin normal and skin intact. No dry skin, no warmth, no erythema, no maceration, normal color, no pre-ulcer, no ulcer and no callus.     Toe Exam: ROM and strength within normal limits.     Sensory   Monofilament testing: diminished    Vascular  Capillary refills: < 3 seconds  The left DP pulse is 2+.     Assign Risk Category  No deformity present  Loss of protective sensation  No weak pulses  Risk: 1         Repeat MRI brain in 6 months to evaluate meningioma. Can do in person or telephone visit.
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
## 2024-07-16 NOTE — PATIENT INSTRUCTIONS
PLEASE CALL:    [facility]  [address]  [address] Floor [place] [address] [phone]

## 2024-07-17 ENCOUNTER — TELEPHONE (OUTPATIENT)
Dept: PHYSICAL THERAPY | Facility: OTHER | Age: 64
End: 2024-07-17

## 2024-07-17 NOTE — TELEPHONE ENCOUNTER
Call placed to the patient per Comprehensive Spine Program referral.    Patient has a DIRECT PT SPINE REFERRAL since September 2023. Referral still active/pending. Patient can call [facility] PT S[facility] to schedule an evaluation [phone].    V/m left for patient explaining the program, protocol and what we offer. I also mentioned she has a DIRECT PT REFERRAL, provided direct PT site phone number.    CSP phone number and hours of business provided in case she has any questions to call us back.     CSP referral closed per protocol. (Duplicate).

## 2024-07-18 NOTE — ASSESSMENT & PLAN NOTE
Lab Results   Component Value Date    HGBA1C 6.2 (H) 07/15/2024       Lab Results   Component Value Date    HGBA1C 6.2 (H) 07/15/2024     Stable.  Continue Metformin.  Restart Rybelsus 3mg QD, then increase 7mg QD.  Patient declines DM education 9/12/23.  Recommend lifestyle modifications.    Goal blood sugars:  Fasting in AM - Less than 100. 2 hours after any meal - Less than 140.

## 2024-08-01 ENCOUNTER — TELEPHONE (OUTPATIENT)
Age: 64
End: 2024-08-01

## 2024-08-01 DIAGNOSIS — E11.9 TYPE 2 DIABETES MELLITUS WITHOUT COMPLICATION, WITHOUT LONG-TERM CURRENT USE OF INSULIN (HCC): ICD-10-CM

## 2024-08-01 NOTE — TELEPHONE ENCOUNTER
PA for Semaglutide (Rybelsus) 3 MG tablet Approved     Date(s) approved 8-1-2024 - 12-        Patient advised by          [x] COZerohart Message  [] Phone call   []LMOM  []L/M to call office as no active Communication consent on file  []Unable to leave detailed message as VM not approved on Communication consent       Pharmacy advised by    [x]Fax  []Phone call    Approval letter scanned into Media Yes

## 2024-08-01 NOTE — TELEPHONE ENCOUNTER
PA for Semaglutide (Rybelsus) 3 MG tablet SUBMITTED     via    []CMSummon-KEY   [x]CAD Crowd-Case ID # [id number]  []Faxed to plan   []Other website   []Phone call Case ID #     Office notes sent, clinical questions answered. Awaiting determination    Turnaround time for your insurance to make a decision on your Prior Authorization can take 7-21 business days.

## 2024-08-02 RX ORDER — ORAL SEMAGLUTIDE 3 MG/1
TABLET ORAL
Qty: 100 TABLET | Refills: 1 | Status: SHIPPED | OUTPATIENT
Start: 2024-08-02

## 2024-08-07 DIAGNOSIS — N40.0 BPH WITHOUT OBSTRUCTION/LOWER URINARY TRACT SYMPTOMS: ICD-10-CM

## 2024-08-07 RX ORDER — TAMSULOSIN HYDROCHLORIDE 0.4 MG/1
0.4 CAPSULE ORAL
Qty: 100 CAPSULE | Refills: 1 | Status: SHIPPED | OUTPATIENT
Start: 2024-08-07

## 2024-09-03 DIAGNOSIS — E11.9 TYPE 2 DIABETES MELLITUS WITHOUT COMPLICATION, WITHOUT LONG-TERM CURRENT USE OF INSULIN (HCC): ICD-10-CM

## 2024-09-03 DIAGNOSIS — N40.0 BPH WITHOUT OBSTRUCTION/LOWER URINARY TRACT SYMPTOMS: ICD-10-CM

## 2024-09-04 RX ORDER — TAMSULOSIN HYDROCHLORIDE 0.4 MG/1
0.4 CAPSULE ORAL
Qty: 100 CAPSULE | Refills: 1 | Status: SHIPPED | OUTPATIENT
Start: 2024-09-04

## 2024-11-12 ENCOUNTER — RA CDI HCC (OUTPATIENT)
Dept: OTHER | Facility: HOSPITAL | Age: 64
End: 2024-11-12

## 2024-11-13 ENCOUNTER — TELEPHONE (OUTPATIENT)
Dept: FAMILY MEDICINE CLINIC | Facility: CLINIC | Age: 64
End: 2024-11-13

## 2024-11-13 ENCOUNTER — VBI (OUTPATIENT)
Dept: ADMINISTRATIVE | Facility: OTHER | Age: 64
End: 2024-11-13

## 2024-11-13 NOTE — TELEPHONE ENCOUNTER
11/13/24 12:45 PM    Patient contacted to bring Advance Directive, POLST, or Living Will document to next scheduled pcp visit.[facility] Department left message.    Thank you.  Henrietta Monsalve  [facility]

## 2024-11-15 ENCOUNTER — RESULTS FOLLOW-UP (OUTPATIENT)
Dept: FAMILY MEDICINE CLINIC | Facility: CLINIC | Age: 64
End: 2024-11-15

## 2024-11-15 ENCOUNTER — APPOINTMENT (OUTPATIENT)
Dept: LAB | Facility: MEDICAL CENTER | Age: 64
End: 2024-11-15
Payer: COMMERCIAL

## 2024-11-15 DIAGNOSIS — F32.0 CURRENT MILD EPISODE OF MAJOR DEPRESSIVE DISORDER WITHOUT PRIOR EPISODE (HCC): ICD-10-CM

## 2024-11-15 DIAGNOSIS — R79.89 LOW VITAMIN D LEVEL: ICD-10-CM

## 2024-11-15 DIAGNOSIS — E78.2 MIXED HYPERLIPIDEMIA: ICD-10-CM

## 2024-11-15 DIAGNOSIS — E11.9 TYPE 2 DIABETES MELLITUS WITHOUT COMPLICATION, WITHOUT LONG-TERM CURRENT USE OF INSULIN (HCC): ICD-10-CM

## 2024-11-15 DIAGNOSIS — E66.01 CLASS 3 SEVERE OBESITY DUE TO EXCESS CALORIES WITH SERIOUS COMORBIDITY AND BODY MASS INDEX (BMI) OF 40.0 TO 44.9 IN ADULT (HCC): ICD-10-CM

## 2024-11-15 DIAGNOSIS — E66.813 CLASS 3 SEVERE OBESITY DUE TO EXCESS CALORIES WITH SERIOUS COMORBIDITY AND BODY MASS INDEX (BMI) OF 40.0 TO 44.9 IN ADULT (HCC): ICD-10-CM

## 2024-11-15 DIAGNOSIS — I10 ESSENTIAL HYPERTENSION: ICD-10-CM

## 2024-11-15 PROBLEM — E55.9 VITAMIN D DEFICIENCY: Status: ACTIVE | Noted: 2024-11-15

## 2024-11-15 LAB
25(OH)D3 SERPL-MCNC: 19.3 NG/ML (ref 30–100)
ALBUMIN SERPL BCG-MCNC: 3.6 G/DL (ref 3.5–5)
ALP SERPL-CCNC: 54 U/L (ref 34–104)
ALT SERPL W P-5'-P-CCNC: 19 U/L (ref 7–52)
ANION GAP SERPL CALCULATED.3IONS-SCNC: 7 MMOL/L (ref 4–13)
AST SERPL W P-5'-P-CCNC: 20 U/L (ref 13–39)
BILIRUB SERPL-MCNC: 0.5 MG/DL (ref 0.2–1)
BUN SERPL-MCNC: 16 MG/DL (ref 5–25)
CALCIUM SERPL-MCNC: 9.1 MG/DL (ref 8.4–10.2)
CHLORIDE SERPL-SCNC: 103 MMOL/L (ref 96–108)
CO2 SERPL-SCNC: 29 MMOL/L (ref 21–32)
CREAT SERPL-MCNC: 1.26 MG/DL (ref 0.6–1.3)
CREAT UR-MCNC: 192.9 MG/DL
EST. AVERAGE GLUCOSE BLD GHB EST-MCNC: 131 MG/DL
GFR SERPL CREATININE-BSD FRML MDRD: 59 ML/MIN/1.73SQ M
GLUCOSE P FAST SERPL-MCNC: 98 MG/DL (ref 65–99)
HBA1C MFR BLD: 6.2 %
LDLC SERPL DIRECT ASSAY-MCNC: 97 MG/DL (ref 0–100)
MICROALBUMIN UR-MCNC: 17.8 MG/L
MICROALBUMIN/CREAT 24H UR: 9 MG/G CREATININE (ref 0–30)
POTASSIUM SERPL-SCNC: 4.8 MMOL/L (ref 3.5–5.3)
PROT SERPL-MCNC: 7.3 G/DL (ref 6.4–8.4)
SODIUM SERPL-SCNC: 139 MMOL/L (ref 135–147)
TSH SERPL DL<=0.05 MIU/L-ACNC: 1.19 UIU/ML (ref 0.45–4.5)

## 2024-11-15 PROCEDURE — 36415 COLL VENOUS BLD VENIPUNCTURE: CPT

## 2024-11-15 PROCEDURE — 82043 UR ALBUMIN QUANTITATIVE: CPT

## 2024-11-15 PROCEDURE — 83721 ASSAY OF BLOOD LIPOPROTEIN: CPT

## 2024-11-15 PROCEDURE — 82570 ASSAY OF URINE CREATININE: CPT

## 2024-11-15 PROCEDURE — 83036 HEMOGLOBIN GLYCOSYLATED A1C: CPT

## 2024-11-15 PROCEDURE — 82306 VITAMIN D 25 HYDROXY: CPT

## 2024-11-15 PROCEDURE — 84443 ASSAY THYROID STIM HORMONE: CPT

## 2024-11-15 PROCEDURE — 80053 COMPREHEN METABOLIC PANEL: CPT

## 2024-11-18 ENCOUNTER — OFFICE VISIT (OUTPATIENT)
Dept: FAMILY MEDICINE CLINIC | Facility: CLINIC | Age: 64
End: 2024-11-18
Payer: COMMERCIAL

## 2024-11-18 VITALS
RESPIRATION RATE: 16 BRPM | BODY MASS INDEX: 41.44 KG/M2 | DIASTOLIC BLOOD PRESSURE: 68 MMHG | OXYGEN SATURATION: 96 % | WEIGHT: 279.8 LBS | HEIGHT: 69 IN | SYSTOLIC BLOOD PRESSURE: 126 MMHG | TEMPERATURE: 98.4 F | HEART RATE: 60 BPM

## 2024-11-18 DIAGNOSIS — E55.9 VITAMIN D DEFICIENCY: ICD-10-CM

## 2024-11-18 DIAGNOSIS — N40.0 BPH WITHOUT OBSTRUCTION/LOWER URINARY TRACT SYMPTOMS: ICD-10-CM

## 2024-11-18 DIAGNOSIS — Z86.79 HISTORY OF ATRIAL FIBRILLATION: ICD-10-CM

## 2024-11-18 DIAGNOSIS — G47.33 OSA (OBSTRUCTIVE SLEEP APNEA): ICD-10-CM

## 2024-11-18 DIAGNOSIS — K21.9 GASTROESOPHAGEAL REFLUX DISEASE, UNSPECIFIED WHETHER ESOPHAGITIS PRESENT: ICD-10-CM

## 2024-11-18 DIAGNOSIS — F32.0 CURRENT MILD EPISODE OF MAJOR DEPRESSIVE DISORDER WITHOUT PRIOR EPISODE (HCC): ICD-10-CM

## 2024-11-18 DIAGNOSIS — J30.2 SEASONAL ALLERGIES: ICD-10-CM

## 2024-11-18 DIAGNOSIS — M17.0 PRIMARY OSTEOARTHRITIS OF BOTH KNEES: ICD-10-CM

## 2024-11-18 DIAGNOSIS — E66.01 CLASS 3 SEVERE OBESITY DUE TO EXCESS CALORIES WITH SERIOUS COMORBIDITY AND BODY MASS INDEX (BMI) OF 40.0 TO 44.9 IN ADULT (HCC): ICD-10-CM

## 2024-11-18 DIAGNOSIS — E11.9 TYPE 2 DIABETES MELLITUS WITHOUT COMPLICATION, WITHOUT LONG-TERM CURRENT USE OF INSULIN (HCC): Primary | ICD-10-CM

## 2024-11-18 DIAGNOSIS — E78.2 MIXED HYPERLIPIDEMIA: ICD-10-CM

## 2024-11-18 DIAGNOSIS — E66.813 CLASS 3 SEVERE OBESITY DUE TO EXCESS CALORIES WITH SERIOUS COMORBIDITY AND BODY MASS INDEX (BMI) OF 40.0 TO 44.9 IN ADULT (HCC): ICD-10-CM

## 2024-11-18 DIAGNOSIS — G25.81 RLS (RESTLESS LEGS SYNDROME): ICD-10-CM

## 2024-11-18 DIAGNOSIS — I10 ESSENTIAL HYPERTENSION: ICD-10-CM

## 2024-11-18 PROCEDURE — G2211 COMPLEX E/M VISIT ADD ON: HCPCS | Performed by: FAMILY MEDICINE

## 2024-11-18 PROCEDURE — 99214 OFFICE O/P EST MOD 30 MIN: CPT | Performed by: FAMILY MEDICINE

## 2024-11-18 RX ORDER — TAMSULOSIN HYDROCHLORIDE 0.4 MG/1
0.4 CAPSULE ORAL
Status: SHIPPED
Start: 2024-11-18

## 2024-11-18 RX ORDER — ERGOCALCIFEROL 1.25 MG/1
50000 CAPSULE ORAL WEEKLY
Qty: 12 CAPSULE | Refills: 0 | Status: SHIPPED | OUTPATIENT
Start: 2024-11-18 | End: 2025-02-04

## 2024-11-18 NOTE — PATIENT INSTRUCTIONS
To Do:     Call for appt - Cardio Dr. Spencer - Next appt 11/24.  Call for appt - Sleep Medicine Dr. Chacko - Next appt 5/24 - Overdue    Vitamin D Deficiency - Recommend start multivitamin and prescription vitamin D (Drisdol).  When 12 weeks of Drisdol completed, continue multivitamin and start over-the-counter Vitamin D3 3,000 International Units daily.  Check vitamin D level 1 week after completing Drisdol.      Call Insurance to Ask About Diabetes Med Coverage -      GLP-1 Agonists - Pill - Rybelsus, etc., Injectable - Ozempic, Mounjaro, Trulicity, Victoza, Byetta, etc.  SGLT-2 Inhibitor - Pill - Jardiance, Farxiga, etc.       Please let us know about your medication selection and availability at pharmacy of your choice.

## 2024-11-18 NOTE — PROGRESS NOTES
Assessment/Plan:  Assessment & Plan  Type 2 diabetes mellitus without complication, without long-term current use of insulin ([facility])    Lab Results   Component Value Date    HGBA1C 6.2 (H) 11/15/2024       Orders:    Hemoglobin A1C; Future    Ambulatory referral to Ophthalmology; Future      Stable.  Continue Metformin.  Restart Rybelsus 3mg QD, then increase 7mg QD - patient to check coverage c insurance.  Patient declines DM education 9/12/23.  Recommend lifestyle modifications.     Goal blood sugars:  Fasting in AM - Less than 100. 2 hours after any meal - Less than 140.   Mixed hyperlipidemia    Orders:    CBC and differential; Future    Comprehensive metabolic panel; Future    LDL cholesterol, direct; Future    LDL at goal for DM2.  Continue Lipitor 20mg QHS.  Recommend lifestyle modifications.   Gastroesophageal reflux disease, unspecified whether esophagitis present         Stable. Continue Pepcid 20mg BID PRN.  Watch GERD diet.  To consider GI referral for EGD if uncontrolled?    Class 3 severe obesity due to excess calories with serious comorbidity and body mass index (BMI) of 40.0 to 44.9 in adult ([facility])      Orders:    TSH, 3rd generation with Free T4 reflex; Future    Stable. Recommend lifestyle modifications. Patient previously on Ozempic, but D/C due to cost. To consider Weight Management referral PRN?   Current mild episode of major depressive disorder without prior episode ([facility])      Orders:    TSH, 3rd generation with Free T4 reflex; Future    Stable s meds and counseling.    Essential hypertension    Orders:    CBC and differential; Future    Comprehensive metabolic panel; Future    Stable.  Check blood pressure outside of office.  Recommend lifestyle modifications.   RLS (restless legs syndrome)         Stable on Requip 1 mg nightly.  Primary osteoarthritis of both knees         Management per Ortho.       You may use Tylenol (Acetaminophen) up to 3,000mg daily (in 24 hours) as needed for pain or  fever.  JANEY (obstructive sleep apnea)         Patient intolerant of CPAP.  He is not interested in [employer].    Seasonal allergies         Stable on Claritin 10 mg daily.  History of atrial fibrillation    Orders:    TSH, 3rd generation with Free T4 reflex; Future    Management per Cardio.  Stable s/p ablation.  BPH without obstruction/lower urinary tract symptoms    Orders:    tamsulosin (FLOMAX) 0.4 mg; Take 1 capsule (0.4 mg total) by mouth daily with dinner Rx per Uro    Management per Uro.  Continue Flomax 0.4mg QHS.    Vitamin D deficiency    Orders:    Vitamin D 25 hydroxy; Future    ergocalciferol (ERGOCALCIFEROL) 1.25 MG (97409 UT) capsule; Take 1 capsule (50,000 Units total) by mouth once a week for 12 doses    Comprehensive metabolic panel; Future    New.    Vitamin D Deficiency - Recommend start multivitamin and prescription vitamin D (Drisdol).  When 12 weeks of Drisdol completed, continue multivitamin and start over-the-counter Vitamin D3 1,000-3,000 International Units daily.  Check vitamin D level 1 week after completing Drisdol.          Return in about 4 months (around 3/19/2025) for AWV /4mo - DM2, HTN, HL, Dep, CKD, GERD, Afib, RLS, Labs.      Future Appointments   Date Time Provider Department Center   3/19/2025 10:00 AM Britney Monson DO  [facility]   6/24/2025 11:30 AM Manjinder Meehan PA-C [facility]        Subjective:     Tobi is a 64 y.o. male who presents today for a follow-up on his chronic medical conditions.        HPI:  Chief Complaint   Patient presents with    Follow-up     4mo - DM2, HTN, HL, Dep, CKD, GERD, Afib, RLS, Labs. No new problems or concerns at this time. Has not had a DM eye exam, needs a new ref.      -- Above per clinical staff and reviewed. --      Diabetes  He presents for his follow-up diabetic visit. He has type 2 diabetes mellitus. Associated symptoms include blurred vision, visual change and weakness. Pertinent negatives for  diabetes include no chest pain and no fatigue. (Occurs 2 times per month, lasting less than 20 minutes.  Does not check BS.  ) Risk factors for coronary artery disease include male sex, obesity, hypertension, diabetes mellitus, dyslipidemia and sedentary lifestyle. Current diabetic treatment includes diet and oral agent (monotherapy). He is following a generally healthy diet. He never participates in exercise. (Not checking BS at home.) He does not see a podiatrist.Eye exam is not current.         Today:      Return in about 4 months (around 11/16/2024) for 4mo - DM2, HTN, HL, Dep, CKD, GERD, Afib, RLS, Labs       4mo OV         Morbid Obesity - Watching diet.  No regular exercise due to knee pain.  Does yard work.  Previously on Ozempic, but stopped after a couple months in 2021 as Rx was too expensive previously.       DM2 - On Metformin 850mg 1 pill BID (no higher dose previously).  He never started Rybelsus 7mg QD due to price, but he needs to review with insurance.  No Optho.  No Podiatry.  Declines DM Education 9/12/23.  No low BS.  Not using Freestyle Thai, but he does not know why.       HTN - On Metoprolol tartrate 50mg BID, Lisinopril 20mg QD.  No BP check outside of office.  Has wrist cuff at home.       Hyperlipidemia -  On Lipitor 20mg QHS.  D/C Zocor 40mg QHS due to inefficacy.  No higher dose or other statins previously.       CKD Stage 3 - No Renal consult previously.       Afib /Fam Hx Early CAD - Management per Cardio Dr. Spencer - Next appt 11/24.  On Metoprolol 50mg BID. Previously on Eliquis, which was D/C s/p ablation, failed cardioversion previously.         JANEY - Management per Sleep Medicine Dr. Chacko - Next appt 5/24 - Overdue.  Pending Sleep Study 10/24.  He is CPAP intolerant despite trying different masks.       GERD - On Pepcid 20mg BID PRN - using QD, and D/C Omeprazole 20mg QD (after taking for years).  No GI consult or EGD previously.  No other GERD meds.  Watching GERD diet.      Depression - Stable s meds.  Previously on unknown med D/C after 1-2 days due to weird nightmares.  No counseling previously.  Poor social supports.  No SI/HI/AH/VH.          PHQ-2/9 Depression Screening    Little interest or pleasure in doing things: 0 - not at all  Feeling down, depressed, or hopeless: 1 - several days  Trouble falling or staying asleep, or sleeping too much: 2 - more than half the days  Feeling tired or having little energy: 1 - several days  Poor appetite or overeatin - not at all  Feeling bad about yourself - or that you are a failure or have let yourself or your family down: 0 - not at all  Trouble concentrating on things, such as reading the newspaper or watching television: 0 - not at all  Moving or speaking so slowly that other people could have noticed. Or the opposite - being so fidgety or restless that you have been moving around a lot more than usual: 0 - not at all  Thoughts that you would be better off dead, or of hurting yourself in some way: 0 - not at all  PHQ-9 Score: 4  PHQ-9 Interpretation: No or Minimal depression       JAKE-7 Flowsheet Screening      Flowsheet Row Most Recent Value   Over the last two weeks, how often have you been bothered by the following problems?     Feeling nervous, anxious, or on edge 0   Not being able to stop or control worrying 0   Worrying too much about different things 0   Trouble relaxing  0   Being so restless that it's hard to sit still 1   Becoming easily annoyed or irritable  1   Feeling afraid as if something awful might happen 0   How difficult have these problems made it for you to do your work, take care of things at home, or get along with other people?  Not difficult at all   JAKE Score  2               RLS - Stable on Requp 1mg QHS.  No higher dose or other meds previously.         OA B/L Knees - Management per Ortho Dr. Johnson.  Next appt PRN.  Declined PT.       AR - On Claritin 10mg QD.       Nocturia - Management per Uro Yasmin  Feliciano Malhotra Next appt 6/25.  On Flomax 0.4mg QHS.  Does not see Uro.  Nocturia x 2.  Has urinary hesitancy.        Vitamin D Deficiency - Recommend start multivitamin and prescription vitamin D (Drisdol).  When 12 weeks of Drisdol completed, continue multivitamin and start over-the-counter Vitamin D3 1,000-3,000 International Units daily.  Check vitamin D level 1 week after completing Drisdol.       Reviewed:  Labs 11/15/24, Ortho 11/10/23, Uro 6/21/24, Cardio 11/27/23, Sleep Med 4/4/24     Sees Uro - Uro Yasmin Malhotra Next appt 6/25.       The following portions of the patient's history were reviewed and updated as appropriate: allergies, current medications, past family history, past medical history, past social history, past surgical history and problem list.      Review of Systems   Constitutional:  Negative for appetite change, chills, diaphoresis, fatigue and fever.   HENT:  Positive for congestion (Nasal).    Eyes:  Positive for blurred vision.   Respiratory:  Negative for chest tightness and shortness of breath.    Cardiovascular:  Negative for chest pain and palpitations.   Gastrointestinal:  Positive for diarrhea. Negative for abdominal pain, blood in stool, nausea and vomiting.   Genitourinary:  Negative for dysuria.   Neurological:  Positive for weakness.        Current Outpatient Medications   Medication Sig Dispense Refill    Accu-Chek Guide test strip       Accu-Chek Softclix Lancets lancets       atorvastatin (LIPITOR) 20 mg tablet Take 1 tablet (20 mg total) by mouth every evening 90 tablet 2    Blood Glucose Monitoring Suppl (Accu-Chek Guide Me) w/Device KIT TEST ONCE DAILY AS DIRECTED 100 kit 0    ergocalciferol (ERGOCALCIFEROL) 1.25 MG (85414 UT) capsule Take 1 capsule (50,000 Units total) by mouth once a week for 12 doses 12 capsule 0    famotidine (PEPCID) 20 mg tablet Take 1 tablet (20 mg total) by mouth 2 (two) times a day 180 tablet 2    lisinopril (ZESTRIL) 20 mg tablet Take  "1 tablet (20 mg total) by mouth daily 90 tablet 2    loratadine (CLARITIN) 10 mg tablet Take 1 tablet (10 mg total) by mouth daily as needed for allergies 90 tablet 2    metFORMIN (GLUCOPHAGE) 850 mg tablet Take 1 tablet (850 mg total) by mouth 2 (two) times a day with meals 180 tablet 2    metoprolol tartrate (LOPRESSOR) 50 mg tablet Take 1 tablet (50 mg total) by mouth 2 (two) times a day 180 tablet 2    Multiple Vitamin (multivitamin) capsule Take 1 capsule by mouth daily      rOPINIRole (REQUIP) 1 mg tablet Take 1 tablet (1 mg total) by mouth daily at bedtime 90 tablet 2    tamsulosin (FLOMAX) 0.4 mg Take 1 capsule (0.4 mg total) by mouth daily with dinner Rx per Uro      Continuous Blood Gluc  (FreeStyle Thai 3 [place]) ADELIA USE 1 EACH 1 (ONE) TIME FOR 1 DOSE (Patient not taking: Reported on 11/18/2024)      Continuous Blood Gluc Sensor (FreeStyle Thai 3 Sensor) MISC Use 1 each every 14 (fourteen) days (Patient not taking: Reported on 11/18/2024) 6 each 3    semaglutide (Rybelsus) 3 MG tablet TAKE 1 TABLET BY MOUTH DAILY. TAKE ON AN EMPTY STOMACH WITH 4 OUNCE WATER, AND WAIT 30 MINUTES BEFORE EATING (Patient not taking: Reported on 11/18/2024) 100 tablet 1     No current facility-administered medications for this visit.       Objective:  /68   Pulse 60   Temp 98.4 °F (36.9 °C) (Temporal)   Resp 16   Ht 5' 9\" (1.753 m)   Wt 127 kg (279 lb 12.8 oz)   SpO2 96%   BMI 41.32 kg/m²    Wt Readings from Last 3 Encounters:   11/18/24 127 kg (279 lb 12.8 oz)   07/16/24 127 kg (280 lb)   06/21/24 126 kg (278 lb)      BP Readings from Last 3 Encounters:   11/18/24 126/68   07/16/24 132/76   07/13/24 147/86          Physical Exam  Vitals and nursing note reviewed.   Constitutional:       General: He is not in acute distress.     Appearance: Normal appearance. He is well-developed. He is obese. He is not ill-appearing or toxic-appearing.   HENT:      Head: Normocephalic and atraumatic.   Eyes:      " "Conjunctiva/sclera: Conjunctivae normal.   Neck:      Thyroid: No thyromegaly.      Vascular: No carotid bruit.   Cardiovascular:      Rate and Rhythm: Normal rate and regular rhythm.      Pulses: Normal pulses.      Heart sounds: Normal heart sounds.   Pulmonary:      Effort: Pulmonary effort is normal.      Breath sounds: Normal breath sounds.   Abdominal:      General: Bowel sounds are normal. There is no distension.      Palpations: Abdomen is soft. There is no mass.      Tenderness: There is no abdominal tenderness. There is no guarding or rebound.   Musculoskeletal:         General: No swelling or tenderness.      Cervical back: Neck supple.      Right lower leg: No edema.      Left lower leg: No edema.   Lymphadenopathy:      Cervical: No cervical adenopathy.   Neurological:      General: No focal deficit present.      Mental Status: He is alert and oriented to person, place, and time. Mental status is at baseline.   Psychiatric:         Mood and Affect: Mood normal.         Behavior: Behavior normal.         Thought Content: Thought content normal.         Judgment: Judgment normal.         Lab Results:      Lab Results   Component Value Date    WBC 5.96 07/15/2024    HGB 13.3 07/15/2024    HCT 42.0 07/15/2024     07/15/2024    TRIG 115 07/15/2024    HDL 36 (L) 07/15/2024    LDLDIRECT 97 11/15/2024    ALT 19 11/15/2024    AST 20 11/15/2024    K 4.8 11/15/2024     11/15/2024    CREATININE 1.26 11/15/2024    BUN 16 11/15/2024    CO2 29 11/15/2024    PSA 1.093 07/15/2024    PSA 1.161 07/15/2024    GLUF 98 11/15/2024    HGBA1C 6.2 (H) 11/15/2024     No results found for: \"URICACID\"  Invalid input(s): \"BASENAME\" Vitamin D    No results found.     POCT Labs        Depression Screening and Follow-up Plan: Patient was screened for depression during today's encounter. They screened negative with a PHQ-9 score of 4.                    "

## 2024-11-18 NOTE — ASSESSMENT & PLAN NOTE
Orders:    CBC and differential; Future    Comprehensive metabolic panel; Future    Stable.  Check blood pressure outside of office.  Recommend lifestyle modifications.

## 2024-11-18 NOTE — ASSESSMENT & PLAN NOTE
Orders:    TSH, 3rd generation with Free T4 reflex; Future    Stable. Recommend lifestyle modifications. Patient previously on Ozempic, but D/C due to cost. To consider Weight Management referral PRN?

## 2024-11-18 NOTE — ASSESSMENT & PLAN NOTE
Orders:    tamsulosin (FLOMAX) 0.4 mg; Take 1 capsule (0.4 mg total) by mouth daily with dinner Rx per Uro    Management per Uro.  Continue Flomax 0.4mg QHS.

## 2024-11-18 NOTE — ASSESSMENT & PLAN NOTE
Orders:    Vitamin D 25 hydroxy; Future    ergocalciferol (ERGOCALCIFEROL) 1.25 MG (58533 UT) capsule; Take 1 capsule (50,000 Units total) by mouth once a week for 12 doses    Comprehensive metabolic panel; Future    New.    Vitamin D Deficiency - Recommend start multivitamin and prescription vitamin D (Drisdol).  When 12 weeks of Drisdol completed, continue multivitamin and start over-the-counter Vitamin D3 1,000-3,000 International Units daily.  Check vitamin D level 1 week after completing Drisdol.

## 2024-11-18 NOTE — ASSESSMENT & PLAN NOTE
Lab Results   Component Value Date    HGBA1C 6.2 (H) 11/15/2024       Orders:    Hemoglobin A1C; Future    Ambulatory referral to Ophthalmology; Future      Stable.  Continue Metformin.  Restart Rybelsus 3mg QD, then increase 7mg QD - patient to check coverage c insurance.  Patient declines DM education 9/12/23.  Recommend lifestyle modifications.     Goal blood sugars:  Fasting in AM - Less than 100. 2 hours after any meal - Less than 140.

## 2024-11-18 NOTE — ASSESSMENT & PLAN NOTE
Orders:    CBC and differential; Future    Comprehensive metabolic panel; Future    LDL cholesterol, direct; Future    LDL at goal for DM2.  Continue Lipitor 20mg QHS.  Recommend lifestyle modifications.

## 2024-11-18 NOTE — ASSESSMENT & PLAN NOTE
Orders:    TSH, 3rd generation with Free T4 reflex; Future    Management per Cardio.  Stable s/p ablation.

## 2024-11-27 ENCOUNTER — VBI (OUTPATIENT)
Dept: ADMINISTRATIVE | Facility: OTHER | Age: 64
End: 2024-11-27

## 2024-11-27 NOTE — TELEPHONE ENCOUNTER
11/27/24 11:47 AM     Chart reviewed for Hemoglobin A1c was/were submitted to the patient's insurance.     Jenn Brown MA   [facility]

## 2025-01-03 DIAGNOSIS — K21.9 GASTROESOPHAGEAL REFLUX DISEASE, UNSPECIFIED WHETHER ESOPHAGITIS PRESENT: ICD-10-CM

## 2025-01-04 RX ORDER — FAMOTIDINE 20 MG/1
20 TABLET, FILM COATED ORAL 2 TIMES DAILY
Qty: 180 TABLET | Refills: 1 | Status: SHIPPED | OUTPATIENT
Start: 2025-01-04

## 2025-02-09 DIAGNOSIS — E55.9 VITAMIN D DEFICIENCY: ICD-10-CM

## 2025-02-11 RX ORDER — ERGOCALCIFEROL 1.25 MG/1
50000 CAPSULE, LIQUID FILLED ORAL WEEKLY
Qty: 12 CAPSULE | Refills: 0 | OUTPATIENT
Start: 2025-02-11

## 2025-02-17 ENCOUNTER — APPOINTMENT (OUTPATIENT)
Dept: LAB | Facility: MEDICAL CENTER | Age: 65
End: 2025-02-17
Payer: COMMERCIAL

## 2025-02-17 ENCOUNTER — RESULTS FOLLOW-UP (OUTPATIENT)
Dept: FAMILY MEDICINE CLINIC | Facility: CLINIC | Age: 65
End: 2025-02-17

## 2025-02-17 DIAGNOSIS — E55.9 VITAMIN D DEFICIENCY: ICD-10-CM

## 2025-02-17 LAB — 25(OH)D3 SERPL-MCNC: 37.9 NG/ML (ref 30–100)

## 2025-02-17 PROCEDURE — 82306 VITAMIN D 25 HYDROXY: CPT

## 2025-02-17 PROCEDURE — 36415 COLL VENOUS BLD VENIPUNCTURE: CPT

## 2025-02-17 NOTE — RESULT ENCOUNTER NOTE
Vitamin D - Improved.  Recommend start multivitamin and over-the-counter vitamin D3 1000 - 3000 International Units daily.    Message sent to patient via [facility] patient portal.

## 2025-03-11 ENCOUNTER — APPOINTMENT (OUTPATIENT)
Dept: LAB | Facility: MEDICAL CENTER | Age: 65
End: 2025-03-11
Payer: COMMERCIAL

## 2025-03-11 DIAGNOSIS — E78.2 MIXED HYPERLIPIDEMIA: ICD-10-CM

## 2025-03-11 DIAGNOSIS — E66.813 CLASS 3 SEVERE OBESITY DUE TO EXCESS CALORIES WITH SERIOUS COMORBIDITY AND BODY MASS INDEX (BMI) OF 40.0 TO 44.9 IN ADULT (HCC): ICD-10-CM

## 2025-03-11 DIAGNOSIS — I10 ESSENTIAL HYPERTENSION: ICD-10-CM

## 2025-03-11 DIAGNOSIS — E11.9 TYPE 2 DIABETES MELLITUS WITHOUT COMPLICATION, WITHOUT LONG-TERM CURRENT USE OF INSULIN (HCC): ICD-10-CM

## 2025-03-11 DIAGNOSIS — F32.0 CURRENT MILD EPISODE OF MAJOR DEPRESSIVE DISORDER WITHOUT PRIOR EPISODE (HCC): ICD-10-CM

## 2025-03-11 DIAGNOSIS — E55.9 VITAMIN D DEFICIENCY: ICD-10-CM

## 2025-03-11 DIAGNOSIS — E66.01 CLASS 3 SEVERE OBESITY DUE TO EXCESS CALORIES WITH SERIOUS COMORBIDITY AND BODY MASS INDEX (BMI) OF 40.0 TO 44.9 IN ADULT (HCC): ICD-10-CM

## 2025-03-11 DIAGNOSIS — Z86.79 HISTORY OF ATRIAL FIBRILLATION: ICD-10-CM

## 2025-03-11 LAB
ALBUMIN SERPL BCG-MCNC: 3.6 G/DL (ref 3.5–5)
ALP SERPL-CCNC: 68 U/L (ref 34–104)
ALT SERPL W P-5'-P-CCNC: 11 U/L (ref 7–52)
ANION GAP SERPL CALCULATED.3IONS-SCNC: 7 MMOL/L (ref 4–13)
AST SERPL W P-5'-P-CCNC: 17 U/L (ref 13–39)
BASOPHILS # BLD AUTO: 0.09 THOUSANDS/ÂΜL (ref 0–0.1)
BASOPHILS NFR BLD AUTO: 1 % (ref 0–1)
BILIRUB SERPL-MCNC: 1 MG/DL (ref 0.2–1)
BUN SERPL-MCNC: 19 MG/DL (ref 5–25)
CALCIUM SERPL-MCNC: 9.3 MG/DL (ref 8.4–10.2)
CHLORIDE SERPL-SCNC: 102 MMOL/L (ref 96–108)
CO2 SERPL-SCNC: 28 MMOL/L (ref 21–32)
CREAT SERPL-MCNC: 1.37 MG/DL (ref 0.6–1.3)
EOSINOPHIL # BLD AUTO: 0.1 THOUSAND/ÂΜL (ref 0–0.61)
EOSINOPHIL NFR BLD AUTO: 2 % (ref 0–6)
ERYTHROCYTE [DISTWIDTH] IN BLOOD BY AUTOMATED COUNT: 14.3 % (ref 11.6–15.1)
EST. AVERAGE GLUCOSE BLD GHB EST-MCNC: 126 MG/DL
GFR SERPL CREATININE-BSD FRML MDRD: 53 ML/MIN/1.73SQ M
GLUCOSE P FAST SERPL-MCNC: 108 MG/DL (ref 65–99)
HBA1C MFR BLD: 6 %
HCT VFR BLD AUTO: 47.5 % (ref 36.5–49.3)
HGB BLD-MCNC: 14.5 G/DL (ref 12–17)
IMM GRANULOCYTES # BLD AUTO: 0.01 THOUSAND/UL (ref 0–0.2)
IMM GRANULOCYTES NFR BLD AUTO: 0 % (ref 0–2)
LDLC SERPL DIRECT ASSAY-MCNC: 104 MG/DL (ref 0–100)
LYMPHOCYTES # BLD AUTO: 2.4 THOUSANDS/ÂΜL (ref 0.6–4.47)
LYMPHOCYTES NFR BLD AUTO: 38 % (ref 14–44)
MCH RBC QN AUTO: 28.8 PG (ref 26.8–34.3)
MCHC RBC AUTO-ENTMCNC: 30.5 G/DL (ref 31.4–37.4)
MCV RBC AUTO: 94 FL (ref 82–98)
MONOCYTES # BLD AUTO: 0.54 THOUSAND/ÂΜL (ref 0.17–1.22)
MONOCYTES NFR BLD AUTO: 9 % (ref 4–12)
NEUTROPHILS # BLD AUTO: 3.2 THOUSANDS/ÂΜL (ref 1.85–7.62)
NEUTS SEG NFR BLD AUTO: 50 % (ref 43–75)
NRBC BLD AUTO-RTO: 0 /100 WBCS
PLATELET # BLD AUTO: 244 THOUSANDS/UL (ref 149–390)
PMV BLD AUTO: 9.8 FL (ref 8.9–12.7)
POTASSIUM SERPL-SCNC: 4.8 MMOL/L (ref 3.5–5.3)
PROT SERPL-MCNC: 7.2 G/DL (ref 6.4–8.4)
RBC # BLD AUTO: 5.03 MILLION/UL (ref 3.88–5.62)
SODIUM SERPL-SCNC: 137 MMOL/L (ref 135–147)
TSH SERPL DL<=0.05 MIU/L-ACNC: 0.54 UIU/ML (ref 0.45–4.5)
WBC # BLD AUTO: 6.34 THOUSAND/UL (ref 4.31–10.16)

## 2025-03-11 PROCEDURE — 83036 HEMOGLOBIN GLYCOSYLATED A1C: CPT

## 2025-03-11 PROCEDURE — 36415 COLL VENOUS BLD VENIPUNCTURE: CPT

## 2025-03-11 PROCEDURE — 83721 ASSAY OF BLOOD LIPOPROTEIN: CPT

## 2025-03-11 PROCEDURE — 80053 COMPREHEN METABOLIC PANEL: CPT

## 2025-03-11 PROCEDURE — 85025 COMPLETE CBC W/AUTO DIFF WBC: CPT

## 2025-03-11 PROCEDURE — 84443 ASSAY THYROID STIM HORMONE: CPT

## 2025-03-11 NOTE — RESULT ENCOUNTER NOTE
Unstable labs - will review with patient at upcoming appointment.    DM2 - Why is pt not taking Rybelsus 3mg QD?

## 2025-03-14 NOTE — PROGRESS NOTES
Assessment/Plan:  Assessment & Plan  Medicare annual wellness visit, subsequent         Type 2 diabetes mellitus without complication, without long-term current use of insulin ([facility])    Lab Results   Component Value Date    HGBA1C 6.0 (H) 03/11/2025       Stable.  Continue Metformin.  Restart Rybelsus 3mg QD, then increase 7mg QD - patient to check coverage c insurance.  Patient declines DM education 9/12/23.  Recommend lifestyle modifications.     Goal blood sugars:  Fasting in AM - Less than 100. 2 hours after any meal - Less than 140.     Orders:  •  Ambulatory Referral to Ophthalmology; Future  •  Hemoglobin A1C; Future  •  metFORMIN (GLUCOPHAGE) 850 mg tablet; Take 1 tablet (850 mg total) by mouth 2 (two) times a day with meals    Essential hypertension  Stable.  Check blood pressure outside of office.  Recommend lifestyle modifications.     Orders:  •  CBC and differential; Future  •  Comprehensive metabolic panel; Future  •  lisinopril (ZESTRIL) 20 mg tablet; Take 1 tablet (20 mg total) by mouth daily  •  metoprolol tartrate (LOPRESSOR) 50 mg tablet; Take 1 tablet (50 mg total) by mouth 2 (two) times a day    Mixed hyperlipidemia  LDL at goal for DM2.  Continue Lipitor 20mg QHS.  Recommend lifestyle modifications.     Orders:  •  CBC and differential; Future  •  Comprehensive metabolic panel; Future  •  Lipid panel; Future  •  TSH, 3rd generation with Free T4 reflex; Future  •  LDL cholesterol, direct; Future    Current mild episode of major depressive disorder without prior episode (HCC)  Depression Screening Follow-up Plan: Patient's depression screening was positive with a PHQ-9 score of 6.      Stable s meds and counseling.    Class 3 severe obesity due to excess calories with serious comorbidity and body mass index (BMI) of 40.0 to 44.9 in adult (HCC)  Improved.   Recommend lifestyle modifications. Patient previously on Ozempic, but D/C due to cost. To consider Weight Management referral PRN?         Morbid obesity with BMI of 40.0-44.9, adult (HCC)      Improved.   Recommend lifestyle modifications. Patient previously on Ozempic, but D/C due to cost. To consider Weight Management referral PRN?        Housing insecurity    Orders:  •  Ambulatory Referral to Social Work Care Management Program; Future    Encounter for screening involving social determinants of health (SDoH)    Orders:  •  Ambulatory Referral to Social Work Care Management Program; Future    Gastroesophageal reflux disease, unspecified whether esophagitis present  Stable. Continue Pepcid 20mg BID PRN.  Watch GERD diet.  To consider GI referral for EGD if uncontrolled?         RLS (restless legs syndrome)  Stable on Requip 1 mg nightly.    Orders:  •  rOPINIRole (REQUIP) 1 mg tablet; Take 1 tablet (1 mg total) by mouth daily at bedtime    Vitamin D deficiency  Improved.  Recommend start multivitamin and over-the-counter vitamin D3 1000 - 3000 International Units daily.     Orders:  •  Vitamin D 25 hydroxy; Future    Primary osteoarthritis of both knees  Management per Ortho.       You may use Tylenol (Acetaminophen) up to 3,000mg daily (in 24 hours) as needed for pain or fever.         JANEY (obstructive sleep apnea)  Patient intolerant of CPAP.  He is not interested in [employer].         Seasonal allergies    Orders:  •  loratadine (CLARITIN) 10 mg tablet; Take 1 tablet (10 mg total) by mouth daily as needed for allergies    History of atrial fibrillation  Stable on Claritin 10 mg daily.       BPH without obstruction/lower urinary tract symptoms  Management per Uro. Continue Flomax 0.4mg QHS.        Influenza  Improving.         Wheezing  Stable.  Patient declines Albuterol HfA PRN.         Sensation of plugged ear on right side    Orders:  •  Ambulatory Referral to Otolaryngology; Future    Paroxysmal atrial fibrillation (HCC)  Management per Cardio.               Return in about 4 months (around 7/19/2025) for 30min - 4mo - DM2, HTN, HL, Dep,  CKD, GERD, Afib, RLS, Labs.      Future Appointments   Date Time Provider Department Center   6/24/2025 11:30 AM Manjinder Meehan PA-C [facility]   7/21/2025 10:30 AM Britney Monson DO  [facility]        Subjective:     Tobi is a 64 y.o. male who presents today for a follow-up on his chronic medical conditions.        HPI:  Chief Complaint   Patient presents with   • Follow-up   • Medicare Wellness Visit     4 mo     -- Above per clinical staff and reviewed. --      Diabetes  He presents for his follow-up diabetic visit. He has type 2 diabetes mellitus. There are no hypoglycemic associated symptoms. Associated symptoms include fatigue (Decreased due to Flu). Pertinent negatives for diabetes include no chest pain. Risk factors for coronary artery disease include diabetes mellitus, male sex, obesity and sedentary lifestyle. Current diabetic treatment includes diet and oral agent (monotherapy). His weight is decreasing steadily. He is following a generally healthy diet. He never participates in exercise. (Not checking BS at home.  ) An ACE inhibitor/angiotensin II receptor blocker is being taken. He does not see a podiatrist.Eye exam is not current.         Today:      Return in about 4 months (around 3/19/2025) for AWV /4mo - DM2, HTN, HL, Dep, CKD, GERD, Afib, RLS, Labs.       4mo OV         Morbid Obesity - Watching diet.  No regular exercise due to knee pain.  Does yard work.  Previously on Ozempic, but stopped after a couple months in 2021 as Rx was too expensive previously.       DM2 - On Metformin 850mg 1 pill BID (no higher dose previously).  He never started Rybelsus 7mg QD due to price, but he needs to review with insurance.  No Optho.  No Podiatry.  Declines DM Education 9/12/23.  No low BS.  Not using Freestyle Thai, but he does not know why.       HTN - On Metoprolol tartrate 50mg BID, Lisinopril 20mg QD.  No BP check outside of office.  Has wrist cuff at home.        Hyperlipidemia -  On Lipitor 20mg QHS.  D/C Zocor 40mg QHS due to inefficacy.  No higher dose or other statins previously.       CKD Stage 3 - No Renal consult previously.       Afib /Fam Hx Early CAD - Management per Cardio Dr. Spencer - Next appt  - Overdue.  On Metoprolol 50mg BID. Previously on Eliquis, which was D/C s/p ablation, failed cardioversion previously.         JANEY - Management per Sleep Medicine Dr. Chacko - Next appt  - Overdue.  Pending Sleep Study 10/24.  He is CPAP intolerant despite trying different masks.       GERD - On Pepcid 20mg BID PRN - using QD, and D/C Omeprazole 20mg QD (after taking for years).  No GI consult or EGD previously.  No other GERD meds.  Watching GERD diet.     Depression - Stable s meds.  Previously on unknown med D/C after 1-2 days due to weird nightmares.  No counseling previously.  Poor social supports.  No SI/HI/AH/VH.          PHQ-2/9 Depression Screening    Little interest or pleasure in doing things: 0 - not at all  Feeling down, depressed, or hopeless: 0 - not at all  Trouble falling or staying asleep, or sleeping too much: 2 - more than half the days  Feeling tired or having little energy: 2 - more than half the days  Poor appetite or overeatin - more than half the days  Feeling bad about yourself - or that you are a failure or have let yourself or your family down: 0 - not at all  Trouble concentrating on things, such as reading the newspaper or watching television: 0 - not at all  Moving or speaking so slowly that other people could have noticed. Or the opposite - being so fidgety or restless that you have been moving around a lot more than usual: 0 - not at all  Thoughts that you would be better off dead, or of hurting yourself in some way: 0 - not at all  PHQ-9 Score: 6  PHQ-9 Interpretation: Mild depression       JAKE-7 Flowsheet Screening    Flowsheet Row Most Recent Value   Over the last two weeks, how often have you been bothered by the following  problems?     Feeling nervous, anxious, or on edge 0   Not being able to stop or control worrying 0   Worrying too much about different things 0   Trouble relaxing  0   Being so restless that it's hard to sit still 0   Becoming easily annoyed or irritable  0   Feeling afraid as if something awful might happen 0   How difficult have these problems made it for you to do your work, take care of things at home, or get along with other people?  Not difficult at all   JAKE Score  0          RLS - Stable on Requp 1mg QHS.  No higher dose or other meds previously.         OA B/L Knees - Management per Ortho Dr. Johnson.  Next appt PRN.  Declined PT.       AR - On Claritin 10mg QD.       Nocturia - Management per Loki Malhotra PA-C - Next appt 6/25.  On Flomax 0.4mg QHS.  Nocturia x 2.  Has urinary hesitancy.         Vitamin D Deficiency - s/p Drisdol.  Taking multivitamin and Vitamin D3 3,000IU daily.        Reviewed:  Labs 3/11/25, Ortho 11/10/23, Uro 6/21/24, Cardio 11/27/23, Sleep Med 4/4/24     Sees Uro Marya [facility]ro Yasmin Malhotra PA-C - Next appt 6/25.     The following portions of the patient's history were reviewed and updated as appropriate: allergies, current medications, past family history, past medical history, past social history, past surgical history and problem list.      Review of Systems   Constitutional:  Positive for appetite change (Decreased due to Flu) and fatigue (Decreased due to Flu). Negative for chills, diaphoresis and fever.   Respiratory:  Positive for cough (Decreased due to Flu). Negative for chest tightness and shortness of breath.    Cardiovascular:  Negative for chest pain.   Gastrointestinal:  Negative for abdominal pain, blood in stool, diarrhea, nausea and vomiting.   Genitourinary:  Negative for dysuria.        Current Outpatient Medications   Medication Sig Dispense Refill   • Accu-Chek Guide test strip      • Accu-Chek Softclix Lancets lancets      • atorvastatin (LIPITOR) 20 mg tablet  "Take 1 tablet (20 mg total) by mouth every evening 90 tablet 2   • Blood Glucose Monitoring Suppl (Accu-Chek Guide Me) w/Device KIT TEST ONCE DAILY AS DIRECTED 100 kit 0   • famotidine (PEPCID) 20 mg tablet Take 1 tablet (20 mg total) by mouth 2 (two) times a day 180 tablet 1   • lisinopril (ZESTRIL) 20 mg tablet Take 1 tablet (20 mg total) by mouth daily 90 tablet 2   • loratadine (CLARITIN) 10 mg tablet Take 1 tablet (10 mg total) by mouth daily as needed for allergies 90 tablet 2   • metFORMIN (GLUCOPHAGE) 850 mg tablet Take 1 tablet (850 mg total) by mouth 2 (two) times a day with meals 180 tablet 2   • metoprolol tartrate (LOPRESSOR) 50 mg tablet Take 1 tablet (50 mg total) by mouth 2 (two) times a day 180 tablet 2   • Multiple Vitamin (multivitamin) capsule Take 1 capsule by mouth daily     • rOPINIRole (REQUIP) 1 mg tablet Take 1 tablet (1 mg total) by mouth daily at bedtime 90 tablet 2   • tamsulosin (FLOMAX) 0.4 mg Take 1 capsule (0.4 mg total) by mouth daily with dinner Rx per Uro     • Continuous Blood Gluc  (FreeStyle Thai 3 [place]) ADELIA USE 1 EACH 1 (ONE) TIME FOR 1 DOSE (Patient not taking: Reported on 11/18/2024)     • Continuous Blood Gluc Sensor (FreeStyle Thai 3 Sensor) MISC Use 1 each every 14 (fourteen) days (Patient not taking: Reported on 11/18/2024) 6 each 3     No current facility-administered medications for this visit.       Objective:  /70 (BP Location: Left arm, Patient Position: Sitting, Cuff Size: Large)   Pulse 68   Temp 98.1 °F (36.7 °C) (Tympanic)   Resp 16   Ht 5' 9\" (1.753 m)   Wt 123 kg (271 lb)   SpO2 96%   BMI 40.02 kg/m²    Wt Readings from Last 3 Encounters:   03/19/25 123 kg (271 lb)   11/18/24 127 kg (279 lb 12.8 oz)   07/16/24 127 kg (280 lb)      BP Readings from Last 3 Encounters:   03/19/25 106/70   11/18/24 126/68 07/16/24 132/76          Physical Exam  Vitals and nursing note reviewed.   Constitutional:       General: He is not in acute " "distress.     Appearance: Normal appearance. He is well-developed. He is obese. He is not ill-appearing or toxic-appearing.   HENT:      Head: Normocephalic and atraumatic.      Right Ear: Tympanic membrane, ear canal and external ear normal.      Left Ear: Tympanic membrane, ear canal and external ear normal.   Eyes:      Conjunctiva/sclera: Conjunctivae normal.   Neck:      Thyroid: No thyromegaly.   Cardiovascular:      Rate and Rhythm: Normal rate and regular rhythm.      Pulses: Normal pulses.      Heart sounds: Normal heart sounds.   Pulmonary:      Effort: Pulmonary effort is normal.      Breath sounds: No decreased air movement. Examination of the right-lower field reveals wheezing. Wheezing present.   Abdominal:      General: Bowel sounds are normal. There is no distension.      Palpations: Abdomen is soft. There is no mass.      Tenderness: There is no abdominal tenderness. There is no guarding or rebound.   Musculoskeletal:         General: No swelling or tenderness.      Cervical back: Neck supple.      Right lower leg: No edema.      Left lower leg: No edema.   Neurological:      General: No focal deficit present.      Mental Status: He is alert and oriented to person, place, and time.   Psychiatric:         Mood and Affect: Mood normal.         Behavior: Behavior normal.         Thought Content: Thought content normal.         Judgment: Judgment normal.         Lab Results:      Lab Results   Component Value Date    WBC 6.34 03/11/2025    HGB 14.5 03/11/2025    HCT 47.5 03/11/2025     03/11/2025    TRIG 115 07/15/2024    HDL 36 (L) 07/15/2024    LDLDIRECT 104 (H) 03/11/2025    ALT 11 03/11/2025    AST 17 03/11/2025    K 4.8 03/11/2025     03/11/2025    CREATININE 1.37 (H) 03/11/2025    BUN 19 03/11/2025    CO2 28 03/11/2025    PSA 1.093 07/15/2024    PSA 1.161 07/15/2024    GLUF 108 (H) 03/11/2025    HGBA1C 6.0 (H) 03/11/2025     No results found for: \"URICACID\"  Invalid input(s): " "\"BASENAME\" Vitamin D    No results found.     POCT Labs        Depression Screening and Follow-up Plan: Patient's depression screening was positive with a PHQ-9 score of 6.   Patient advised to follow-up with PCP for further management.                     "

## 2025-03-18 ENCOUNTER — TELEPHONE (OUTPATIENT)
Dept: ADMINISTRATIVE | Facility: OTHER | Age: 65
End: 2025-03-18

## 2025-03-18 ENCOUNTER — TELEPHONE (OUTPATIENT)
Dept: FAMILY MEDICINE CLINIC | Facility: CLINIC | Age: 65
End: 2025-03-18

## 2025-03-18 NOTE — TELEPHONE ENCOUNTER
03/18/25 1:21 PM    Patient contacted to bring Advance Directive, POLST, or Living Will document to next scheduled pcp visit.[facility] Department left message.    Thank you.  Yaa Rangel MA  [facility]

## 2025-03-18 NOTE — TELEPHONE ENCOUNTER
Called pt marleny to change pcp with ins. He was  notified back in 11/24 but it still isnt changed.

## 2025-03-19 ENCOUNTER — OFFICE VISIT (OUTPATIENT)
Dept: FAMILY MEDICINE CLINIC | Facility: CLINIC | Age: 65
End: 2025-03-19
Payer: COMMERCIAL

## 2025-03-19 VITALS
SYSTOLIC BLOOD PRESSURE: 106 MMHG | WEIGHT: 271 LBS | DIASTOLIC BLOOD PRESSURE: 70 MMHG | RESPIRATION RATE: 16 BRPM | HEIGHT: 69 IN | BODY MASS INDEX: 40.14 KG/M2 | TEMPERATURE: 98.1 F | OXYGEN SATURATION: 96 % | HEART RATE: 68 BPM

## 2025-03-19 DIAGNOSIS — E55.9 VITAMIN D DEFICIENCY: ICD-10-CM

## 2025-03-19 DIAGNOSIS — Z00.00 MEDICARE ANNUAL WELLNESS VISIT, SUBSEQUENT: Primary | ICD-10-CM

## 2025-03-19 DIAGNOSIS — G25.81 RLS (RESTLESS LEGS SYNDROME): ICD-10-CM

## 2025-03-19 DIAGNOSIS — I48.0 PAROXYSMAL ATRIAL FIBRILLATION (HCC): ICD-10-CM

## 2025-03-19 DIAGNOSIS — I10 ESSENTIAL HYPERTENSION: ICD-10-CM

## 2025-03-19 DIAGNOSIS — H93.8X1 SENSATION OF PLUGGED EAR ON RIGHT SIDE: ICD-10-CM

## 2025-03-19 DIAGNOSIS — G47.33 OSA (OBSTRUCTIVE SLEEP APNEA): ICD-10-CM

## 2025-03-19 DIAGNOSIS — E66.813 CLASS 3 SEVERE OBESITY DUE TO EXCESS CALORIES WITH SERIOUS COMORBIDITY AND BODY MASS INDEX (BMI) OF 40.0 TO 44.9 IN ADULT (HCC): ICD-10-CM

## 2025-03-19 DIAGNOSIS — Z13.9 ENCOUNTER FOR SCREENING INVOLVING SOCIAL DETERMINANTS OF HEALTH (SDOH): ICD-10-CM

## 2025-03-19 DIAGNOSIS — N40.0 BPH WITHOUT OBSTRUCTION/LOWER URINARY TRACT SYMPTOMS: ICD-10-CM

## 2025-03-19 DIAGNOSIS — E78.2 MIXED HYPERLIPIDEMIA: ICD-10-CM

## 2025-03-19 DIAGNOSIS — E11.9 TYPE 2 DIABETES MELLITUS WITHOUT COMPLICATION, WITHOUT LONG-TERM CURRENT USE OF INSULIN (HCC): ICD-10-CM

## 2025-03-19 DIAGNOSIS — E66.01 CLASS 3 SEVERE OBESITY DUE TO EXCESS CALORIES WITH SERIOUS COMORBIDITY AND BODY MASS INDEX (BMI) OF 40.0 TO 44.9 IN ADULT (HCC): ICD-10-CM

## 2025-03-19 DIAGNOSIS — Z59.819 HOUSING INSECURITY: ICD-10-CM

## 2025-03-19 DIAGNOSIS — F32.0 CURRENT MILD EPISODE OF MAJOR DEPRESSIVE DISORDER WITHOUT PRIOR EPISODE (HCC): ICD-10-CM

## 2025-03-19 DIAGNOSIS — R06.2 WHEEZING: ICD-10-CM

## 2025-03-19 DIAGNOSIS — Z86.79 HISTORY OF ATRIAL FIBRILLATION: ICD-10-CM

## 2025-03-19 DIAGNOSIS — K21.9 GASTROESOPHAGEAL REFLUX DISEASE, UNSPECIFIED WHETHER ESOPHAGITIS PRESENT: ICD-10-CM

## 2025-03-19 DIAGNOSIS — J30.2 SEASONAL ALLERGIES: ICD-10-CM

## 2025-03-19 DIAGNOSIS — J11.1 INFLUENZA: ICD-10-CM

## 2025-03-19 DIAGNOSIS — M17.0 PRIMARY OSTEOARTHRITIS OF BOTH KNEES: ICD-10-CM

## 2025-03-19 DIAGNOSIS — E66.01 MORBID OBESITY WITH BMI OF 40.0-44.9, ADULT (HCC): ICD-10-CM

## 2025-03-19 PROCEDURE — G0136 PR ADM OF SOC DTR ASSESS 5-15 M: HCPCS | Performed by: FAMILY MEDICINE

## 2025-03-19 PROCEDURE — G0537 PR RISK ASCVD TST ONCE PR 12 MO: HCPCS | Performed by: FAMILY MEDICINE

## 2025-03-19 PROCEDURE — 99214 OFFICE O/P EST MOD 30 MIN: CPT | Performed by: FAMILY MEDICINE

## 2025-03-19 PROCEDURE — G0442 ANNUAL ALCOHOL SCREEN 15 MIN: HCPCS | Performed by: FAMILY MEDICINE

## 2025-03-19 PROCEDURE — G0439 PPPS, SUBSEQ VISIT: HCPCS | Performed by: FAMILY MEDICINE

## 2025-03-19 RX ORDER — METOPROLOL TARTRATE 50 MG
50 TABLET ORAL 2 TIMES DAILY
Qty: 180 TABLET | Refills: 2 | Status: SHIPPED | OUTPATIENT
Start: 2025-03-19

## 2025-03-19 RX ORDER — ROPINIROLE 1 MG/1
1 TABLET, FILM COATED ORAL
Qty: 90 TABLET | Refills: 2 | Status: SHIPPED | OUTPATIENT
Start: 2025-03-19

## 2025-03-19 RX ORDER — LISINOPRIL 20 MG/1
20 TABLET ORAL DAILY
Qty: 90 TABLET | Refills: 2 | Status: SHIPPED | OUTPATIENT
Start: 2025-03-19

## 2025-03-19 RX ORDER — LORATADINE 10 MG/1
10 TABLET ORAL DAILY PRN
Qty: 90 TABLET | Refills: 2 | Status: SHIPPED | OUTPATIENT
Start: 2025-03-19

## 2025-03-19 SDOH — ECONOMIC STABILITY - HOUSING INSECURITY: HOUSING INSTABILITY UNSPECIFIED: Z59.819

## 2025-03-19 NOTE — PROGRESS NOTES
Name: Tobi Hoffmann      : 1960      MRN: [id number]  Encounter Provider: Britney Monson DO  Encounter Date: 3/19/2025   Encounter department: [facility]    Assessment & Plan  Medicare annual wellness visit, subsequent         Type 2 diabetes mellitus without complication, without long-term current use of insulin ([facility])    Lab Results   Component Value Date    HGBA1C 6.0 (H) 2025     Orders:  •  Ambulatory Referral to Ophthalmology; Future  •  Hemoglobin A1C; Future  •  metFORMIN (GLUCOPHAGE) 850 mg tablet; Take 1 tablet (850 mg total) by mouth 2 (two) times a day with meals    Essential hypertension    Orders:  •  CBC and differential; Future  •  Comprehensive metabolic panel; Future  •  lisinopril (ZESTRIL) 20 mg tablet; Take 1 tablet (20 mg total) by mouth daily  •  metoprolol tartrate (LOPRESSOR) 50 mg tablet; Take 1 tablet (50 mg total) by mouth 2 (two) times a day    Mixed hyperlipidemia    Orders:  •  CBC and differential; Future  •  Comprehensive metabolic panel; Future  •  Lipid panel; Future  •  TSH, 3rd generation with Free T4 reflex; Future  •  LDL cholesterol, direct; Future    Current mild episode of major depressive disorder without prior episode ([facility])  Depression Screening Follow-up Plan: Patient's depression screening was positive with a PHQ-9 score of 6.        Class 3 severe obesity due to excess calories with serious comorbidity and body mass index (BMI) of 40.0 to 44.9 in adult (HCC)         Morbid obesity with BMI of 40.0-44.9, adult (HCC)         Housing insecurity    Orders:  •  Ambulatory Referral to Social Work Care Management Program; Future    Encounter for screening involving social determinants of health (SDoH)    Orders:  •  Ambulatory Referral to Social Work Care Management Program; Future    Gastroesophageal reflux disease, unspecified whether esophagitis present         RLS (restless legs syndrome)    Orders:  •  rOPINIRole (REQUIP)  1 mg tablet; Take 1 tablet (1 mg total) by mouth daily at bedtime    Vitamin D deficiency    Orders:  •  Vitamin D 25 hydroxy; Future    Primary osteoarthritis of both knees         JANEY (obstructive sleep apnea)         Seasonal allergies    Orders:  •  loratadine (CLARITIN) 10 mg tablet; Take 1 tablet (10 mg total) by mouth daily as needed for allergies    History of atrial fibrillation         BPH without obstruction/lower urinary tract symptoms         Influenza         Wheezing         Sensation of plugged ear on right side    Orders:  •  Ambulatory Referral to Otolaryngology; Future    Paroxysmal atrial fibrillation (HCC)            Preventive health issues were discussed with patient, and age appropriate screening tests were ordered as noted in patient's After Visit Summary. Personalized health advice and appropriate referrals for health education or preventive services given if needed, as noted in patient's After Visit Summary.    History of Present Illness     HPI   Patient Care Team:  Britney Monson DO as PCP - General (Family Medicine)  Maren Das MD as PCP - [facility]CP-[facility] ([facility])    Review of Systems    See other note.      Medical History Reviewed by provider this encounter:  Tobacco  Allergies  Meds  Problems  Med Hx  Surg Hx  Fam Hx       Annual Wellness Visit Questionnaire   Tobi is here for his Subsequent Wellness visit.     Health Risk Assessment:   Patient rates overall health as fair. Patient feels that their physical health rating is slightly worse. Patient is very satisfied with their life. Eyesight was rated as much worse. Hearing was rated as slightly worse. Patient feels that their emotional and mental health rating is slightly better. Patients states they are sometimes angry. Patient states they are often unusually tired/fatigued. Pain experienced in the last 7 days has been a lot. Patient's pain rating has been 5/10. Patient states that he has experienced weight loss  or gain in last 6 months.     Depression Screening:   PHQ-9 Score: 6      Fall Risk Screening:   In the past year, patient has experienced: no history of falling in past year      Home Safety:  Patient does not have trouble with stairs inside or outside of their home. Patient has working smoke alarms and has no working carbon monoxide detector. Home safety hazards include: none.     Nutrition:   Current diet is Diabetic, Unhealthy and Frequent junk food.     Medications:   Patient is currently taking over-the-counter supplements. OTC medications include: see medication list. Patient is able to manage medications.     Activities of Daily Living (ADLs)/Instrumental Activities of Daily Living (IADLs):   Walk and transfer into and out of bed and chair?: Yes  Dress and groom yourself?: Yes    Bathe or shower yourself?: Yes    Feed yourself? Yes  Do your laundry/housekeeping?: Yes  Manage your money, pay your bills and track your expenses?: Yes  Make your own meals?: Yes    Do your own shopping?: Yes    Previous Hospitalizations:   Any hospitalizations or ED visits within the last 12 months?: No      Advance Care Planning:   Living will: No    Durable POA for healthcare: No    Advanced directive: No    Advanced directive counseling given: Yes    Patient declined ACP directive: No      Cognitive Screening:   Provider or family/friend/caregiver concerned regarding cognition?: No    PREVENTIVE SCREENINGS      Cardiovascular Screening:    General: Screening Not Indicated and History Lipid Disorder      Diabetes Screening:     General: Screening Not Indicated and History Diabetes      Colorectal Cancer Screening:     General: Screening Current      Prostate Cancer Screening:    General: Screening Current      Osteoporosis Screening:    General: Screening Not Indicated      Abdominal Aortic Aneurysm (AAA) Screening:    Risk factors include: age between 65-74 yo        General: Screening Not Indicated      Lung Cancer Screening:      General: Screening Not Indicated      Hepatitis C Screening:    General: Screening Current    Cardiovascular Risk Assessment:  Patient does not have underlying ASCVD and their cardiovascular risk was assessed today. Their cardiovascular risk factors include: hypertension, hyperlipidemia, overweight/obesity, pre-diabetes or diabetes and family history of cardiovascular disease.     The 10-year ASCVD risk score (Sami GABRIEL, et al., 2019) is: 18.5%    Values used to calculate the score:      Age: 64 years      Sex: Male      Is Non- : No      Diabetic: Yes      Tobacco smoker: No      Systolic Blood Pressure: 106 mmHg      Is BP treated: Yes      HDL Cholesterol: 36 mg/dL      Total Cholesterol: 138 mg/dL    Time spent assessing cardiovascular risk: 5 minutes.     Screening, Brief Intervention, and Referral to Treatment (SBIRT)     Screening  Typical number of drinks in a day: 0  Typical number of drinks in a week: 0  Interpretation: Low risk drinking behavior.    AUDIT-C Screenin) How often did you have a drink containing alcohol in the past year? monthly or less  2) How many drinks did you have on a typical day when you were drinking in the past year? 1 to 2  3) How often did you have 6 or more drinks on one occasion in the past year? never    AUDIT-C Score: 1  Interpretation: Score 0-3 (male): Negative screen for alcohol misuse    Single Item Drug Screening:  How often have you used an illegal drug (including marijuana) or a prescription medication for non-medical reasons in the past year? never    Single Item Drug Screen Score: 0  Interpretation: Negative screen for possible drug use disorder    Brief Intervention  Alcohol & drug use screenings were reviewed. No concerns regarding substance use disorder identified.     Time Spent  Time spent screening/evaluating the patient for alcohol misuse: 5 minutes.     SDOH Risk Assessment  Social determinants of health (SDOH) risk assesment  "tool was completed. The tool at a minimum covered housing stability, food insecurity, transportation needs, and utility difficulty. Patient had at risk responses for the following SDOH domains: housing stability and utilities.     Time spent regarding SDOH risk assessment was 5 minutes. The patient has unmet SDOH needs that are interfering with the diagnosis or treatment of their illness.    Other Counseling Topics:   Calcium and vitamin D intake and regular weightbearing exercise.     Social Drivers of Health     Financial Resource Strain: Low Risk  (2/22/2024)    Overall Financial Resource Strain (CARDIA)    • Difficulty of Paying Living Expenses: Not very hard   Food Insecurity: Patient Declined (3/18/2025)    Hunger Vital Sign    • Worried About Running Out of Food in the Last Year: Patient declined    • Ran Out of Food in the Last Year: Patient declined   Transportation Needs: No Transportation Needs (3/18/2025)    PRAPARE - Transportation    • Lack of Transportation (Medical): No    • Lack of Transportation (Non-Medical): No   Housing Stability: High Risk (3/18/2025)    Housing Stability Vital Sign    • Unable to Pay for Housing in the Last Year: Yes    • Number of Times Moved in the Last Year: 0    • Homeless in the Last Year: No   Utilities: At Risk (3/18/2025)    [facility] Utilities    • Threatened with loss of utilities: Yes     No results found.    Objective   /70 (BP Location: Left arm, Patient Position: Sitting, Cuff Size: Large)   Pulse 68   Temp 98.1 °F (36.7 °C) (Tympanic)   Resp 16   Ht 5' 9\" (1.753 m)   Wt 123 kg (271 lb)   SpO2 96%   BMI 40.02 kg/m²     Physical Exam    See other note.        "

## 2025-03-19 NOTE — ASSESSMENT & PLAN NOTE
Lab Results   Component Value Date    HGBA1C 6.0 (H) 03/11/2025       Stable.  Continue Metformin.  Restart Rybelsus 3mg QD, then increase 7mg QD - patient to check coverage c insurance.  Patient declines DM education 9/12/23.  Recommend lifestyle modifications.     Goal blood sugars:  Fasting in AM - Less than 100. 2 hours after any meal - Less than 140.     Orders:  •  Ambulatory Referral to Ophthalmology; Future  •  Hemoglobin A1C; Future  •  metFORMIN (GLUCOPHAGE) 850 mg tablet; Take 1 tablet (850 mg total) by mouth 2 (two) times a day with meals

## 2025-03-19 NOTE — ASSESSMENT & PLAN NOTE
Lab Results   Component Value Date    HGBA1C 6.0 (H) 03/11/2025     Orders:  •  Ambulatory Referral to Ophthalmology; Future  •  Hemoglobin A1C; Future  •  metFORMIN (GLUCOPHAGE) 850 mg tablet; Take 1 tablet (850 mg total) by mouth 2 (two) times a day with meals

## 2025-03-19 NOTE — ASSESSMENT & PLAN NOTE
Orders:  •  rOPINIRole (REQUIP) 1 mg tablet; Take 1 tablet (1 mg total) by mouth daily at bedtime

## 2025-03-19 NOTE — ASSESSMENT & PLAN NOTE
Orders:  •  loratadine (CLARITIN) 10 mg tablet; Take 1 tablet (10 mg total) by mouth daily as needed for allergies

## 2025-03-19 NOTE — ASSESSMENT & PLAN NOTE
Orders:  •  CBC and differential; Future  •  Comprehensive metabolic panel; Future  •  lisinopril (ZESTRIL) 20 mg tablet; Take 1 tablet (20 mg total) by mouth daily  •  metoprolol tartrate (LOPRESSOR) 50 mg tablet; Take 1 tablet (50 mg total) by mouth 2 (two) times a day

## 2025-03-19 NOTE — ASSESSMENT & PLAN NOTE
Depression Screening Follow-up Plan: Patient's depression screening was positive with a PHQ-9 score of 6.      Stable s meds and counseling.

## 2025-03-19 NOTE — ASSESSMENT & PLAN NOTE
Improved.  Recommend start multivitamin and over-the-counter vitamin D3 1000 - 3000 International Units daily.     Orders:  •  Vitamin D 25 hydroxy; Future

## 2025-03-19 NOTE — PATIENT INSTRUCTIONS
Advise [place] med sinus rinse kit, Mucinex, Claritin/Zyrtec/Allegra/Xyzal, Flonase / Nasacort nasal spray.  Avoid decongestants if you have high blood pressure.      To Do:      Call for appt - Cardio Dr. Spencer - Next appt 11/24 - Overdue  Call for appt - Sleep Medicine Dr. Chacko - Next appt 5/24 - Overdue      Call Insurance to Ask About Diabetes Med Coverage -      GLP-1 Agonists - Pill - Rybelsus, etc., Injectable - Ozempic, Mounjaro, Trulicity, Victoza, Byetta, etc.  SGLT-2 Inhibitor - Pill - Jardiance, Farxiga, etc.       Please let us know about your medication selection and availability at pharmacy of your choice.        Medicare Preventive Visit Patient Instructions  Thank you for completing your Welcome to Medicare Visit or Medicare Annual Wellness Visit today. Your next wellness visit will be due in one year (3/20/2026).  The screening/preventive services that you may require over the next 5-10 years are detailed below. Some tests may not apply to you based off risk factors and/or age. Screening tests ordered at today's visit but not completed yet may show as past due. Also, please note that scanned in results may not display below.  Preventive Screenings:  Service Recommendations Previous Testing/Comments   Colorectal Cancer Screening  Colonoscopy    Fecal Occult Blood Test (FOBT)/Fecal Immunochemical Test (FIT)  Fecal DNA/Cologuard Test  Flexible Sigmoidoscopy Age: 45-75 years old   Colonoscopy: every 10 years (May be performed more frequently if at higher risk)  OR  FOBT/FIT: every 1 year  OR  Cologuard: every 3 years  OR  Sigmoidoscopy: every 5 years  Screening may be recommended earlier than age 45 if at higher risk for colorectal cancer. Also, an individualized decision between you and your healthcare provider will decide whether screening between the ages of 76-85 would be appropriate. Colonoscopy: Not on file  FOBT/FIT: Not on file  Cologuard: 03/02/2023  Sigmoidoscopy: Not on file    Screening  Current     Prostate Cancer Screening Individualized decision between patient and health care provider in men between ages of 55-69   Medicare will cover every 12 months beginning on the day after your 50th birthday PSA: 1.093 ng/mL; 1.161 ng/mL     Screening Current     Hepatitis C Screening Once for adults born between 1945 and 1965  More frequently in patients at high risk for Hepatitis C Hep C Antibody: 10/09/2023    Screening Current   Diabetes Screening 1-2 times per year if you're at risk for diabetes or have pre-diabetes Fasting glucose: 108 mg/dL (3/11/2025)  A1C: 6.0 % (3/11/2025)  Screening Not Indicated  History Diabetes   Cholesterol Screening Once every 5 years if you don't have a lipid disorder. May order more often based on risk factors. Lipid panel: 07/15/2024  Screening Not Indicated  History Lipid Disorder      Other Preventive Screenings Covered by Medicare:  Abdominal Aortic Aneurysm (AAA) Screening: covered once if your at risk. You're considered to be at risk if you have a family history of AAA or a male between the age of 65-75 who smoking at least 100 cigarettes in your lifetime.  Lung Cancer Screening: covers low dose CT scan once per year if you meet all of the following conditions: (1) Age 55-77; (2) No signs or symptoms of lung cancer; (3) Current smoker or have quit smoking within the last 15 years; (4) You have a tobacco smoking history of at least 20 pack years (packs per day x number of years you smoked); (5) You get a written order from a healthcare provider.  Glaucoma Screening: covered annually if you're considered high risk: (1) You have diabetes OR (2) Family history of glaucoma OR (3)  aged 50 and older OR (4)  American aged 65 and older  Osteoporosis Screening: covered every 2 years if you meet one of the following conditions: (1) Have a vertebral abnormality; (2) On glucocorticoid therapy for more than 3 months; (3) Have primary hyperparathyroidism;  (4) On osteoporosis medications and need to assess response to drug therapy.  HIV Screening: covered annually if you're between the age of 15-65. Also covered annually if you are younger than 15 and older than 65 with risk factors for HIV infection. For pregnant patients, it is covered up to 3 times per pregnancy.    Immunizations:  Immunization Recommendations   Influenza Vaccine Annual influenza vaccination during flu season is recommended for all persons aged >= 6 months who do not have contraindications   Pneumococcal Vaccine   * Pneumococcal conjugate vaccine = PCV13 (Prevnar 13), PCV15 (Vaxneuvance), PCV20 (Prevnar 20)  * Pneumococcal polysaccharide vaccine = PPSV23 (Pneumovax) Adults 19-63 yo with certain risk factors or if 65+ yo  If never received any pneumonia vaccine: recommend Prevnar 20 (PCV20)  Give PCV20 if previously received 1 dose of PCV13 or PPSV23   Hepatitis B Vaccine 3 dose series if at intermediate or high risk (ex: diabetes, end stage renal disease, liver disease)   Respiratory syncytial virus (RSV) Vaccine - COVERED BY MEDICARE PART D  * RSVPreF3 (Arexvy) CDC recommends that adults 60 years of age and older may receive a single dose of RSV vaccine using shared clinical decision-making (SCDM)   Tetanus (Td) Vaccine - COST NOT COVERED BY MEDICARE PART B Following completion of primary series, a booster dose should be given every 10 years to maintain immunity against tetanus. Td may also be given as tetanus wound prophylaxis.   Tdap Vaccine - COST NOT COVERED BY MEDICARE PART B Recommended at least once for all adults. For pregnant patients, recommended with each pregnancy.   Shingles Vaccine (Shingrix) - COST NOT COVERED BY MEDICARE PART B  2 shot series recommended in those 19 years and older who have or will have weakened immune systems or those 50 years and older     Health Maintenance Due:      Topic Date Due    Colorectal Cancer Screening  03/02/2026    HIV Screening  Completed     Hepatitis C Screening  Completed     Immunizations Due:  There are no preventive care reminders to display for this patient.  Advance Directives   What are advance directives?  Advance directives are legal documents that state your wishes and plans for medical care. These plans are made ahead of time in case you lose your ability to make decisions for yourself. Advance directives can apply to any medical decision, such as the treatments you want, and if you want to donate organs.   What are the types of advance directives?  There are many types of advance directives, and each state has rules about how to use them. You may choose a combination of any of the following:  Living will:  This is a written record of the treatment you want. You can also choose which treatments you do not want, which to limit, and which to stop at a certain time. This includes surgery, medicine, IV fluid, and tube feedings.   Durable power of  for healthcare (DPAHC):  This is a written record that states who you want to make healthcare choices for you when you are unable to make them for yourself. This person, called a proxy, is usually a family member or a friend. You may choose more than 1 proxy.  Do not resuscitate (DNR) order:  A DNR order is used in case your heart stops beating or you stop breathing. It is a request not to have certain forms of treatment, such as CPR. A DNR order may be included in other types of advance directives.  Medical directive:  This covers the care that you want if you are in a coma, near death, or unable to make decisions for yourself. You can list the treatments you want for each condition. Treatment may include pain medicine, surgery, blood transfusions, dialysis, IV or tube feedings, and a ventilator (breathing machine).  Values history:  This document has questions about your views, beliefs, and how you feel and think about life. This information can help others choose the care that you would  choose.  Why are advance directives important?  An advance directive helps you control your care. Although spoken wishes may be used, it is better to have your wishes written down. Spoken wishes can be misunderstood, or not followed. Treatments may be given even if you do not want them. An advance directive may make it easier for your family to make difficult choices about your care.   Weight Management   Why it is important to manage your weight:  Being overweight increases your risk of health conditions such as heart disease, high blood pressure, type 2 diabetes, and certain types of cancer. It can also increase your risk for osteoarthritis, sleep apnea, and other respiratory problems. Aim for a slow, steady weight loss. Even a small amount of weight loss can lower your risk of health problems.  How to lose weight safely:  A safe and healthy way to lose weight is to eat fewer calories and get regular exercise. You can lose up about 1 pound a week by decreasing the number of calories you eat by 500 calories each day.   Healthy meal plan for weight management:  A healthy meal plan includes a variety of foods, contains fewer calories, and helps you stay healthy. A healthy meal plan includes the following:  Eat whole-grain foods more often.  A healthy meal plan should contain fiber. Fiber is the part of grains, fruits, and vegetables that is not broken down by your body. Whole-grain foods are healthy and provide extra fiber in your diet. Some examples of whole-grain foods are whole-wheat breads and pastas, oatmeal, brown rice, and bulgur.  Eat a variety of vegetables every day.  Include dark, leafy greens such as spinach, kale, aron greens, and mustard greens. Eat yellow and orange vegetables such as carrots, sweet potatoes, and winter squash.   Eat a variety of fruits every day.  Choose fresh or canned fruit (canned in its own juice or light syrup) instead of juice. Fruit juice has very little or no fiber.  Eat  low-fat dairy foods.  Drink fat-free (skim) milk or 1% milk. Eat fat-free yogurt and low-fat cottage cheese. Try low-fat cheeses such as mozzarella and other reduced-fat cheeses.  Choose meat and other protein foods that are low in fat.  Choose beans or other legumes such as split peas or lentils. Choose fish, skinless poultry (chicken or turkey), or lean cuts of red meat (beef or pork). Before you cook meat or poultry, cut off any visible fat.   Use less fat and oil.  Try baking foods instead of frying them. Add less fat, such as margarine, sour cream, regular salad dressing and mayonnaise to foods. Eat fewer high-fat foods. Some examples of high-fat foods include french fries, doughnuts, ice cream, and cakes.  Eat fewer sweets.  Limit foods and drinks that are high in sugar. This includes candy, cookies, regular soda, and sweetened drinks.  Exercise:  Exercise at least 30 minutes per day on most days of the week. Some examples of exercise include walking, biking, dancing, and swimming. You can also fit in more physical activity by taking the stairs instead of the elevator or parking farther away from stores. Ask your healthcare provider about the best exercise plan for you.      © Copyright [employer] 2018 Information is for End User's use only and may not be sold, redistributed or otherwise used for commercial purposes. All illustrations and images included in CareNotes® are the copyrighted property of [employer] or [employer]

## 2025-03-19 NOTE — ASSESSMENT & PLAN NOTE
Stable.  Check blood pressure outside of office.  Recommend lifestyle modifications.     Orders:  •  CBC and differential; Future  •  Comprehensive metabolic panel; Future  •  lisinopril (ZESTRIL) 20 mg tablet; Take 1 tablet (20 mg total) by mouth daily  •  metoprolol tartrate (LOPRESSOR) 50 mg tablet; Take 1 tablet (50 mg total) by mouth 2 (two) times a day

## 2025-03-19 NOTE — ASSESSMENT & PLAN NOTE
Stable on Requip 1 mg nightly.    Orders:  •  rOPINIRole (REQUIP) 1 mg tablet; Take 1 tablet (1 mg total) by mouth daily at bedtime

## 2025-03-19 NOTE — ASSESSMENT & PLAN NOTE
Depression Screening Follow-up Plan: Patient's depression screening was positive with a PHQ-9 score of 6.

## 2025-03-20 ENCOUNTER — PATIENT OUTREACH (OUTPATIENT)
Dept: CASE MANAGEMENT | Facility: OTHER | Age: 65
End: 2025-03-20

## 2025-03-20 NOTE — PROGRESS NOTES
Received referral from patients PCP Britney Monson DO requesting that [facility] outreach patient and assess for needs. Per chart review, patient had PCP visit on 03/19 and reported housing insecurity via [facility] report.     Attempted outreaching patient, no answer and left message for return call.

## 2025-03-24 ENCOUNTER — PATIENT OUTREACH (OUTPATIENT)
Dept: CASE MANAGEMENT | Facility: OTHER | Age: 65
End: 2025-03-24

## 2025-03-24 NOTE — PROGRESS NOTES
2nd attempt outreaching patient to assess for needs.  Per chart review, patient had PCP visit on 03/19 and reported housing insecurity via [facility] report. No answer and left message. UTR letter was sent.

## 2025-03-24 NOTE — LETTER
[address] [facility] [address]  [phone]    Re: Unable to reach    3/24/2025       Dear Tobi,    I am a [facility]  and wanted to be certain you had information to contact me should you desire assistance with or have questions about non-medical aspects of your care such as [but not limited to] medical insurance, housing, transportation, material needs, or emergency needs.  If I do not have an answer I will assist you in finding the appropriate agency or individual who can help.      Please feel free to contact me at Dept: [phone]. Thank You.    Sincerely,         Vicky Boyd

## 2025-05-09 ENCOUNTER — VBI (OUTPATIENT)
Dept: ADMINISTRATIVE | Facility: OTHER | Age: 65
End: 2025-05-09

## 2025-05-09 NOTE — TELEPHONE ENCOUNTER
05/09/25 10:52 AM     Chart reviewed for Diabetic Eye Exam ; nothing is submitted to the patient's insurance at this time.     Jenn Brown MA   [facility]

## 2025-05-12 NOTE — ASSESSMENT & PLAN NOTE
LDL at goal for DM2.  Continue Lipitor 20mg QHS.  Recommend lifestyle modifications.     Orders:  •  CBC and differential; Future  •  Comprehensive metabolic panel; Future  •  Lipid panel; Future  •  TSH, 3rd generation with Free T4 reflex; Future  •  LDL cholesterol, direct; Future   done

## 2025-06-19 DIAGNOSIS — K21.9 GASTROESOPHAGEAL REFLUX DISEASE, UNSPECIFIED WHETHER ESOPHAGITIS PRESENT: ICD-10-CM

## 2025-06-19 RX ORDER — FAMOTIDINE 20 MG/1
20 TABLET, FILM COATED ORAL 2 TIMES DAILY
Qty: 180 TABLET | Refills: 1 | Status: SHIPPED | OUTPATIENT
Start: 2025-06-19

## 2025-06-24 ENCOUNTER — OFFICE VISIT (OUTPATIENT)
Dept: UROLOGY | Facility: CLINIC | Age: 65
End: 2025-06-24
Payer: COMMERCIAL

## 2025-06-24 VITALS
WEIGHT: 275 LBS | DIASTOLIC BLOOD PRESSURE: 68 MMHG | HEIGHT: 69 IN | HEART RATE: 55 BPM | SYSTOLIC BLOOD PRESSURE: 112 MMHG | BODY MASS INDEX: 40.73 KG/M2 | OXYGEN SATURATION: 97 %

## 2025-06-24 DIAGNOSIS — N40.1 BENIGN PROSTATIC HYPERPLASIA WITH URINARY FREQUENCY: Primary | ICD-10-CM

## 2025-06-24 DIAGNOSIS — R35.0 BENIGN PROSTATIC HYPERPLASIA WITH URINARY FREQUENCY: Primary | ICD-10-CM

## 2025-06-24 PROCEDURE — 99213 OFFICE O/P EST LOW 20 MIN: CPT | Performed by: PHYSICIAN ASSISTANT

## 2025-06-24 RX ORDER — OMEGA-3S/DHA/EPA/FISH OIL/D3 300MG-1000
1000 CAPSULE ORAL DAILY
COMMUNITY

## 2025-06-24 NOTE — PROGRESS NOTES
"Name: Tobi Hoffmann      : 1960      MRN: [id number]  Encounter Provider: Manjinder Meehan PA-C  Encounter Date: 2025   Encounter department: [facility]  :  Assessment & Plan  Benign prostatic hyperplasia with urinary frequency    Orders:    PSA Total, Diagnostic; Future  follow up one year psa prior      History of Present Illness   Tobi Hoffmann is a 64 y.o. male who presents hx  BPH. On tamsulosin. No side effects. Still has some slow flow and nocturia.  AUA SYMPTOM SCORE      Flowsheet Row Most Recent Value   AUA SYMPTOM SCORE    How often have you had a sensation of not emptying your bladder completely after you finished urinating? 2 (P)     How often have you had to urinate again less than two hours after you finished urinating? 1 (P)     How often have you found you stopped and started again several times when you urinate? 4 (P)     How often have you found it difficult to postpone urination? 5 (P)     How often have you had a weak urinary stream? 4 (P)     How often have you had to push or strain to begin urination? 3 (P)     How many times did you most typically get up to urinate from the time you went to bed at night until the time you got up in the morning? 5 (P)     Quality of Life: If you were to spend the rest of your life with your urinary condition just the way it is now, how would you feel about that? 3 (P)     AUA SYMPTOM SCORE 24 (P)            Review of Systems       Objective   /68 (BP Location: Left arm, Patient Position: Sitting, Cuff Size: Large)   Pulse 55   Ht 5' 9\" (1.753 m)   Wt 125 kg (275 lb)   SpO2 97%   BMI 40.61 kg/m²     Physical Exam GEN: no acute distress    RESP: breathing comfortably with no accessory muscle use    ABD: soft, non-tender, non-distended   INCISION:    EXT: no significant peripheral edema   (Male): Penis circumcised, phallus normal, meatus patent.  Testicles descended into scrotum bilaterally " without masses nor tenderness.  No inguinal hernias bilaterally.  PINA: Prostate is not enlarged at 28 grams. The prostate is not boggy. The prostate is not tender.  No nodules noted      RADIOLOGY:   none        Results   Lab Results   Component Value Date    PSA 1.093 07/15/2024    PSA 1.161 07/15/2024    PSA 0.7 05/15/2023     Lab Results   Component Value Date    CALCIUM 9.3 03/11/2025    K 4.8 03/11/2025    CO2 28 03/11/2025     03/11/2025    BUN 19 03/11/2025    CREATININE 1.37 (H) 03/11/2025     Lab Results   Component Value Date    WBC 6.34 03/11/2025    HGB 14.5 03/11/2025    HCT 47.5 03/11/2025    MCV 94 03/11/2025     03/11/2025       Office Urine Dip  No results found for this or any previous visit (from the past hour).

## 2025-06-29 DIAGNOSIS — N40.0 BPH WITHOUT OBSTRUCTION/LOWER URINARY TRACT SYMPTOMS: ICD-10-CM

## 2025-07-01 RX ORDER — TAMSULOSIN HYDROCHLORIDE 0.4 MG/1
0.4 CAPSULE ORAL
Qty: 100 CAPSULE | Refills: 1 | Status: SHIPPED | OUTPATIENT
Start: 2025-07-01

## 2025-07-15 ENCOUNTER — RA CDI HCC (OUTPATIENT)
Dept: OTHER | Facility: HOSPITAL | Age: 65
End: 2025-07-15

## 2025-07-17 ENCOUNTER — APPOINTMENT (OUTPATIENT)
Dept: LAB | Age: 65
End: 2025-07-17
Payer: COMMERCIAL

## 2025-07-17 DIAGNOSIS — R35.0 BENIGN PROSTATIC HYPERPLASIA WITH URINARY FREQUENCY: ICD-10-CM

## 2025-07-17 DIAGNOSIS — N40.1 BENIGN PROSTATIC HYPERPLASIA WITH URINARY FREQUENCY: ICD-10-CM

## 2025-07-17 DIAGNOSIS — E11.9 TYPE 2 DIABETES MELLITUS WITHOUT COMPLICATION, WITHOUT LONG-TERM CURRENT USE OF INSULIN (HCC): ICD-10-CM

## 2025-07-17 DIAGNOSIS — I10 ESSENTIAL HYPERTENSION: ICD-10-CM

## 2025-07-17 DIAGNOSIS — E55.9 VITAMIN D DEFICIENCY: ICD-10-CM

## 2025-07-17 DIAGNOSIS — E78.2 MIXED HYPERLIPIDEMIA: ICD-10-CM

## 2025-07-17 LAB
25(OH)D3 SERPL-MCNC: 29 NG/ML (ref 30–100)
ALBUMIN SERPL BCG-MCNC: 3.9 G/DL (ref 3.5–5)
ALP SERPL-CCNC: 56 U/L (ref 34–104)
ALT SERPL W P-5'-P-CCNC: 14 U/L (ref 7–52)
ANION GAP SERPL CALCULATED.3IONS-SCNC: 7 MMOL/L (ref 4–13)
AST SERPL W P-5'-P-CCNC: 17 U/L (ref 13–39)
BASOPHILS # BLD AUTO: 0.07 THOUSANDS/ÂΜL (ref 0–0.1)
BASOPHILS NFR BLD AUTO: 1 % (ref 0–1)
BILIRUB SERPL-MCNC: 0.68 MG/DL (ref 0.2–1)
BUN SERPL-MCNC: 24 MG/DL (ref 5–25)
CALCIUM SERPL-MCNC: 9.4 MG/DL (ref 8.4–10.2)
CHLORIDE SERPL-SCNC: 104 MMOL/L (ref 96–108)
CHOLEST SERPL-MCNC: 144 MG/DL (ref ?–200)
CO2 SERPL-SCNC: 27 MMOL/L (ref 21–32)
CREAT SERPL-MCNC: 1.23 MG/DL (ref 0.6–1.3)
EOSINOPHIL # BLD AUTO: 0.13 THOUSAND/ÂΜL (ref 0–0.61)
EOSINOPHIL NFR BLD AUTO: 2 % (ref 0–6)
ERYTHROCYTE [DISTWIDTH] IN BLOOD BY AUTOMATED COUNT: 14.1 % (ref 11.6–15.1)
EST. AVERAGE GLUCOSE BLD GHB EST-MCNC: 123 MG/DL
GFR SERPL CREATININE-BSD FRML MDRD: 61 ML/MIN/1.73SQ M
GLUCOSE P FAST SERPL-MCNC: 115 MG/DL (ref 65–99)
HBA1C MFR BLD: 5.9 %
HCT VFR BLD AUTO: 46.1 % (ref 36.5–49.3)
HDLC SERPL-MCNC: 41 MG/DL
HGB BLD-MCNC: 14.5 G/DL (ref 12–17)
IMM GRANULOCYTES # BLD AUTO: 0.02 THOUSAND/UL (ref 0–0.2)
IMM GRANULOCYTES NFR BLD AUTO: 0 % (ref 0–2)
LDLC SERPL CALC-MCNC: 80 MG/DL (ref 0–100)
LDLC SERPL DIRECT ASSAY-MCNC: 93 MG/DL (ref 0–100)
LYMPHOCYTES # BLD AUTO: 2.12 THOUSANDS/ÂΜL (ref 0.6–4.47)
LYMPHOCYTES NFR BLD AUTO: 33 % (ref 14–44)
MCH RBC QN AUTO: 28.8 PG (ref 26.8–34.3)
MCHC RBC AUTO-ENTMCNC: 31.5 G/DL (ref 31.4–37.4)
MCV RBC AUTO: 92 FL (ref 82–98)
MONOCYTES # BLD AUTO: 0.41 THOUSAND/ÂΜL (ref 0.17–1.22)
MONOCYTES NFR BLD AUTO: 6 % (ref 4–12)
NEUTROPHILS # BLD AUTO: 3.77 THOUSANDS/ÂΜL (ref 1.85–7.62)
NEUTS SEG NFR BLD AUTO: 58 % (ref 43–75)
NONHDLC SERPL-MCNC: 103 MG/DL
NRBC BLD AUTO-RTO: 0 /100 WBCS
PLATELET # BLD AUTO: 243 THOUSANDS/UL (ref 149–390)
PMV BLD AUTO: 9.9 FL (ref 8.9–12.7)
POTASSIUM SERPL-SCNC: 4.7 MMOL/L (ref 3.5–5.3)
PROT SERPL-MCNC: 7.8 G/DL (ref 6.4–8.4)
PSA SERPL-MCNC: 1.14 NG/ML (ref 0–4)
RBC # BLD AUTO: 5.03 MILLION/UL (ref 3.88–5.62)
SODIUM SERPL-SCNC: 138 MMOL/L (ref 135–147)
TRIGL SERPL-MCNC: 113 MG/DL (ref ?–150)
TSH SERPL DL<=0.05 MIU/L-ACNC: 0.76 UIU/ML (ref 0.45–4.5)
WBC # BLD AUTO: 6.52 THOUSAND/UL (ref 4.31–10.16)

## 2025-07-17 PROCEDURE — 83721 ASSAY OF BLOOD LIPOPROTEIN: CPT

## 2025-07-17 PROCEDURE — 84443 ASSAY THYROID STIM HORMONE: CPT

## 2025-07-17 PROCEDURE — 80053 COMPREHEN METABOLIC PANEL: CPT

## 2025-07-17 PROCEDURE — 36415 COLL VENOUS BLD VENIPUNCTURE: CPT

## 2025-07-17 PROCEDURE — 82306 VITAMIN D 25 HYDROXY: CPT

## 2025-07-17 PROCEDURE — 84153 ASSAY OF PSA TOTAL: CPT

## 2025-07-17 PROCEDURE — 83036 HEMOGLOBIN GLYCOSYLATED A1C: CPT

## 2025-07-17 PROCEDURE — 85025 COMPLETE CBC W/AUTO DIFF WBC: CPT

## 2025-07-17 PROCEDURE — 80061 LIPID PANEL: CPT

## 2025-07-18 DIAGNOSIS — E11.9 TYPE 2 DIABETES MELLITUS WITHOUT COMPLICATION, WITHOUT LONG-TERM CURRENT USE OF INSULIN (HCC): ICD-10-CM

## 2025-07-18 RX ORDER — ATORVASTATIN CALCIUM 20 MG/1
20 TABLET, FILM COATED ORAL EVERY EVENING
Qty: 90 TABLET | Refills: 1 | Status: SHIPPED | OUTPATIENT
Start: 2025-07-18

## 2025-07-21 ENCOUNTER — OFFICE VISIT (OUTPATIENT)
Dept: FAMILY MEDICINE CLINIC | Facility: CLINIC | Age: 65
End: 2025-07-21
Payer: COMMERCIAL

## 2025-07-21 VITALS
OXYGEN SATURATION: 95 % | SYSTOLIC BLOOD PRESSURE: 110 MMHG | DIASTOLIC BLOOD PRESSURE: 72 MMHG | HEART RATE: 64 BPM | HEIGHT: 69 IN | WEIGHT: 273 LBS | TEMPERATURE: 98.1 F | BODY MASS INDEX: 40.43 KG/M2

## 2025-07-21 DIAGNOSIS — M17.0 PRIMARY OSTEOARTHRITIS OF BOTH KNEES: ICD-10-CM

## 2025-07-21 DIAGNOSIS — E78.2 MIXED HYPERLIPIDEMIA: ICD-10-CM

## 2025-07-21 DIAGNOSIS — J30.2 SEASONAL ALLERGIES: ICD-10-CM

## 2025-07-21 DIAGNOSIS — F32.0 CURRENT MILD EPISODE OF MAJOR DEPRESSIVE DISORDER WITHOUT PRIOR EPISODE (HCC): ICD-10-CM

## 2025-07-21 DIAGNOSIS — R20.2 PARESTHESIAS IN RIGHT HAND: ICD-10-CM

## 2025-07-21 DIAGNOSIS — E11.9 TYPE 2 DIABETES MELLITUS WITHOUT COMPLICATION, WITHOUT LONG-TERM CURRENT USE OF INSULIN (HCC): Primary | ICD-10-CM

## 2025-07-21 DIAGNOSIS — G47.33 OSA (OBSTRUCTIVE SLEEP APNEA): ICD-10-CM

## 2025-07-21 DIAGNOSIS — I10 ESSENTIAL HYPERTENSION: ICD-10-CM

## 2025-07-21 DIAGNOSIS — N40.0 BPH WITHOUT OBSTRUCTION/LOWER URINARY TRACT SYMPTOMS: ICD-10-CM

## 2025-07-21 DIAGNOSIS — K21.9 GASTROESOPHAGEAL REFLUX DISEASE, UNSPECIFIED WHETHER ESOPHAGITIS PRESENT: ICD-10-CM

## 2025-07-21 DIAGNOSIS — Z86.79 HISTORY OF ATRIAL FIBRILLATION: ICD-10-CM

## 2025-07-21 DIAGNOSIS — R29.898 RIGHT HAND WEAKNESS: ICD-10-CM

## 2025-07-21 DIAGNOSIS — E66.813 CLASS 3 SEVERE OBESITY DUE TO EXCESS CALORIES WITH SERIOUS COMORBIDITY AND BODY MASS INDEX (BMI) OF 40.0 TO 44.9 IN ADULT: ICD-10-CM

## 2025-07-21 DIAGNOSIS — G25.81 RLS (RESTLESS LEGS SYNDROME): ICD-10-CM

## 2025-07-21 DIAGNOSIS — E55.9 VITAMIN D DEFICIENCY: ICD-10-CM

## 2025-07-21 DIAGNOSIS — R42 DIZZINESS: ICD-10-CM

## 2025-07-21 PROBLEM — E11.22 TYPE 2 DIABETES MELLITUS WITH CHRONIC KIDNEY DISEASE, WITHOUT LONG-TERM CURRENT USE OF INSULIN, UNSPECIFIED CKD STAGE (HCC): Status: RESOLVED | Noted: 2025-07-21 | Resolved: 2025-07-21

## 2025-07-21 PROBLEM — E11.22 TYPE 2 DIABETES MELLITUS WITH CHRONIC KIDNEY DISEASE, WITHOUT LONG-TERM CURRENT USE OF INSULIN, UNSPECIFIED CKD STAGE (HCC): Status: ACTIVE | Noted: 2025-07-21

## 2025-07-21 PROCEDURE — 99214 OFFICE O/P EST MOD 30 MIN: CPT | Performed by: FAMILY MEDICINE

## 2025-07-21 RX ORDER — ACYCLOVIR 800 MG/1
1 TABLET ORAL
Qty: 6 EACH | Refills: 3 | Status: SHIPPED | OUTPATIENT
Start: 2025-07-21

## 2025-07-21 NOTE — ASSESSMENT & PLAN NOTE
LDL at goal for DM2.  Continue Lipitor 20mg QHS.  Recommend lifestyle modifications.       Orders:    Comprehensive metabolic panel; Future    TSH, 3rd generation with Free T4 reflex; Future    LDL cholesterol, direct; Future

## 2025-07-21 NOTE — ASSESSMENT & PLAN NOTE
Stable.  Check blood pressure outside of office.  Recommend lifestyle modifications.       Orders:    Comprehensive metabolic panel; Future

## 2025-07-21 NOTE — ASSESSMENT & PLAN NOTE
[place] start multivitamin and over-the-counter vitamin D3 1000 - 3000 International Units daily.       Orders:    Vitamin D 25 hydroxy; Future

## 2025-07-21 NOTE — PROGRESS NOTES
Diabetic Foot Exam    Patient's shoes and socks removed.    Right Foot/Ankle   Right Foot Inspection  Skin Exam: skin normal. Skin not intact, no dry skin, no warmth, no callus, no erythema, no maceration, no abnormal color, no pre-ulcer, no ulcer and no callus.     Toe Exam: No swelling, no tenderness, erythema and  no right toe deformity    Sensory   Vibration: intact  Proprioception: intact  Monofilament testing: intact    Vascular  Capillary refills: < 3 seconds  The right DP pulse is 1+. The right PT pulse is 1+.     Left Foot/Ankle  Left Foot Inspection  Skin Exam: skin normal. Skin not intact, no dry skin, no warmth, no erythema, no maceration, normal color, no pre-ulcer, no ulcer and no callus.     Toe Exam: No swelling, no tenderness, no erythema and no left toe deformity.     Sensory   Vibration: intact  Proprioception: intact  Monofilament testing: intact    Vascular  Capillary refills: < 3 seconds  The left DP pulse is 1+. The left PT pulse is 1+.     Assign Risk Category  No deformity present  No loss of protective sensation  No weak pulses  Risk: 0     generalized

## 2025-07-21 NOTE — PATIENT INSTRUCTIONS
To Do:      Call for appt - Cardio Dr. Spencer - Next appt 11/24 - Overdue  Call for appt - Sleep Medicine Dr. Chacko - Next appt 5/24 - Overdue        Call Insurance to Ask About Diabetes Med Coverage -      GLP-1 Agonists - Pill - Rybelsus, etc., Injectable - Ozempic, Mounjaro, Trulicity, Victoza, Byetta, etc.  SGLT-2 Inhibitor - Pill - Jardiance, Farxiga, etc.       Please let us know about your medication selection and availability at pharmacy of your choice.

## 2025-07-21 NOTE — ASSESSMENT & PLAN NOTE
Lab Results   Component Value Date    HGBA1C 5.9 (H) 07/17/2025       Stable.  Continue Metformin.  Restart Rybelsus 3mg QD, then increase 7mg QD - patient to check coverage c insurance.  Patient declines DM education 9/12/23.  Recommend lifestyle modifications.     Goal blood sugars:  Fasting in AM - Less than 100. 2 hours after any meal - Less than 140.       Orders:    Comprehensive metabolic panel; Future    Hemoglobin A1C; Future    Albumin / creatinine urine ratio; Future    Continuous Glucose Sensor (FreeStyle Thai 3 Sensor) MISC; Use 1 each every 14 (fourteen) days

## 2025-07-21 NOTE — PROGRESS NOTES
Assessment/Plan:  Assessment & Plan  Type 2 diabetes mellitus without complication, without long-term current use of insulin ([facility])    Lab Results   Component Value Date    HGBA1C 5.9 (H) 07/17/2025       Stable.  Continue Metformin.  Restart Rybelsus 3mg QD, then increase 7mg QD - patient to check coverage c insurance.  Patient declines DM education 9/12/23.  Recommend lifestyle modifications.     Goal blood sugars:  Fasting in AM - Less than 100. 2 hours after any meal - Less than 140.       Orders:    Comprehensive metabolic panel; Future    Hemoglobin A1C; Future    Albumin / creatinine urine ratio; Future    Continuous Glucose Sensor (FreeStyle Thai 3 Sensor) MISC; Use 1 each every 14 (fourteen) days     Essential hypertension  Stable.  Check blood pressure outside of office.  Recommend lifestyle modifications.       Orders:    Comprehensive metabolic panel; Future     Mixed hyperlipidemia  LDL at goal for DM2.  Continue Lipitor 20mg QHS.  Recommend lifestyle modifications.       Orders:    Comprehensive metabolic panel; Future    TSH, 3rd generation with Free T4 reflex; Future    LDL cholesterol, direct; Future     Right hand weakness  Pending Ortho Hand and OT consults.      Orders:    Ambulatory Referral to Orthopedic Surgery; Future    Ambulatory Referral to Occupational Therapy; Future    Paresthesias in right hand  Pending Ortho Hand and OT consults.      Orders:    Ambulatory Referral to Orthopedic Surgery; Future    Ambulatory Referral to Occupational Therapy; Future    RLS (restless legs syndrome)  Stable on Requip 1 mg nightly.            Vitamin D deficiency  IRecommend start multivitamin and over-the-counter vitamin D3 1000 - 3000 International Units daily.       Orders:    Vitamin D 25 hydroxy; Future     Primary osteoarthritis of both knees  Management per Ortho.       You may use Tylenol (Acetaminophen) up to 3,000mg daily (in 24 hours) as needed for pain or fever.            JANEY (obstructive  sleep apnea)  Patient intolerant of CPAP.  He is not interested in Inspire.            Current mild episode of major depressive disorder without prior episode (HCC)  Depression Screening Follow-up Plan: Patient's depression screening was positive with a PHQ-9 score of 6.   Stable s meds and counseling.          Gastroesophageal reflux disease, unspecified whether esophagitis present  Stable. Continue Pepcid 20mg BID PRN.  Watch GERD diet.  To consider GI referral for EGD if uncontrolled?            Class 3 severe obesity due to excess calories with serious comorbidity and body mass index (BMI) of 40.0 to 44.9 in adult  Stable.   Recommend lifestyle modifications. Patient previously on Ozempic, but D/C due to cost. To consider Weight Management referral PRN?           Seasonal allergies  Stable on Claritin 10 mg daily.          History of atrial fibrillation  Management per Cardio - Overdue for appt.            BPH without obstruction/lower urinary tract symptoms  Management per Uro. Continue Flomax 0.4mg QHS.           Dizziness  Etiology?  Patient advised to check BP and BS when symptoms, as well as F/U c Cardio due to H/O of Afib.               Return in about 4 months (around 11/21/2025) for 30min - 4mo - DM2, HTN, HL, Dep, CKD, GERD, Afib, RLS, Labs..      Future Appointments   Date Time Provider Department Center   11/21/2025 10:30 AM Britney Monson DO  [facility]   6/24/2026  9:30 AM Manjinder Meehan PA-C [facility]        Subjective:     Tobi is a 64 y.o. male who presents today for a follow-up on his chronic medical conditions.        HPI:  Chief Complaint   Patient presents with    Follow-up     -- Above per clinical staff and reviewed. --      Diabetes  He presents for his follow-up diabetic visit. He has type 2 diabetes mellitus. Hypoglycemia symptoms include dizziness (Occurs once weekly when active, lasting for 1 minute). Pertinent negatives for diabetes include no chest  pain and no fatigue. Risk factors for coronary artery disease include diabetes mellitus, hypertension, male sex, obesity and dyslipidemia. Current diabetic treatment includes diet. His weight is stable. He is following a generally healthy diet. He participates in exercise daily. (Does not check sugar at home.) An ACE inhibitor/angiotensin II receptor blocker is being taken. He does not see a podiatrist.Eye exam is not current.         Today:      Return in about 4 months (around 7/19/2025) for 30min - 4mo - DM2, HTN, HL, Dep, CKD, GERD, Afib, RLS, Labs.     4mo OV     Right Hand 5th Finger Numbness - Symptoms x 1 year, unchanged, constant.  Has difficulty with hand  in cold weather.          Morbid Obesity - Watching diet.  +Regular exercise - (limited due to knee pain) - Walking for 30 minutes, 5 days per week, Swimming in outdoor pool for 15 minutes, 2 times per week.  Does yard work.  Previously on Ozempic, but stopped after a couple months in 2021 as Rx was too expensive previously.       DM2 - On Metformin 850mg 1 pill BID (no higher dose previously).  He never started Rybelsus 7mg QD due to price, but he needs to review with insurance.  No Optho.  No Podiatry.  Declines DM Education 9/12/23.  No low BS.  Not using Freestyle Thai, but he does not know why.       HTN - On Metoprolol tartrate 50mg BID, Lisinopril 20mg QD.  No BP check outside of office.  Has wrist cuff at home.       Hyperlipidemia -  On Lipitor 20mg QHS.  D/C Zocor 40mg QHS due to inefficacy.  No higher dose or other statins previously.       CKD Stage 3 - No Renal consult previously.       Afib /Fam Hx Early CAD - Management per Cardio Dr. Spencer - Next appt 11/24 - Overdue.  On Metoprolol 50mg BID. Previously on Eliquis, which was D/C s/p ablation, failed cardioversion previously.         JANEY - Management per Sleep Medicine Dr. Chacko - Next appt 5/24 - Overdue.  Pending Sleep Study 10/24 - Overdue.  He is CPAP intolerant despite trying  different masks.       GERD - On Pepcid 20mg BID PRN - using BID, and D/C Omeprazole 20mg QD (after taking for years).  No GI consult or EGD previously.  No other GERD meds.  Watching GERD diet.     Depression - Stable s meds.  Previously on unknown med D/C after 1-2 days due to weird nightmares.  No counseling previously.  Poor social supports.  No SI/HI/AH/VH.         PHQ-2/9 Depression Screening    Little interest or pleasure in doing things: 0 - not at all  Feeling down, depressed, or hopeless: 0 - not at all  Trouble falling or staying asleep, or sleeping too much: 0 - not at all  Feeling tired or having little energy: 0 - not at all  Poor appetite or overeatin - not at all  Feeling bad about yourself - or that you are a failure or have let yourself or your family down: 0 - not at all  Trouble concentrating on things, such as reading the newspaper or watching television: 0 - not at all  Moving or speaking so slowly that other people could have noticed. Or the opposite - being so fidgety or restless that you have been moving around a lot more than usual: 0 - not at all  Thoughts that you would be better off dead, or of hurting yourself in some way: 0 - not at all  PHQ-9 Score: 0  PHQ-9 Interpretation: No or Minimal depression       JAKE-7 Flowsheet Screening      Flowsheet Row Most Recent Value   Over the last two weeks, how often have you been bothered by the following problems?     Feeling nervous, anxious, or on edge 0   Not being able to stop or control worrying 0   Worrying too much about different things 0   Trouble relaxing  0   Being so restless that it's hard to sit still 0   Becoming easily annoyed or irritable  0   Feeling afraid as if something awful might happen 0   How difficult have these problems made it for you to do your work, take care of things at home, or get along with other people?  Not difficult at all   JAKE Score  0                RLS - Stable on Requp 1mg QHS.  No higher dose or  other meds previously.         OA B/L Knees - Management per Ortho Dr. Johnson.  Next appt PRN.  Declined PT.       AR - On Claritin 10mg QD.       Nocturia - Management per Loki Malhotra Next appt 6/26.  On Flomax 0.4mg QHS.  Nocturia x 2.  Has urinary hesitancy.         Vitamin D Deficiency - s/p Drisdol.  Taking multivitamin and Vitamin D3 3,000IU daily - only taking 2 times per week.       Reviewed:  Labs 7/17/25, Ortho 11/10/23, Uro 6/24/25, Cardio 11/27/23, Sleep Med 4/4/24     Sees Loki Malhotra PA-C - Next appt 6/25.         The following portions of the patient's history were reviewed and updated as appropriate: allergies, current medications, past family history, past medical history, past social history, past surgical history and problem list.      Review of Systems   Constitutional:  Negative for appetite change, chills, diaphoresis, fatigue and fever.   Respiratory:  Negative for chest tightness and shortness of breath.    Cardiovascular:  Negative for chest pain.   Gastrointestinal:  Positive for diarrhea. Negative for abdominal pain, blood in stool, nausea and vomiting.   Genitourinary:  Negative for dysuria.   Neurological:  Positive for dizziness (Occurs once weekly when active, lasting for 1 minute).        Current Outpatient Medications   Medication Sig Dispense Refill    Accu-Chek Guide test strip       Accu-Chek Softclix Lancets lancets       atorvastatin (LIPITOR) 20 mg tablet TAKE 1 TABLET BY MOUTH EVERY DAY IN THE EVENING 90 tablet 1    Blood Glucose Monitoring Suppl (Accu-Chek Guide Me) w/Device KIT TEST ONCE DAILY AS DIRECTED 100 kit 0    Continuous Glucose Sensor (FreeStyle Thai 3 Sensor) [facility] Use 1 each every 14 (fourteen) days 6 each 3    famotidine (PEPCID) 20 mg tablet TAKE 1 TABLET BY MOUTH TWICE A  tablet 1    lisinopril (ZESTRIL) 20 mg tablet Take 1 tablet (20 mg total) by mouth daily 90 tablet 2    loratadine (CLARITIN) 10 mg tablet Take 1 tablet (10  "mg total) by mouth daily as needed for allergies 90 tablet 2    metFORMIN (GLUCOPHAGE) 850 mg tablet Take 1 tablet (850 mg total) by mouth 2 (two) times a day with meals 180 tablet 2    metoprolol tartrate (LOPRESSOR) 50 mg tablet Take 1 tablet (50 mg total) by mouth 2 (two) times a day 180 tablet 2    Multiple Vitamin (multivitamin) capsule Take 1 capsule by mouth in the morning.      rOPINIRole (REQUIP) 1 mg tablet Take 1 tablet (1 mg total) by mouth daily at bedtime 90 tablet 2    tamsulosin (FLOMAX) 0.4 mg TAKE 1 CAPSULE BY MOUTH EVERY DAY WITH DINNER (Patient taking differently: Take 0.4 mg by mouth daily with dinner Rx per Uro) 100 capsule 1    cholecalciferol (VITAMIN D3) 400 units tablet Take 1,000 Units by mouth daily (Patient not taking: Reported on 7/21/2025)      Continuous Blood Gluc  (FreeStyle Thai 3 [place]) ADELIA USE 1 EACH 1 (ONE) TIME FOR 1 DOSE (Patient not taking: Reported on 11/18/2024)       No current facility-administered medications for this visit.       Objective:  /72 (BP Location: Left arm, Patient Position: Sitting, Cuff Size: Large)   Pulse 64   Temp 98.1 °F (36.7 °C)   Ht 5' 9\" (1.753 m)   Wt 124 kg (273 lb)   SpO2 95%   BMI 40.32 kg/m²    Wt Readings from Last 3 Encounters:   07/21/25 124 kg (273 lb)   06/24/25 125 kg (275 lb)   03/19/25 123 kg (271 lb)      BP Readings from Last 3 Encounters:   07/21/25 110/72   06/24/25 112/68   03/19/25 106/70          Physical Exam  Vitals and nursing note reviewed.   Constitutional:       General: He is not in acute distress.     Appearance: Normal appearance. He is well-developed. He is obese. He is not ill-appearing or toxic-appearing.   HENT:      Head: Normocephalic and atraumatic.     Eyes:      Conjunctiva/sclera: Conjunctivae normal.     Neck:      Thyroid: No thyromegaly.      Vascular: No carotid bruit.     Cardiovascular:      Rate and Rhythm: Normal rate and regular rhythm.      Pulses: Normal pulses.      Heart " "sounds: Normal heart sounds.   Pulmonary:      Effort: Pulmonary effort is normal.      Breath sounds: Normal breath sounds.   Abdominal:      General: Bowel sounds are normal. There is no distension.      Palpations: Abdomen is soft. There is no mass.      Tenderness: There is no abdominal tenderness. There is no guarding or rebound.     Musculoskeletal:         General: No swelling or tenderness.      Right hand: Deformity (5th finger flexed) present. Normal range of motion. Normal strength. Normal capillary refill. Normal pulse.      Left hand: Normal. Normal range of motion. Normal strength. Normal capillary refill. Normal pulse.      Cervical back: Neck supple.      Right lower leg: No edema.      Left lower leg: No edema.      Comments: Negative B/L Prayer, Reverse Prayer, Tinel's, Finklestein tests.   Lymphadenopathy:      Cervical: No cervical adenopathy.     Neurological:      General: No focal deficit present.      Mental Status: He is alert and oriented to person, place, and time.     Psychiatric:         Mood and Affect: Mood normal.         Behavior: Behavior normal.         Thought Content: Thought content normal.         Judgment: Judgment normal.         Lab Results:      Lab Results   Component Value Date    WBC 6.52 07/17/2025    HGB 14.5 07/17/2025    HCT 46.1 07/17/2025     07/17/2025    TRIG 113 07/17/2025    HDL 41 07/17/2025    LDLDIRECT 93 07/17/2025    ALT 14 07/17/2025    AST 17 07/17/2025    K 4.7 07/17/2025     07/17/2025    CREATININE 1.23 07/17/2025    BUN 24 07/17/2025    CO2 27 07/17/2025    PSA 1.141 07/17/2025    GLUF 115 (H) 07/17/2025    HGBA1C 5.9 (H) 07/17/2025     No results found for: \"URICACID\"  Invalid input(s): \"BASENAME\" Vitamin D    No results found.     POCT Labs        Depression Screening and Follow-up Plan: Patient was screened for depression during today's encounter. They screened negative with a PHQ-9 score of 0.                        "

## 2025-07-22 ENCOUNTER — TELEPHONE (OUTPATIENT)
Age: 65
End: 2025-07-22

## 2025-07-22 NOTE — TELEPHONE ENCOUNTER
PA for FREESTYLE BRITNEY 3 SENSOR SUBMITTED to [employer]    via      [x][employer]-Case ID #     [x]PA sent as URGENT    All office notes, labs and other pertaining documents and studies sent. Clinical questions answered. Awaiting determination from insurance company.     Turnaround time for your insurance to make a decision on your Prior Authorization can take 7-21 business days.

## 2025-07-23 NOTE — TELEPHONE ENCOUNTER
PA for freestyle plio 3 sensor  DENIED    Reason:(Screenshot if applicable)        Message sent to office clinical pool Yes    Denial letter scanned into Media Yes    We can gladly do an appeal but the process can take about 30-60 days to provide determination. Please have the office staff schedule a Peer to Peer at phone [phone] . If an appeal is truly warranted please have Provider send clinical documentation to the PA department to support the appeal.     **Please follow up with your patient regarding denial and next steps**

## 2025-07-24 NOTE — TELEPHONE ENCOUNTER
Please notify patient of same.  He should utilize a normal glucometer instead.  Please ask if he needs new Rx for glucometer, test strips, and lancets.

## 2025-07-28 NOTE — TELEPHONE ENCOUNTER
Patient does need a new Rx for his diabetic test equipment. Said he needs glucometer, strips and lancets. Please order.

## 2025-07-31 DIAGNOSIS — E11.9 TYPE 2 DIABETES MELLITUS WITHOUT COMPLICATION, WITHOUT LONG-TERM CURRENT USE OF INSULIN (HCC): Primary | ICD-10-CM

## 2025-07-31 RX ORDER — LANCETS
EACH MISCELLANEOUS
Qty: 100 EACH | Refills: 3 | Status: CANCELLED | OUTPATIENT
Start: 2025-07-31

## 2025-07-31 RX ORDER — BLOOD SUGAR DIAGNOSTIC
STRIP MISCELLANEOUS
Qty: 100 EACH | Refills: 3 | Status: SHIPPED | OUTPATIENT
Start: 2025-07-31

## 2025-07-31 RX ORDER — LANCETS 33 GAUGE
EACH MISCELLANEOUS
Qty: 100 EACH | Refills: 3 | Status: SHIPPED | OUTPATIENT
Start: 2025-07-31

## 2025-07-31 RX ORDER — BLOOD-GLUCOSE METER
KIT MISCELLANEOUS
Qty: 1 KIT | Refills: 0 | Status: SHIPPED | OUTPATIENT
Start: 2025-07-31

## (undated) DEVICE — ACE WRAP 6 IN STERILE

## (undated) DEVICE — NEEDLE 22 G X 1 1/2 SAFETY

## (undated) DEVICE — BLADE SHAVER DISSECTOR 3.5MM 13CM COOLCUT

## (undated) DEVICE — IMPERVIOUS STOCKINETTE: Brand: DEROYAL

## (undated) DEVICE — ACE WRAP 6 IN UNSTERILE

## (undated) DEVICE — GAUZE SPONGES,16 PLY: Brand: CURITY

## (undated) DEVICE — SUT MONOCRYL 3-0 PS-2 18 IN Y497G

## (undated) DEVICE — SYRINGE 20ML LL

## (undated) DEVICE — SYRINGE 10ML LL

## (undated) DEVICE — CHLORAPREP HI-LITE 26ML ORANGE

## (undated) DEVICE — OCCLUSIVE GAUZE STRIP,3% BISMUTH TRIBROMOPHENATE IN PETROLATUM BLEND: Brand: XEROFORM

## (undated) DEVICE — GLOVE SRG BIOGEL 8.5

## (undated) DEVICE — INTENDED FOR TISSUE SEPARATION, AND OTHER PROCEDURES THAT REQUIRE A SHARP SURGICAL BLADE TO PUNCTURE OR CUT.: Brand: BARD-PARKER ® CARBON RIB-BACK BLADES

## (undated) DEVICE — ACE WRAP 4 IN STERILE

## (undated) DEVICE — TUBING ARTHROSCOPY REDUCE PATIENT

## (undated) DEVICE — SUT ETHILON 3-0 PS-1 18 IN 1663G

## (undated) DEVICE — TUBING ARTHROSCOPY REDUCE PUMP

## (undated) DEVICE — BETHLEHEM UNIVERSAL  ARTHRO PK: Brand: CARDINAL HEALTH

## (undated) DEVICE — GLOVE INDICATOR PI UNDERGLOVE SZ 8 BLUE

## (undated) DEVICE — PADDING CAST 4 IN  COTTON STRL

## (undated) DEVICE — GLOVE SRG BIOGEL ECLIPSE 8

## (undated) DEVICE — ADHESIVE SKIN HIGH VISCOSITY EXOFIN 1ML

## (undated) DEVICE — VIAL DECANTER

## (undated) DEVICE — ABDOMINAL PAD: Brand: DERMACEA

## (undated) DEVICE — DECANTER: Brand: UNBRANDED

## (undated) DEVICE — GLOVE SRG BIOGEL 7.5

## (undated) DEVICE — GLOVE INDICATOR PI UNDERGLOVE SZ 8.5 BLUE

## (undated) DEVICE — LIGHT GLOVE GREEN

## (undated) DEVICE — BLADE SHAVER TORPEDO 4MM 13CM  COOLCUT